# Patient Record
Sex: FEMALE | Race: WHITE | NOT HISPANIC OR LATINO | Employment: STUDENT | URBAN - METROPOLITAN AREA
[De-identification: names, ages, dates, MRNs, and addresses within clinical notes are randomized per-mention and may not be internally consistent; named-entity substitution may affect disease eponyms.]

---

## 2017-01-19 ENCOUNTER — OFFICE VISIT (OUTPATIENT)
Dept: PSYCHIATRY | Facility: CLINIC | Age: 37
End: 2017-01-19
Attending: PSYCHIATRY & NEUROLOGY
Payer: COMMERCIAL

## 2017-01-19 VITALS
WEIGHT: 152.8 LBS | DIASTOLIC BLOOD PRESSURE: 81 MMHG | BODY MASS INDEX: 25.03 KG/M2 | HEART RATE: 64 BPM | SYSTOLIC BLOOD PRESSURE: 114 MMHG

## 2017-01-19 DIAGNOSIS — F31.89 OTHER BIPOLAR DISORDER (H): ICD-10-CM

## 2017-01-19 DIAGNOSIS — F43.10 PTSD (POST-TRAUMATIC STRESS DISORDER): Primary | ICD-10-CM

## 2017-01-19 PROCEDURE — 99212 OFFICE O/P EST SF 10 MIN: CPT | Mod: ZF

## 2017-01-19 RX ORDER — GABAPENTIN 300 MG/1
300 CAPSULE ORAL 4 TIMES DAILY PRN
Qty: 125 CAPSULE | Refills: 3 | Status: SHIPPED | OUTPATIENT
Start: 2017-01-19 | End: 2017-04-27

## 2017-01-19 NOTE — MR AVS SNAPSHOT
After Visit Summary   1/19/2017    Yara Edouard    MRN: [id number]           Patient Information     Date Of Birth          1980        Visit Information        Provider Department      1/19/2017 1:45 PM Satya Mendes MD [facility]        Today's Diagnoses     PTSD (post-traumatic stress disorder)    -  1    Other bipolar disorder (H)          Care Instructions    Try the gabapentin for a week and if anxiety is stabilized and you are still often nauseous, call about cutting back on Lamictal        Follow-ups after your visit        Follow-up notes from your care team     Return in about 2 months (around 3/19/2017).      Your next 10 appointments already scheduled     Mar 23, 2017  2:15 PM CDT   Schizophrenia Follow Up with Satya Mendes MD   Psychiatry Clinic ([facility])    [place] [id number] [facility] [address] [phone]              Who to contact     Please call your clinic at [phone] to:    Ask questions about your health    Make or cancel appointments    Discuss your medicines    Learn about your test results    Speak to your doctor   If you have compliments or concerns about an experience at your clinic, or if you wish to file a complaint, please contact [facility] Patient Relations at [phone] or email us at [email or web address]         Additional Information About Your Visit        MyChart Information     Your[facility] gives you secure access to your electronic health record. If you see a primary care provider, you can also send messages to your care team and make appointments. If you have questions, please call your primary care clinic.  If you do not have a primary care provider, please call [phone] and they will assist you.      [facility] is an electronic gateway that provides easy, online access to your medical records. With [facility], you can request a clinic  appointment, read your test results, renew a prescription or communicate with your care team.     To access your existing account, please contact your [facility] or call [phone] for assistance.        Care EveryWhere ID     This is your Care EveryWhere ID. This could be used by other organizations to access your [facility] medical records  [id number]        Your Vitals Were     Pulse BMI (Body Mass Index)                64 25.04 kg/m2           Blood Pressure from Last 3 Encounters:   01/19/17 114/81   12/12/16 115/79   12/08/16 123/82    Weight from Last 3 Encounters:   01/19/17 69.3 kg (152 lb 12.8 oz)   12/12/16 73.9 kg (163 lb)   12/08/16 73.1 kg (161 lb 3.2 oz)              Today, you had the following     No orders found for display         Today's Medication Changes          These changes are accurate as of: 1/19/17 11:59 PM.  If you have any questions, ask your nurse or doctor.               These medicines have changed or have updated prescriptions.        Dose/Directions    gabapentin 300 MG capsule   Commonly known as:  NEURONTIN   This may have changed:  when to take this   Used for:  PTSD (post-traumatic stress disorder)   Changed by:  Satya Mendes MD        Dose:  300 mg   Take 1 capsule (300 mg) by mouth 4 times daily as needed   Quantity:  125 capsule   Refills:  3            Where to get your medicines      These medications were sent to [facility] [place], [place] - [address]  [address], [facility] [address]     Phone:  [phone]     gabapentin 300 MG capsule                Primary Care Provider Office Phone # Fax #    Neyda Ori Meza [phone]       [facility] [address]  [facility] [address]        Thank you!     Thank you for choosing PSYCHIATRY CLINIC  for your care. Our goal is always to provide you with excellent care. Hearing back from our patients is one way we  can continue to improve our services. Please take a few minutes to complete the written survey that you may receive in the mail after your visit with us. Thank you!             Your Updated Medication List - Protect others around you: Learn how to safely use, store and throw away your medicines at www.disposemymeds.org.          This list is accurate as of: 1/19/17 11:59 PM.  Always use your most recent med list.                   Brand Name Dispense Instructions for use    FLUoxetine 10 MG capsule    PROzac    90 capsule    Take 3 capsules (30 mg) by mouth daily Take 20 mg every other day alternating with 40 mg       gabapentin 300 MG capsule    NEURONTIN    125 capsule    Take 1 capsule (300 mg) by mouth 4 times daily as needed       hydrOXYzine 50 MG capsule    VISTARIL    120 capsule    Take 1 capsule (50 mg) by mouth 2 times daily as needed       ibuprofen 200 MG tablet    ADVIL/MOTRIN     Take 800 mg by mouth every 6 hours as needed       * lamoTRIgine 100 MG tablet    LAMICTAL    30 tablet    Take 0.5 tablets (50 mg) by mouth daily along with 150 mg tab twice daily (350 mg.day). After a week, increase to 1 tab daily (total 400 mg)       * lamoTRIgine 200 MG tablet    LAMICTAL    60 tablet    Take 1 tablet (200 mg) by mouth 2 times daily       ondansetron 4 MG ODT tab    ZOFRAN-ODT    30 tablet    Take 1 tablet (4 mg) by mouth every 4 hours as needed for nausea       phenazopyridine 200 MG tablet    PYRIDIUM    6 tablet    Take 1 tablet (200 mg) by mouth 3 times daily as needed for pain       prazosin 1 MG capsule    MINIPRESS    125 capsule    Take 4 capsules (4 mg) by mouth At Bedtime       propranolol 40 MG tablet    INDERAL    90 tablet    Take 1 tablet (40 mg) by mouth 4 times daily as needed for tremor       QUEtiapine 100 MG tablet    SEROquel    30 tablet    Take 0.5-1 tablets ( mg) by mouth nightly as needed       topiramate 100 MG tablet    TOPAMAX    60 tablet    Take 2 tablets (200 mg) by  mouth daily       * Notice:  This list has 2 medication(s) that are the same as other medications prescribed for you. Read the directions carefully, and ask your doctor or other care provider to review them with you.
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
## 2017-01-19 NOTE — NURSING NOTE
Chief Complaint   Patient presents with     Recheck Medication     Chemical dependency     Reviewed allergies, smoking status, and pharmacy preference  Administered abuse screening questions   Obtained weight, blood pressure and heart rate

## 2017-02-10 ENCOUNTER — TELEPHONE (OUTPATIENT)
Dept: PSYCHIATRY | Facility: CLINIC | Age: 37
End: 2017-02-10

## 2017-02-10 ENCOUNTER — TELEPHONE (OUTPATIENT)
Dept: PHARMACY | Facility: CLINIC | Age: 37
End: 2017-02-10

## 2017-02-10 NOTE — TELEPHONE ENCOUNTER
Prior Authorization Retail Medication Request  Medication/Dose: Prazosin 1mg   Diagnosis and ICD code: F43.10  New/Renewal/Insurance Change PA: Insurance Change   Previously Tried and Failed Therapies: Zoloft (sertraline), Paxil (paroxetine), Celexa (citalopram), Prozac (fluoxetine) -currently taking), Cymbalta (duloxetine), Effexor (venlafaxine), Pamelor (nortriptyline), Elavil (amitriptyline), Remeron (trazodone), Abilify (aripiprazole), Depakote, Lithium, Lamictal (lamotrigine) - currently taking, Neurontin (gabapentin) -currently taking, Seroquel (quetiapine) - currently taking, Topamax (topiramate) - currently taking, hydroxyzine - currently taking, Inderal (propranolol) - currently taking, Ativan (lorazepam), Klonopin (clonazepam), Valium (diazepam), ECT treatments.      Any additional info from fax request:  Pt has tried numerous medications which failed either due to intolerable adverse effects or they were ineffective in treating symptoms.  Patient has been on this medication since November 2014.  Medication was started for the treatment of sleep disturbance (nightmares) related to PTSD.  If pt is not able to obtain adequate restful sleep this can have a negative effect on his mood including a worsening of anxiety, depressive symptoms and suicidal ideation.   Pt is currently stable on his medication regimen and a change would risk decompensation and possible hospitalization.     Insurance ID (if provided): [id number]  Insurance Phone (if provided): [phone]    Thanks!    Gissell Castro [facility]  Pharmacy Technician II  [facility]  [phone]

## 2017-02-10 NOTE — TELEPHONE ENCOUNTER
Satya Mendes MD     Sent: Fri February 10, 2017  4:01 PM       To: Beverly Smith RN; Loretta Rosales [facility]              Message        ----- Message from Satya Mendes MD sent at 2/10/2017  4:01 PM CST -----       Yes, OK to fill gabapentin early

## 2017-02-10 NOTE — TELEPHONE ENCOUNTER
Pt called to request refills and reported that she is leaving to go out of town on Tuesday, 2/14/17.  Per  policy of filling certain controlled substances and gabapentin, authorization from RN or MD is required if filling more than 2 days before patient should be out of medication.    2/14/17 will be day 26 out of a 30 day fill- is this ok to fill on 2/14?  Routed to HUMBERTO Bryant.    Thank you,  Loretta Rosales, PharmD  [facility]  Phone: [phone]

## 2017-02-14 NOTE — TELEPHONE ENCOUNTER
[facility] Prior Authorization Team   Phone: [phone]  Fax: [phone]    PA Initiation    Medication: Prazosin 1mg   Insurance Company: [employer] - Phone [phone] Fax [phone]  Pharmacy Filling the Rx: [place], [place] - [address]  Filling Pharmacy Phone: [phone]  Filling Pharmacy Fax:    Start Date: 2/14/2017

## 2017-02-20 NOTE — TELEPHONE ENCOUNTER
Prior Authorization Approval    Authorization Effective Date: 2/20/2017  Authorization Expiration Date: 2/20/2018  Medication: Prazosin 1mg - APPROVED  Approved Dose/Quantity:   Reference #: [id number]   Insurance Company: [employer] - Phone [phone] Fax [phone]  Expected CoPay: $0.00     CoPay Card Available:      Foundation Assistance Needed:    Which Pharmacy is filling the prescription (Not needed for infusion/clinic administered): [facility] [place], [place] - [address]  Pharmacy Notified: Yes  Patient Notified: Yes

## 2017-02-21 ENCOUNTER — TELEPHONE (OUTPATIENT)
Dept: PSYCHIATRY | Facility: CLINIC | Age: 37
End: 2017-02-21

## 2017-02-21 NOTE — TELEPHONE ENCOUNTER
Yara was on Tramadol last September for a knee problem and that corresponded with a worsening of depression and concern that she might get addicted. When she tapered off it, her depression improved. Please call her and express my concern that she is back on Tramadol, remind her of her past experience and tell her I advise getting off it as soon as possible. The interaction with fluoxetine could cause a problem either my inhibiting the metabolism of Tramadol thus elevating the dose and increasing the risk of addiction, or lowering the Tramadol effect necessitating higher doses to achieve any benefit.

## 2017-02-21 NOTE — TELEPHONE ENCOUNTER
-Rec'd fax from [facility] regarding potential interaction b/w Prozac and Tramadol  -Per [place]  pt rec'd refill of Tramadol 50 mg tab #40 from Ra Parks MD on 2/14/17  -Will alert Dr. Mendes  -Form placed in provider folder for review.

## 2017-02-28 NOTE — TELEPHONE ENCOUNTER
-Tried reaching pt again  -No answer at home number and vm left requesting c/b to review potential med interaction.  -Clinic number provided for c/b    -Provider Response Form faxed to [email or web address] at [phone]  -Original placed in scanning.

## 2017-03-01 NOTE — TELEPHONE ENCOUNTER
Message  Received: Today       Lizbeth More Katherine J, RN       Phone Number: [phone]                     Yara, returning your call.     Thanks!

## 2017-03-30 ENCOUNTER — OFFICE VISIT (OUTPATIENT)
Dept: PSYCHIATRY | Facility: CLINIC | Age: 37
End: 2017-03-30
Attending: PSYCHIATRY & NEUROLOGY
Payer: COMMERCIAL

## 2017-03-30 VITALS
DIASTOLIC BLOOD PRESSURE: 80 MMHG | HEART RATE: 79 BPM | BODY MASS INDEX: 26.09 KG/M2 | WEIGHT: 159.2 LBS | SYSTOLIC BLOOD PRESSURE: 117 MMHG

## 2017-03-30 DIAGNOSIS — F43.10 PTSD (POST-TRAUMATIC STRESS DISORDER): ICD-10-CM

## 2017-03-30 DIAGNOSIS — F31.89 OTHER BIPOLAR DISORDER (H): Primary | ICD-10-CM

## 2017-03-30 DIAGNOSIS — F60.3 BORDERLINE PERSONALITY DISORDER (H): ICD-10-CM

## 2017-03-30 DIAGNOSIS — F31.32 BIPOLAR AFFECTIVE DISORDER, CURRENTLY DEPRESSED, MODERATE (H): ICD-10-CM

## 2017-03-30 PROCEDURE — 99212 OFFICE O/P EST SF 10 MIN: CPT | Mod: ZF

## 2017-03-30 RX ORDER — SUCRALFATE 1 G/1
1 TABLET ORAL 2 TIMES DAILY
COMMUNITY
End: 2019-06-12

## 2017-03-30 RX ORDER — LAMOTRIGINE 200 MG/1
200 TABLET ORAL AT BEDTIME
Qty: 30 TABLET | Refills: 1 | Status: SHIPPED | OUTPATIENT
Start: 2017-03-30 | End: 2017-04-27

## 2017-03-30 RX ORDER — MIRTAZAPINE 15 MG/1
15 TABLET, FILM COATED ORAL AT BEDTIME
Qty: 30 TABLET | Refills: 1 | Status: SHIPPED | OUTPATIENT
Start: 2017-03-30 | End: 2017-04-27

## 2017-03-30 RX ORDER — LAMOTRIGINE 150 MG/1
150 TABLET ORAL DAILY
Qty: 30 TABLET | Refills: 1 | Status: SHIPPED | OUTPATIENT
Start: 2017-03-30 | End: 2017-04-27

## 2017-03-30 RX ORDER — TOPIRAMATE 100 MG/1
150 TABLET, FILM COATED ORAL AT BEDTIME
Qty: 45 TABLET | Refills: 3 | Status: SHIPPED | OUTPATIENT
Start: 2017-03-30 | End: 2017-04-27

## 2017-03-30 RX ORDER — PRAZOSIN HYDROCHLORIDE 1 MG/1
3 CAPSULE ORAL AT BEDTIME
Qty: 125 CAPSULE | Refills: 3 | Status: SHIPPED | OUTPATIENT
Start: 2017-03-30 | End: 2017-04-27
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
## 2017-03-30 NOTE — PATIENT INSTRUCTIONS
Follow dosage instructions (Hydroxyzine up to 2 x 50 mg)  Go back to attending regular meetings  Start Remeron at bedtime  Call the clinic in a week to report on effects of cutting back on Topamax (now at 150 mg at bedtime)

## 2017-03-30 NOTE — MR AVS SNAPSHOT
After Visit Summary   3/30/2017    Yara Edouard    MRN: [id number]           Patient Information     Date Of Birth          1980        Visit Information        Provider Department      3/30/2017 2:15 PM Satya Mendes MD [facility]        Today's Diagnoses     Other bipolar disorder (H)    -  1    PTSD (post-traumatic stress disorder)        Borderline personality disorder        Bipolar affective disorder, currently depressed, moderate (H)          Care Instructions    Follow dosage instructions (Hydroxyzine up to 2 x 50 mg)  Go back to attending regular meetings  Start Remeron at bedtime  Call the clinic in a week to report on effects of cutting back on Topamax (now at 150 mg at bedtime)        Follow-ups after your visit        Follow-up notes from your care team     Return in about 4 weeks (around 4/27/2017).      Your next 10 appointments already scheduled     Apr 27, 2017  3:15 PM CDT   Schizophrenia Follow Up with Satya Mendes MD   Psychiatry Clinic ([facility])    [address] [facility] [address] [phone]              Who to contact     Please call your clinic at [phone] to:    Ask questions about your health    Make or cancel appointments    Discuss your medicines    Learn about your test results    Speak to your doctor   If you have compliments or concerns about an experience at your clinic, or if you wish to file a complaint, please contact [facility] Patient Relations at [phone] or email us at [email or web address]         Additional Information About Your Visit        MyChart Information     [email or web address] gives you secure access to your electronic health record. If you see a primary care provider, you can also send messages to your care team and make appointments. If you have questions, please call your primary care clinic.  If you do not have a primary care  provider, please call [phone] and they will assist you.      [employer] is an electronic gateway that provides easy, online access to your medical records. With [employer], you can request a clinic appointment, read your test results, renew a prescription or communicate with your care team.     To access your existing account, please contact your [facility] or call [phone] for assistance.        Care EveryWhere ID     This is your Care EveryWhere ID. This could be used by other organizations to access your [facility] medical records  [id number]        Your Vitals Were     Pulse BMI (Body Mass Index)                79 26.09 kg/m2           Blood Pressure from Last 3 Encounters:   03/30/17 117/80   01/19/17 114/81   12/12/16 115/79    Weight from Last 3 Encounters:   03/30/17 72.2 kg (159 lb 3.2 oz)   01/19/17 69.3 kg (152 lb 12.8 oz)   12/12/16 73.9 kg (163 lb)              Today, you had the following     No orders found for display         Today's Medication Changes          These changes are accurate as of: 3/30/17  3:05 PM.  If you have any questions, ask your nurse or doctor.               Start taking these medicines.        Dose/Directions    mirtazapine 15 MG tablet   Commonly known as:  REMERON   Used for:  Bipolar affective disorder, currently depressed, moderate (H)   Started by:  Satya Mendes MD        Dose:  15 mg   Take 1 tablet (15 mg) by mouth At Bedtime   Quantity:  30 tablet   Refills:  1         These medicines have changed or have updated prescriptions.        Dose/Directions    * lamoTRIgine 150 MG tablet   Commonly known as:  LAMICTAL   This may have changed:    - medication strength  - how much to take  - additional instructions   Used for:  Other bipolar disorder (H)   Changed by:  Satya Mendes MD        Dose:  150 mg   Take 1 tablet (150 mg) by mouth daily along with 200 mg tab at night   Quantity:  30 tablet   Refills:  1       * lamoTRIgine 200  MG tablet   Commonly known as:  LaMICtal   This may have changed:    - when to take this  - additional instructions   Used for:  PTSD (post-traumatic stress disorder)   Changed by:  Satya Mendes MD        Dose:  200 mg   Take 1 tablet (200 mg) by mouth At Bedtime with 150 mg every morning   Quantity:  30 tablet   Refills:  1       prazosin 1 MG capsule   Commonly known as:  MINIPRESS   This may have changed:  how much to take   Used for:  PTSD (post-traumatic stress disorder)   Changed by:  Satya Mendes MD        Dose:  3 mg   Take 3 capsules (3 mg) by mouth At Bedtime   Quantity:  125 capsule   Refills:  3       topiramate 100 MG tablet   Commonly known as:  TOPAMAX   This may have changed:    - how much to take  - when to take this   Used for:  PTSD (post-traumatic stress disorder), Borderline personality disorder   Changed by:  Satya Mendes MD        Dose:  150 mg   Take 1.5 tablets (150 mg) by mouth At Bedtime   Quantity:  45 tablet   Refills:  3       * Notice:  This list has 2 medication(s) that are the same as other medications prescribed for you. Read the directions carefully, and ask your doctor or other care provider to review them with you.      Stop taking these medicines if you haven't already. Please contact your care team if you have questions.     QUEtiapine 100 MG tablet   Commonly known as:  SEROquel   Stopped by:  Satya Mendes MD                Where to get your medicines      These medications were sent to [facility] [place], [place] - [address]  [address], [facility] [address]     Phone:  [phone]     lamoTRIgine 150 MG tablet    lamoTRIgine 200 MG tablet    mirtazapine 15 MG tablet    prazosin 1 MG capsule    topiramate 100 MG tablet                Primary Care Provider Office Phone # Fax #    Neyda Ori Meza [phone]       [place] [address]  [facility] [address]        Thank you!      Thank you for choosing PSYCHIATRY CLINIC  for your care. Our goal is always to provide you with excellent care. Hearing back from our patients is one way we can continue to improve our services. Please take a few minutes to complete the written survey that you may receive in the mail after your visit with us. Thank you!             Your Updated Medication List - Protect others around you: Learn how to safely use, store and throw away your medicines at www.disposemymeds.org.          This list is accurate as of: 3/30/17  3:05 PM.  Always use your most recent med list.                   Brand Name Dispense Instructions for use    FLUoxetine 10 MG capsule    PROzac    90 capsule    Take 3 capsules (30 mg) by mouth daily Take 20 mg every other day alternating with 40 mg       gabapentin 300 MG capsule    NEURONTIN    125 capsule    Take 1 capsule (300 mg) by mouth 4 times daily as needed       hydrOXYzine 50 MG capsule    VISTARIL    120 capsule    Take 1 capsule (50 mg) by mouth 2 times daily as needed       ibuprofen 200 MG tablet    ADVIL/MOTRIN     Take 800 mg by mouth every 6 hours as needed       * lamoTRIgine 150 MG tablet    LAMICTAL    30 tablet    Take 1 tablet (150 mg) by mouth daily along with 200 mg tab at night       * lamoTRIgine 200 MG tablet    LaMICtal    30 tablet    Take 1 tablet (200 mg) by mouth At Bedtime with 150 mg every morning       mirtazapine 15 MG tablet    REMERON    30 tablet    Take 1 tablet (15 mg) by mouth At Bedtime       ondansetron 4 MG ODT tab    ZOFRAN-ODT    30 tablet    Take 1 tablet (4 mg) by mouth every 4 hours as needed for nausea       phenazopyridine 200 MG tablet    PYRIDIUM    6 tablet    Take 1 tablet (200 mg) by mouth 3 times daily as needed for pain       prazosin 1 MG capsule    MINIPRESS    125 capsule    Take 3 capsules (3 mg) by mouth At Bedtime       propranolol 40 MG tablet    INDERAL    90 tablet    Take 1 tablet (40 mg) by mouth 4 times daily as needed for  tremor       sucralfate 1 GM tablet    CARAFATE     Take 1 g by mouth 2 times daily       topiramate 100 MG tablet    TOPAMAX    45 tablet    Take 1.5 tablets (150 mg) by mouth At Bedtime       * Notice:  This list has 2 medication(s) that are the same as other medications prescribed for you. Read the directions carefully, and ask your doctor or other care provider to review them with you.

## 2017-04-10 ENCOUNTER — CARE COORDINATION (OUTPATIENT)
Dept: PSYCHIATRY | Facility: CLINIC | Age: 37
End: 2017-04-10

## 2017-04-10 NOTE — PROGRESS NOTES
Message  Received: Today       Nely Lambert Katherine J, RN       Phone Number: [phone]                     Vaughn/Annette     Patient is caller. She says that she was told to call and give an update. She says that she is ok to go down to 100mg on the Topamax.

## 2017-04-10 NOTE — PROGRESS NOTES
"Appt history:  Last seen:  3/30/17  RTC:  8 weeks  Cancel:  none  No-show:  none  Next appt:  4/27/17    At last appt:  1.  Cut back on topiramate to 150 mg daily, possibly more if tolerates this without dissociative phenomena  2.  Start mirtazapine 30 mg QHS  3.  Continue current medication regimen, don't overuse the hydroxyzine    Returned call to pt:  -spoke briefly as she was getting ready to leave for class  -decreased Topamax from 200 to 150 mg on 3/31/17  -cognition improved with this decrease  -was able to more easily complete a paper for school  -has also noticed that she is more \"obsessive\"  -has been picking at her face which is something she used to do when younger  -occurs once a day but can last up to 2 hours  -does have sores on face from picking  -also reports she is not sleeping well  -can be awake until 2256-3801 with an \"obsessive feeling\"  -unable to describe this feeling further  -denies racing thoughts or having too much energy at night  -no longer taking Seroquel  -has not been using hydroxyzine  -pt does not want to increase Topamax back to previous dose  -pt will continue at current dose for now  -will forward to Dr. Mendes for further recommendations.  -pt confirms detailed msg can be left at below number.    "

## 2017-04-21 ENCOUNTER — TELEPHONE (OUTPATIENT)
Dept: PSYCHIATRY | Facility: CLINIC | Age: 37
End: 2017-04-21

## 2017-04-21 DIAGNOSIS — F43.10 PTSD (POST-TRAUMATIC STRESS DISORDER): ICD-10-CM

## 2017-04-21 RX ORDER — FLUOXETINE 10 MG/1
30 CAPSULE ORAL DAILY
Qty: 90 CAPSULE | Refills: 0 | Status: SHIPPED | OUTPATIENT
Start: 2017-04-21 | End: 2017-04-27

## 2017-04-21 NOTE — TELEPHONE ENCOUNTER
Last seen: 3/30/17  RTC: 8 weeks  Cancel: none  No-show: none  Next appt: 4/27/17    Medication requested: Prozac 10 mg capsules  Directions: Take 3 capsules daily Take 20 mg QOD alternating with 40 mg  Qty: 90    Will clarify directions w/ Dr. Mendes prior to refilling.

## 2017-04-21 NOTE — TELEPHONE ENCOUNTER
Drug:Fluoxetine 10mg   Last Fill Date: 03/09/2017  Last Fill Quantity: 90  Last Office Visit: 03/30/2017    Thanks!  Gissell Castro [facility]  Pharmacy Technician II  [facility]  [phone]

## 2017-04-21 NOTE — TELEPHONE ENCOUNTER
Per Dr. Mendes  the dose is 30 mg/day. I think the order alternating 20 and 40 mg was a holdover from when she only had 20 mg tabs and had a lot to use up. Either way is OK with me. It may be cheaper to take fewer pills and the long half-life makes the QOD variation insignificant.    Refilled to pharmacy for 30 mg daily

## 2017-04-27 ENCOUNTER — OFFICE VISIT (OUTPATIENT)
Dept: PSYCHIATRY | Facility: CLINIC | Age: 37
End: 2017-04-27
Attending: PSYCHIATRY & NEUROLOGY
Payer: COMMERCIAL

## 2017-04-27 VITALS
BODY MASS INDEX: 26.22 KG/M2 | DIASTOLIC BLOOD PRESSURE: 83 MMHG | WEIGHT: 160 LBS | HEART RATE: 61 BPM | SYSTOLIC BLOOD PRESSURE: 131 MMHG

## 2017-04-27 DIAGNOSIS — F60.3 BORDERLINE PERSONALITY DISORDER (H): ICD-10-CM

## 2017-04-27 DIAGNOSIS — F31.89 OTHER BIPOLAR DISORDER (H): ICD-10-CM

## 2017-04-27 DIAGNOSIS — F43.10 PTSD (POST-TRAUMATIC STRESS DISORDER): ICD-10-CM

## 2017-04-27 DIAGNOSIS — F31.32 BIPOLAR AFFECTIVE DISORDER, CURRENTLY DEPRESSED, MODERATE (H): ICD-10-CM

## 2017-04-27 PROCEDURE — 99212 OFFICE O/P EST SF 10 MIN: CPT | Mod: ZF

## 2017-04-27 RX ORDER — LAMOTRIGINE 200 MG/1
200 TABLET ORAL AT BEDTIME
Qty: 30 TABLET | Refills: 1 | Status: SHIPPED | OUTPATIENT
Start: 2017-04-27 | End: 2017-09-28

## 2017-04-27 RX ORDER — PRAZOSIN HYDROCHLORIDE 1 MG/1
3 CAPSULE ORAL AT BEDTIME
Qty: 125 CAPSULE | Refills: 3 | Status: SHIPPED | OUTPATIENT
Start: 2017-04-27 | End: 2017-09-28

## 2017-04-27 RX ORDER — MIRTAZAPINE 15 MG/1
15 TABLET, FILM COATED ORAL AT BEDTIME
Qty: 30 TABLET | Refills: 1 | Status: SHIPPED | OUTPATIENT
Start: 2017-04-27 | End: 2017-09-28

## 2017-04-27 RX ORDER — LAMOTRIGINE 150 MG/1
150 TABLET ORAL DAILY
Qty: 30 TABLET | Refills: 1 | Status: SHIPPED | OUTPATIENT
Start: 2017-04-27 | End: 2017-07-26

## 2017-04-27 RX ORDER — GABAPENTIN 300 MG/1
300 CAPSULE ORAL 4 TIMES DAILY PRN
Qty: 125 CAPSULE | Refills: 3 | Status: SHIPPED | OUTPATIENT
Start: 2017-04-27 | End: 2017-09-28

## 2017-04-27 RX ORDER — TOPIRAMATE 100 MG/1
150 TABLET, FILM COATED ORAL AT BEDTIME
Qty: 45 TABLET | Refills: 3 | Status: SHIPPED | OUTPATIENT
Start: 2017-04-27 | End: 2017-09-28

## 2017-04-27 RX ORDER — PROPRANOLOL HYDROCHLORIDE 40 MG/1
40 TABLET ORAL 4 TIMES DAILY PRN
Qty: 90 TABLET | Refills: 3 | Status: SHIPPED | OUTPATIENT
Start: 2017-04-27 | End: 2017-09-28

## 2017-04-27 RX ORDER — HYDROXYZINE PAMOATE 50 MG/1
50 CAPSULE ORAL 2 TIMES DAILY PRN
Qty: 120 CAPSULE | Refills: 2 | Status: SHIPPED | OUTPATIENT
Start: 2017-04-27 | End: 2017-09-28

## 2017-04-27 NOTE — NURSING NOTE
Chief Complaint   Patient presents with     Recheck Medication     bipolar disorder     Reviewed allergies, smoking status, and pharmacy preference  Administered abuse screening questions   Obtained weight, blood pressure and heart rate

## 2017-04-27 NOTE — PATIENT INSTRUCTIONS
increase Prozac to 40 mg x 1 week then 60 mhg  continue all other meds  talk to instructors about some of your diffiiculty with the material which triggers your own emotional response
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
## 2017-04-27 NOTE — MR AVS SNAPSHOT
After Visit Summary   4/27/2017    Yara Edouard    MRN: [id number]           Patient Information     Date Of Birth          1980        Visit Information        Provider Department      4/27/2017 3:15 PM Satya Mendes MD Psychiatry Clinic        Today's Diagnoses     PTSD (post-traumatic stress disorder)        Borderline personality disorder        Bipolar affective disorder, currently depressed, moderate (H)        Other bipolar disorder (H)          Care Instructions    increase Prozac to 40 mg x 1 week then 60 mhg  continue all other meds  talk to instructors about some of your diffiiculty with the material which triggers your own emotional response        Follow-ups after your visit        Follow-up notes from your care team     Return in about 6 weeks (around 6/8/2017).      Your next 10 appointments already scheduled     Jun 08, 2017  2:45 PM CDT   Schizophrenia Follow Up with Satya Mendes MD   Psychiatry Clinic ([facility])    [address]  [address] [facility] [address] [phone]              Who to contact     Please call your clinic at [phone] to:    Ask questions about your health    Make or cancel appointments    Discuss your medicines    Learn about your test results    Speak to your doctor   If you have compliments or concerns about an experience at your clinic, or if you wish to file a complaint, please contact [facility] Patient Relations at [phone] or email us at [email or web address]         Additional Information About Your Visit        MyChart Information     [employer] gives you secure access to your electronic health record. If you see a primary care provider, you can also send messages to your care team and make appointments. If you have questions, please call your primary care clinic.  If you do not have a primary care provider, please call [phone] and they will  assist you.      [employer] is an electronic gateway that provides easy, online access to your medical records. With [employer], you can request a clinic appointment, read your test results, renew a prescription or communicate with your care team.     To access your existing account, please contact your [facility] or call [phone] for assistance.        Care EveryWhere ID     This is your Care EveryWhere ID. This could be used by other organizations to access your [facility] medical records  [id number]        Your Vitals Were     Pulse BMI (Body Mass Index)                61 26.22 kg/m2           Blood Pressure from Last 3 Encounters:   04/27/17 131/83   03/30/17 117/80   01/19/17 114/81    Weight from Last 3 Encounters:   04/27/17 72.6 kg (160 lb)   03/30/17 72.2 kg (159 lb 3.2 oz)   01/19/17 69.3 kg (152 lb 12.8 oz)              Today, you had the following     No orders found for display         Today's Medication Changes          These changes are accurate as of: 4/27/17  3:56 PM.  If you have any questions, ask your nurse or doctor.               These medicines have changed or have updated prescriptions.        Dose/Directions    FLUoxetine 20 MG capsule   Commonly known as:  PROzac   This may have changed:    - medication strength  - how much to take   Used for:  PTSD (post-traumatic stress disorder)   Changed by:  Satya Mendes MD        Dose:  60 mg   Take 3 capsules (60 mg) by mouth daily   Quantity:  90 capsule   Refills:  3            Where to get your medicines      These medications were sent to [facility] [address]  [address] [address]     Phone:  [phone]     FLUoxetine 20 MG capsule    gabapentin 300 MG capsule    hydrOXYzine 50 MG capsule    lamoTRIgine 150 MG tablet    lamoTRIgine 200 MG tablet    mirtazapine 15 MG tablet    prazosin 1 MG capsule    propranolol 40 MG tablet    topiramate 100 MG  tablet                Primary Care Provider Office Phone # Fax #    Neyda Ori Meza [phone]       [address]  [facility] [address]        Thank you!     Thank you for choosing PSYCHIATRY CLINIC  for your care. Our goal is always to provide you with excellent care. Hearing back from our patients is one way we can continue to improve our services. Please take a few minutes to complete the written survey that you may receive in the mail after your visit with us. Thank you!             Your Updated Medication List - Protect others around you: Learn how to safely use, store and throw away your medicines at www.disposemymeds.org.          This list is accurate as of: 4/27/17  3:56 PM.  Always use your most recent med list.                   Brand Name Dispense Instructions for use    FLUoxetine 20 MG capsule    PROzac    90 capsule    Take 3 capsules (60 mg) by mouth daily       gabapentin 300 MG capsule    NEURONTIN    125 capsule    Take 1 capsule (300 mg) by mouth 4 times daily as needed       hydrOXYzine 50 MG capsule    VISTARIL    120 capsule    Take 1 capsule (50 mg) by mouth 2 times daily as needed       ibuprofen 200 MG tablet    ADVIL/MOTRIN     Take 800 mg by mouth every 6 hours as needed       * lamoTRIgine 150 MG tablet    LaMICtal    30 tablet    Take 1 tablet (150 mg) by mouth daily along with 200 mg tab at night       * lamoTRIgine 200 MG tablet    LaMICtal    30 tablet    Take 1 tablet (200 mg) by mouth At Bedtime with 150 mg every morning       mirtazapine 15 MG tablet    REMERON    30 tablet    Take 1 tablet (15 mg) by mouth At Bedtime       ondansetron 4 MG ODT tab    ZOFRAN-ODT    30 tablet    Take 1 tablet (4 mg) by mouth every 4 hours as needed for nausea       phenazopyridine 200 MG tablet    PYRIDIUM    6 tablet    Take 1 tablet (200 mg) by mouth 3 times daily as needed for pain       prazosin 1 MG capsule    MINIPRESS    125 capsule     Take 3 capsules (3 mg) by mouth At Bedtime       propranolol 40 MG tablet    INDERAL    90 tablet    Take 1 tablet (40 mg) by mouth 4 times daily as needed for tremor       sucralfate 1 GM tablet    CARAFATE     Take 1 g by mouth 2 times daily       topiramate 100 MG tablet    TOPAMAX    45 tablet    Take 1.5 tablets (150 mg) by mouth At Bedtime       * Notice:  This list has 2 medication(s) that are the same as other medications prescribed for you. Read the directions carefully, and ask your doctor or other care provider to review them with you.

## 2017-07-24 ENCOUNTER — TELEPHONE (OUTPATIENT)
Dept: PSYCHIATRY | Facility: CLINIC | Age: 37
End: 2017-07-24

## 2017-07-24 DIAGNOSIS — F31.89 OTHER BIPOLAR DISORDER (H): ICD-10-CM

## 2017-07-24 NOTE — TELEPHONE ENCOUNTER
Last seen: 4/27/17  RTC: 6 weeks  Cancel: 6/8/17 (provider out)  No-show: none  Next appt: not scheduled    Per refill protocol msg sent to clinic staff to contact pt to schedule next available appt with Dr. Mednes  Once outcome if this call is known will address RF.

## 2017-07-24 NOTE — TELEPHONE ENCOUNTER
Drug:Lamotrigine 150mg  Last Fill Date: 06/26/2017  Last Fill Quantity: 30  Last Office Visit: 04/27/2017    Patient wishes to  medications tomorrow afternoon.     Thanks!  Gissell Castro [facility]  Pharmacy Technician II  [facility]  [phone]

## 2017-07-26 RX ORDER — LAMOTRIGINE 150 MG/1
150 TABLET ORAL DAILY
Qty: 30 TABLET | Refills: 0 | Status: SHIPPED | OUTPATIENT
Start: 2017-07-26 | End: 2017-09-28

## 2017-07-26 NOTE — TELEPHONE ENCOUNTER
RE: needs f/u appt  Received: Today       Sheri Morris, Beverly STRAUSS RN                   Tried calling pt, voicemail was full. I sent her a [employer] message requesting that she call to schedule a follow up.

## 2017-07-26 NOTE — TELEPHONE ENCOUNTER
Per refill protocol, a refill is approved to pharmacy with msg that pt needs to be seen.  Will notify Dr. Mendes.

## 2017-07-29 ENCOUNTER — HEALTH MAINTENANCE LETTER (OUTPATIENT)
Age: 37
End: 2017-07-29

## 2017-09-14 ENCOUNTER — TELEPHONE (OUTPATIENT)
Dept: PSYCHIATRY | Facility: CLINIC | Age: 37
End: 2017-09-14

## 2017-09-14 NOTE — TELEPHONE ENCOUNTER
----- Message from Nely Lambert sent at 9/14/2017 12:52 PM CDT -----  Vaughn/Annette Sethi is caller. He says that pt is discharging from [facility] in [place] tomorrow. He says that she is off the gabapentin and the Topamax. He says that her depression getting better with trintilex 20mg a day. He says that there will be a discharge summary sent to us.    [phone]

## 2017-09-28 ENCOUNTER — OFFICE VISIT (OUTPATIENT)
Dept: PSYCHIATRY | Facility: CLINIC | Age: 37
End: 2017-09-28
Attending: PSYCHIATRY & NEUROLOGY
Payer: COMMERCIAL

## 2017-09-28 VITALS
SYSTOLIC BLOOD PRESSURE: 137 MMHG | WEIGHT: 171.4 LBS | BODY MASS INDEX: 28.09 KG/M2 | DIASTOLIC BLOOD PRESSURE: 83 MMHG | HEART RATE: 80 BPM

## 2017-09-28 DIAGNOSIS — F31.32 BIPOLAR AFFECTIVE DISORDER, CURRENTLY DEPRESSED, MODERATE (H): ICD-10-CM

## 2017-09-28 DIAGNOSIS — F43.10 PTSD (POST-TRAUMATIC STRESS DISORDER): ICD-10-CM

## 2017-09-28 DIAGNOSIS — F31.89 OTHER BIPOLAR DISORDER (H): ICD-10-CM

## 2017-09-28 PROCEDURE — 99212 OFFICE O/P EST SF 10 MIN: CPT | Mod: ZF

## 2017-09-28 RX ORDER — PROPRANOLOL HYDROCHLORIDE 40 MG/1
40 TABLET ORAL 4 TIMES DAILY PRN
Qty: 100 TABLET | Refills: 3 | Status: SHIPPED | OUTPATIENT
Start: 2017-09-28 | End: 2018-01-18

## 2017-09-28 RX ORDER — CLONIDINE HYDROCHLORIDE 0.1 MG/1
0.1 TABLET ORAL AT BEDTIME
Qty: 50 TABLET | Refills: 1 | Status: SHIPPED | OUTPATIENT
Start: 2017-09-28 | End: 2017-10-06

## 2017-09-28 RX ORDER — QUETIAPINE FUMARATE 100 MG/1
100-200 TABLET, FILM COATED ORAL
Qty: 60 TABLET | COMMUNITY
Start: 2017-09-28 | End: 2017-11-28

## 2017-09-28 RX ORDER — LAMOTRIGINE 200 MG/1
200 TABLET ORAL AT BEDTIME
Qty: 30 TABLET | Refills: 1 | Status: SHIPPED | OUTPATIENT
Start: 2017-09-28 | End: 2018-01-18

## 2017-09-28 RX ORDER — MIRTAZAPINE 15 MG/1
15 TABLET, FILM COATED ORAL AT BEDTIME
Qty: 30 TABLET | Refills: 1 | Status: SHIPPED | OUTPATIENT
Start: 2017-09-28 | End: 2017-12-28 | Stop reason: SINTOL

## 2017-09-28 RX ORDER — LAMOTRIGINE 150 MG/1
150 TABLET ORAL DAILY
Qty: 30 TABLET | Refills: 3 | Status: SHIPPED | OUTPATIENT
Start: 2017-09-28 | End: 2018-01-18

## 2017-09-28 NOTE — NURSING NOTE
Chief Complaint   Patient presents with     Recheck Medication     PTSD    Reviewed allergies, smoking status, and pharmacy preference  Administered abuse screening questions   Obtained weight, blood pressure and heart rate

## 2017-09-28 NOTE — PATIENT INSTRUCTIONS
For anxiety, try 1/2- 1 clonidine tab or another propranolol during the day  Minimize caffeine  Continue with meetings and therapy, exercise

## 2017-09-28 NOTE — MR AVS SNAPSHOT
After Visit Summary   9/28/2017    Yara Edouard    MRN: [id number]           Patient Information     Date Of Birth          1980        Visit Information        Provider Department      9/28/2017 3:15 PM Satya Mendes MD Psychiatry Clinic        Today's Diagnoses     PTSD (post-traumatic stress disorder)        Other bipolar disorder (H)        Bipolar affective disorder, currently depressed, moderate (H)          Care Instructions    For anxiety, try 1/2- 1 clonidine tab or another propranolol during the day  Minimize caffeine  Continue with meetings and therapy, exercise          Follow-ups after your visit        Follow-up notes from your care team     Return in about 6 years (around 9/28/2023).      Your next 10 appointments already scheduled     Nov 09, 2017  2:15 PM CST   Schizophrenia Follow Up with Satya Mendes MD   Psychiatry Clinic ([facility])    [place] [id number]  [address] [facility] [address] [phone]              Who to contact     Please call your clinic at [phone] to:    Ask questions about your health    Make or cancel appointments    Discuss your medicines    Learn about your test results    Speak to your doctor   If you have compliments or concerns about an experience at your clinic, or if you wish to file a complaint, please contact [facility] Patient Relations at [phone] or email us at [email or web address]         Additional Information About Your Visit        MyChart Information     [employer] gives you secure access to your electronic health record. If you see a primary care provider, you can also send messages to your care team and make appointments. If you have questions, please call your primary care clinic.  If you do not have a primary care provider, please call [phone] and they will assist you.      [employer] is an electronic gateway that provides easy, online  access to your medical records. With [employer], you can request a clinic appointment, read your test results, renew a prescription or communicate with your care team.     To access your existing account, please contact your [facility] or call [phone] for assistance.        Care EveryWhere ID     This is your Care EveryWhere ID. This could be used by other organizations to access your [facility] medical records  [id number]        Your Vitals Were     Pulse BMI (Body Mass Index)                80 28.09 kg/m2           Blood Pressure from Last 3 Encounters:   09/28/17 137/83   04/27/17 131/83   03/30/17 117/80    Weight from Last 3 Encounters:   09/28/17 77.7 kg (171 lb 6.4 oz)   04/27/17 72.6 kg (160 lb)   03/30/17 72.2 kg (159 lb 3.2 oz)              Today, you had the following     No orders found for display         Today's Medication Changes          These changes are accurate as of: 9/28/17  4:07 PM.  If you have any questions, ask your nurse or doctor.               Start taking these medicines.        Dose/Directions    cloNIDine 0.1 MG tablet   Commonly known as:  CATAPRES   Used for:  PTSD (post-traumatic stress disorder)   Started by:  Satya Mendes MD        Dose:  0.1 mg   Take 1 tablet (0.1 mg) by mouth At Bedtime , may repeat once if needed   Quantity:  50 tablet   Refills:  1       vortioxetine 20 MG tablet   Commonly known as:  TRINTELLIX   Used for:  PTSD (post-traumatic stress disorder)   Started by:  Satya Mendes MD        Dose:  20 mg   Take 1 tablet (20 mg) by mouth daily   Quantity:  30 tablet   Refills:  3         Stop taking these medicines if you haven't already. Please contact your care team if you have questions.     gabapentin 300 MG capsule   Commonly known as:  NEURONTIN   Stopped by:  Satya Mendes MD           hydrOXYzine 50 MG capsule   Commonly known as:  VISTARIL   Stopped by:  Satya Mendes MD           topiramate 100 MG tablet    Commonly known as:  TOPAMAX   Stopped by:  Satya Mendes MD                Where to get your medicines      These medications were sent to [facility] [place], [place] - [address]  [address], [facility] [address]     Phone:  [phone]     cloNIDine 0.1 MG tablet    lamoTRIgine 150 MG tablet    lamoTRIgine 200 MG tablet    mirtazapine 15 MG tablet    propranolol 40 MG tablet    vortioxetine 20 MG tablet                Primary Care Provider Office Phone # Fax #    Neyda Ori Meza [phone]       [facility] [address]  [facility] [address]        Equal Access to Services     MARK GRULLON : Hadii bibi ku hadasho Soomaali, waaxda luqadaha, qaybta kaalmada adeegyada, waxay idiin haypatrian africa hurd . So [facility] [phone].    ATENCIÓN: Si habla español, tiene a galeana disposición servicios gratuitos de asistencia lingüística. LlCinc[facility] [phone].    We comply with applicable federal civil rights laws and [place] laws. We do not discriminate on the basis of race, color, national origin, age, disability sex, sexual orientation or gender identity.            Thank you!     Thank you for choosing PSYCHIATRY CLINIC  for your care. Our goal is always to provide you with excellent care. Hearing back from our patients is one way we can continue to improve our services. Please take a few minutes to complete the written survey that you may receive in the mail after your visit with us. Thank you!             Your Updated Medication List - Protect others around you: Learn how to safely use, store and throw away your medicines at www.disposemymeds.org.          This list is accurate as of: 9/28/17  4:07 PM.  Always use your most recent med list.                   Brand Name Dispense Instructions for use Diagnosis    cloNIDine 0.1 MG tablet    CATAPRES    50 tablet    Take 1 tablet (0.1 mg) by mouth At Bedtime , may repeat once if  needed    PTSD (post-traumatic stress disorder)       ibuprofen 200 MG tablet    ADVIL/MOTRIN     Take 800 mg by mouth every 6 hours as needed        * lamoTRIgine 150 MG tablet    LaMICtal    30 tablet    Take 1 tablet (150 mg) by mouth daily along with 200 mg tab at night    Other bipolar disorder (H)       * lamoTRIgine 200 MG tablet    LaMICtal    30 tablet    Take 1 tablet (200 mg) by mouth At Bedtime with 150 mg every morning    PTSD (post-traumatic stress disorder)       mirtazapine 15 MG tablet    REMERON    30 tablet    Take 1 tablet (15 mg) by mouth At Bedtime    Bipolar affective disorder, currently depressed, moderate (H)       ondansetron 4 MG ODT tab    ZOFRAN-ODT    30 tablet    Take 1 tablet (4 mg) by mouth every 4 hours as needed for nausea    Nausea       phenazopyridine 200 MG tablet    PYRIDIUM    6 tablet    Take 1 tablet (200 mg) by mouth 3 times daily as needed for pain        propranolol 40 MG tablet    INDERAL    100 tablet    Take 1 tablet (40 mg) by mouth 4 times daily as needed for tremor    PTSD (post-traumatic stress disorder)       QUEtiapine 100 MG tablet    SEROquel    60 tablet    Take 1-2 tablets (100-200 mg) by mouth nightly as needed    PTSD (post-traumatic stress disorder)       sucralfate 1 GM tablet    CARAFATE     Take 1 g by mouth 2 times daily        vortioxetine 20 MG tablet    TRINTELLIX    30 tablet    Take 1 tablet (20 mg) by mouth daily    PTSD (post-traumatic stress disorder)       * Notice:  This list has 2 medication(s) that are the same as other medications prescribed for you. Read the directions carefully, and ask your doctor or other care provider to review them with you.
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
## 2017-10-06 ENCOUNTER — CARE COORDINATION (OUTPATIENT)
Dept: PSYCHIATRY | Facility: CLINIC | Age: 37
End: 2017-10-06

## 2017-10-06 DIAGNOSIS — F43.10 PTSD (POST-TRAUMATIC STRESS DISORDER): ICD-10-CM

## 2017-10-06 RX ORDER — CLONIDINE HYDROCHLORIDE 0.1 MG/1
0.1 TABLET ORAL 3 TIMES DAILY PRN
Qty: 90 TABLET | Refills: 1 | Status: SHIPPED | OUTPATIENT
Start: 2017-10-06 | End: 2017-11-21

## 2017-10-06 NOTE — PROGRESS NOTES
Meds  Received: Yesterday       Jhoana Mccauley Katherine J RN       Phone Number: [phone]                     Pt was told to start taking clonidine during the day as well but was not given an extra prescription to make up for upping her script. She will be going out of town this weekend and will run out and needs more. Said she called earlier today but has not heard anything back.     Ok to leave detailed VM: Yes

## 2017-10-06 NOTE — PROGRESS NOTES
"Appt history:  Last seen:  9/28/17  Cancel:  none  No-show:  none  Next appt:  11/9/17    At last appt (per AVS):  For anxiety, try 1/2- 1 clonidine tab or another propranolol during the day    Returned call to pt:  Did  prescription Dr. Mendes had sent in at last appt for Clonidine 0.1 mg tabs #50.  Pt shares that she is taking Clonidine 0.1 mg BID-TID + 0.1 mg QHS.  Has found that this higher dose \"definately\" helps with her anxiety.  Usually takes propranolol 40 mg qam and has tried increasing dose but \"doesn't touch\" her anxiety.  Discuss need to get approval from Dr. Mendes to refill at higher dose as recommendations were to take 1/2 to 1 tab during the day.  Pt is leaving for a two week trip to [place] tomorrow and will run out of medication w/o new rx.  [facility]erred pharmacy [facility].  Forwarded to Dr. Mendes.        "

## 2017-11-21 DIAGNOSIS — F43.10 PTSD (POST-TRAUMATIC STRESS DISORDER): ICD-10-CM

## 2017-11-21 NOTE — TELEPHONE ENCOUNTER
Medication requested: Clonidine 0.1 mg tabs  Last refilled: 10-29-17  Qty: 90      Last seen: 9-28-17  RTC: not indicated  Cancel: 1  No-show: 0  Next appt: 12-14-17    Refill decision: Refill pended and routed to the provider for review/determination due to cancellation x1 and no clinic note to review.      Kathleen M Doege RN

## 2017-11-22 ENCOUNTER — CARE COORDINATION (OUTPATIENT)
Dept: PSYCHIATRY | Facility: CLINIC | Age: 37
End: 2017-11-22

## 2017-11-22 DIAGNOSIS — F43.10 PTSD (POST-TRAUMATIC STRESS DISORDER): Primary | ICD-10-CM

## 2017-11-22 RX ORDER — CLONIDINE HYDROCHLORIDE 0.1 MG/1
0.1 TABLET ORAL 3 TIMES DAILY PRN
Qty: 90 TABLET | Refills: 1 | Status: ON HOLD | OUTPATIENT
Start: 2017-11-22 | End: 2018-01-08

## 2017-11-22 NOTE — PROGRESS NOTES
Med side effects and questions   Received: Today       Jhoana Mccauley Katherine J RN       Phone Number: [phone]                     Pt is on trtillex and pt is starting to have extreme nausea and vomiting she has started taking it in half and that has helped she wanted to know if she could switch to 10mg instead of the 20 she has been on.     Ok to leave VM: Yes

## 2017-11-22 NOTE — PROGRESS NOTES
"Returned call to pt.  She began taking Trintellix 10 mg daily in Sept.  Around mid to end of September increased dose to 20 mg daily.  Shortly after increase began experiencing \"debilitating\" nausea and vomiting which was occurring 24/7.  Reduced Trintellix dose back to 10 mg daily 1.5 weeks ago and within a day had a significant reduction in nausea and has not vomited since.  Does share that she had bronchitis for 5 weeks beginning 10/7/17.  Was on abx until 10/27/17.  Denies any other medication changes since starting Trintellix.  Denies any tremor, confusion, fever or agitation.  Feels like medication was helping mood.  Anxiety is high stating that clonidine helps but is not \"super effective\".  Next scheduled appt with Dr. Mendes is 12/14/17.  Pt offers that she would be willing to switch to Viibryd if provider recommends.  States that this medication was given as an option when she was at a treatment facility in [place].  Will forward to provider for recommendations for continuing Trintellix 10 mg daily vs switching to alternative (pt mentions Viibryd).  If recommendation is to continue Trintellix will send in new rx for 10 mg tabs to [facility] as pt has difficulty splitting the 20 mg tabs.  "

## 2017-11-28 ENCOUNTER — TELEPHONE (OUTPATIENT)
Dept: PSYCHIATRY | Facility: CLINIC | Age: 37
End: 2017-11-28

## 2017-11-28 DIAGNOSIS — F43.10 PTSD (POST-TRAUMATIC STRESS DISORDER): ICD-10-CM

## 2017-11-28 RX ORDER — QUETIAPINE FUMARATE 100 MG/1
100-200 TABLET, FILM COATED ORAL
Qty: 30 TABLET | Refills: 0 | Status: SHIPPED | OUTPATIENT
Start: 2017-11-28 | End: 2017-12-28

## 2017-11-28 NOTE — TELEPHONE ENCOUNTER
Received RF request from [facility] for:    QUEtiapine (SEROQUEL) 100 MG tablet 60 tablet  9/28/2017  --   Sig: Take 1-2 tablets (100-200 mg) by mouth nightly as needed       Last RF:  9/28/17    Due for RF:  yes    Appointment History:  Last appt: 9/28/17  RTC: 6 yweeks  Cancel: one - 11/09/17  No-show: none  Next appt: 12/14/17      Will route to Dr. Mendes for authorization to refill since last visit note is open/unsigned.

## 2017-11-28 NOTE — TELEPHONE ENCOUNTER
Per iram Alvarez to refill until next appointment.    Sent refill for 30 tablets (15 d/s) to the pharmacy.

## 2017-11-29 DIAGNOSIS — F43.10 PTSD (POST-TRAUMATIC STRESS DISORDER): ICD-10-CM

## 2017-11-29 NOTE — PROGRESS NOTES
Satya Mendes MD Blake, Katherine J, RN    2 days ago                   OK to continue at lower dose tht doesn't cause nausea. I wouldn't change her AD until her visit--not sure why her CD program recommended Viibryd or why she didn't bring that up at her visit when we picked vortioxetine, but we can talk about it when she comes in.

## 2017-11-29 NOTE — PROGRESS NOTES
LVM for pt summarizing Dr. Mendes's message below.  Informed her 10 mg tab will be sent to pharmacy.

## 2017-11-29 NOTE — PROGRESS NOTES
New Rx is sent but it locally printed.  Opened new RF encounter 11/29/17 and it successfully e-prescribed.

## 2017-12-28 ENCOUNTER — OFFICE VISIT (OUTPATIENT)
Dept: PSYCHIATRY | Facility: CLINIC | Age: 37
End: 2017-12-28
Attending: PSYCHIATRY & NEUROLOGY
Payer: COMMERCIAL

## 2017-12-28 VITALS
WEIGHT: 174.8 LBS | HEART RATE: 69 BPM | SYSTOLIC BLOOD PRESSURE: 150 MMHG | BODY MASS INDEX: 28.65 KG/M2 | DIASTOLIC BLOOD PRESSURE: 90 MMHG

## 2017-12-28 DIAGNOSIS — F11.10: ICD-10-CM

## 2017-12-28 DIAGNOSIS — F31.32 BIPOLAR AFFECTIVE DISORDER, CURRENTLY DEPRESSED, MODERATE (H): ICD-10-CM

## 2017-12-28 DIAGNOSIS — F31.89 OTHER BIPOLAR DISORDER (H): Primary | ICD-10-CM

## 2017-12-28 DIAGNOSIS — F19.20 CHEMICAL DEPENDENCY (H): ICD-10-CM

## 2017-12-28 DIAGNOSIS — F43.10 PTSD (POST-TRAUMATIC STRESS DISORDER): ICD-10-CM

## 2017-12-28 PROCEDURE — 99212 OFFICE O/P EST SF 10 MIN: CPT | Mod: ZF

## 2017-12-28 RX ORDER — QUETIAPINE FUMARATE 100 MG/1
100-200 TABLET, FILM COATED ORAL
Qty: 60 TABLET | Refills: 1 | Status: SHIPPED | OUTPATIENT
Start: 2017-12-28 | End: 2018-01-18

## 2017-12-28 RX ORDER — NALTREXONE HYDROCHLORIDE 50 MG/1
TABLET, FILM COATED ORAL
Qty: 30 TABLET | Refills: 1 | Status: ON HOLD | OUTPATIENT
Start: 2017-12-28 | End: 2018-01-08

## 2017-12-28 RX ORDER — MIRTAZAPINE 15 MG/1
15 TABLET, FILM COATED ORAL AT BEDTIME
Qty: 30 TABLET | Refills: 1 | Status: SHIPPED | OUTPATIENT
Start: 2017-12-28 | End: 2018-01-18

## 2017-12-28 NOTE — MR AVS SNAPSHOT
After Visit Summary   12/28/2017    Yara Edouard    MRN: [id number]           Patient Information     Date Of Birth          1980        Visit Information        Provider Department      12/28/2017 3:45 PM Satya Mendes MD [facility]        Today's Diagnoses     Other bipolar disorder (H)    -  1    PTSD (post-traumatic stress disorder)        Use of nonprescription opiate drugs        Bipolar affective disorder, currently depressed, moderate (H)          Care Instructions    Immediately abstain from kratom and all other opiates  After 5 days (starting Jan. 1), start naltrexone 12.5 mg and watch for W/D symptoms. If you are OK, increase to 25 mg for 2 days then 50 mg. Do not start using kratom while on naltrexone.  Continue other meds  Call if you have any problems. You understand my recommendation is to detox as an inpatient under medical supervision, so if you choose that route, come here to the ED or go to [facility] in [place].    Work closely with your sponsor and support system, go to lots of meetings.    Thank you for coming to the PSYCHIATRY CLINIC.    Lab Testing:  If you had lab testing today and your results are reassuring or normal they will be mailed to you or sent through [employer] within 7 days.   If the lab tests need quick action we will call you with the results.  The phone number we will call with results is # [phone] (home) . If this is not the best number please call our clinic and change the number.    Medication Refills:  If you need any refills please call your pharmacy and they will contact us. Our fax number for refills is [phone]. Please allow three business for refill processing.   If you need to  your refill at a new pharmacy, please contact the new pharmacy directly. The new pharmacy will help you get your medications transferred.     Scheduling:  If you have any concerns about today's visit or wish to schedule another appointment  please call our office during normal business hours [phone] (8-5:00 M-F)    Contact Us:  Please call [phone] during business hours (8-5:00 M-F).  If after clinic hours, or on the weekend, please call  [phone].    Financial Assistance [phone]  [facility] Billing [phone]  [facility] Billing [phone]  Medical Records [phone]      MENTAL HEALTH CRISIS NUMBERS:  [facility]:   [facility] - [phone]   Crisis Residence [facility] - [phone]   Walk-In Counseling Center [facility] - [phone]   COPE 24/7 [facility] Mobile Team for Adults - [[phone]]; Child - [[phone]]     Crisis Connection - [phone]     [facility] - [phone]   Walk-in counseling [facility] - [phone]   Walk-in counseling [facility] - [phone]   Crisis Residence [facility] - [phone]   Urgent Care Adult Mental Health:   --Drop-in, 24/7 crisis line, and [facility] Mobile Team [[phone]]    CRISIS TEXT LINE: Text [phone] from anywhere, anytime, any crisis 24/7;    OR SEE www.crisistextline.org     Poison Control Center - [phone]    CHILD: [facility] needs assessment team - [phone]     [facility] - [phone]; or [place] Lifeline - [phone]    If you have a medical emergency please call 911or go to the nearest ER.                    _____________________________________________    Again thank you for choosing PSYCH[facility] CLINIC and please let us know how we can best partner with you to improve you and your family's health.  You may be receiving a survey in the mail regarding this appointment. We would love to have your feedback, both positive and negative, so please fill out the survey and return it using the provided envolpe. The survey is done by an external company, so your answers are anonymous.             Follow-ups after your  visit        Follow-up notes from your care team     Return in about 2 weeks (around 1/11/2018), or at 1:15 on 1/11 or 1/18.      Your next 10 appointments already scheduled     Jan 18, 2018  1:15 PM CST   Schizophrenia Follow Up with Satya Mendes MD   Psychiatry Clinic ([facility])    [address]  [address] [facility] [address] [phone]              Who to contact     Please call your clinic at [phone] to:    Ask questions about your health    Make or cancel appointments    Discuss your medicines    Learn about your test results    Speak to your doctor   If you have compliments or concerns about an experience at your clinic, or if you wish to file a complaint, please contact [facility] Patient Relations at [phone] or email us at [email or web address]         Additional Information About Your Visit        [employer] Information     [employer] gives you secure access to your electronic health record. If you see a primary care provider, you can also send messages to your care team and make appointments. If you have questions, please call your primary care clinic.  If you do not have a primary care provider, please call [phone] and they will assist you.      [employer] is an electronic gateway that provides easy, online access to your medical records. With [employer], you can request a clinic appointment, read your test results, renew a prescription or communicate with your care team.     To access your existing account, please contact your [facility] Clinic or call [phone] for assistance.        Care EveryWhere ID     This is your Care EveryWhere ID. This could be used by other organizations to access your [place] medical records  [id number]        Your Vitals Were     Pulse BMI (Body Mass Index)                69 28.65 kg/m2           Blood Pressure from Last 3 Encounters:   12/28/17  150/90   09/28/17 137/83   04/27/17 131/83    Weight from Last 3 Encounters:   12/28/17 79.3 kg (174 lb 12.8 oz)   09/28/17 77.7 kg (171 lb 6.4 oz)   04/27/17 72.6 kg (160 lb)              Today, you had the following     No orders found for display         Today's Medication Changes          These changes are accurate as of: 12/28/17  5:04 PM.  If you have any questions, ask your nurse or doctor.               Start taking these medicines.        Dose/Directions    naltrexone 50 MG tablet   Commonly known as:  DEPADE;REVIA   Used for:  Use of nonprescription opiate drugs   Started by:  Satya Mendes MD        Take 1/4 tab daily after abstaining from all opiates for 5 days, then increase to 1/2 tab daily x 2 days, then 1 tab daily.   Quantity:  30 tablet   Refills:  1         Stop taking these medicines if you haven't already. Please contact your care team if you have questions.     vortioxetine 10 MG tablet   Commonly known as:  TRINTELLIX/BRINTELLIX   Stopped by:  Satya Mendes MD                Where to get your medicines      These medications were sent to [facility] [address] [place] [address] [address] AT [facility] OF  [address]  [address] [facility] [address]     Phone:  [phone]     mirtazapine 15 MG tablet    naltrexone 50 MG tablet    QUEtiapine 100 MG tablet                Primary Care Provider Office Phone # Fax #    [facility] Ori Meza [phone]       [facility] [address]  [facility] [address]        Equal Access to Services     [facility] MITCHEL AH: Hadii bibi Barnes, waaxda luqadaha, qaybta kaalmada adeayeshayada, desiree acuna. So [facility] [phone].    ATENCIÓN: Si habla español, tiene a galeana disposición servicios gratuitos de asistencia lingüística. Llame al [phone].    We comply with applicable federal civil rights laws and [place] laws. We do not discriminate on the basis  of race, color, national origin, age, disability, sex, sexual orientation, or gender identity.            Thank you!     Thank you for choosing PS[facility]ATRY CLINIC  for your care. Our goal is always to provide you with excellent care. Hearing back from our patients is one way we can continue to improve our services. Please take a few minutes to complete the written survey that you may receive in the mail after your visit with us. Thank you!             Your Updated Medication List - Protect others around you: Learn how to safely use, store and throw away your medicines at www.disposemymeds.org.          This list is accurate as of: 12/28/17  5:04 PM.  Always use your most recent med list.                   Brand Name Dispense Instructions for use Diagnosis    cloNIDine 0.1 MG tablet    CATAPRES    90 tablet    Take 1 tablet (0.1 mg) by mouth 3 times daily as needed Anxiety or insomnia    PTSD (post-traumatic stress disorder)       ibuprofen 200 MG tablet    ADVIL/MOTRIN     Take 800 mg by mouth every 6 hours as needed        * lamoTRIgine 150 MG tablet    LaMICtal    30 tablet    Take 1 tablet (150 mg) by mouth daily along with 200 mg tab at night    Other bipolar disorder (H)       * lamoTRIgine 200 MG tablet    LaMICtal    30 tablet    Take 1 tablet (200 mg) by mouth At Bedtime with 150 mg every morning    PTSD (post-traumatic stress disorder)       mirtazapine 15 MG tablet    REMERON    30 tablet    Take 1 tablet (15 mg) by mouth At Bedtime    Bipolar affective disorder, currently depressed, moderate (H)       naltrexone 50 MG tablet    DEPADE;REVIA    30 tablet    Take 1/4 tab daily after abstaining from all opiates for 5 days, then increase to 1/2 tab daily x 2 days, then 1 tab daily.    Use of nonprescription opiate drugs       ondansetron 4 MG ODT tab    ZOFRAN-ODT    30 tablet    Take 1 tablet (4 mg) by mouth every 4 hours as needed for nausea    Nausea       phenazopyridine 200 MG tablet    PYRIDIUM    6  tablet    Take 1 tablet (200 mg) by mouth 3 times daily as needed for pain        propranolol 40 MG tablet    INDERAL    100 tablet    Take 1 tablet (40 mg) by mouth 4 times daily as needed for tremor    PTSD (post-traumatic stress disorder)       QUEtiapine 100 MG tablet    SEROquel    60 tablet    Take 1-2 tablets (100-200 mg) by mouth nightly as needed    PTSD (post-traumatic stress disorder)       sucralfate 1 GM tablet    CARAFATE     Take 1 g by mouth 2 times daily        * Notice:  This list has 2 medication(s) that are the same as other medications prescribed for you. Read the directions carefully, and ask your doctor or other care provider to review them with you.

## 2017-12-28 NOTE — PATIENT INSTRUCTIONS
Immediately abstain from kratom and all other opiates  After 5 days (starting Jan. 1), start naltrexone 12.5 mg and watch for W/D symptoms. If you are OK, increase to 25 mg for 2 days then 50 mg. Do not start using kratom while on naltrexone.  Continue other meds  Call if you have any problems. You understand my recommendation is to detox as an inpatient under medical supervision, so if you choose that route, come here to the ED or go to [facility] in [place].    Work closely with your sponsor and support system, go to lots of meetings.    Thank you for coming to the PSYCHIATRY CLINIC.    Lab Testing:  If you had lab testing today and your results are reassuring or normal they will be mailed to you or sent through [employer] within 7 days.   If the lab tests need quick action we will call you with the results.  The phone number we will call with results is # [phone] (home) . If this is not the best number please call our clinic and change the number.    Medication Refills:  If you need any refills please call your pharmacy and they will contact us. Our fax number for refills is [phone]. Please allow three business for refill processing.   If you need to  your refill at a new pharmacy, please contact the new pharmacy directly. The new pharmacy will help you get your medications transferred.     Scheduling:  If you have any concerns about today's visit or wish to schedule another appointment please call our office during normal business hours [phone] (8-5:00 M-F)    Contact Us:  Please call [phone] during business hours (8-5:00 M-F).  If after clinic hours, or on the weekend, please call  [phone].    Financial Assistance [phone]  [facility] Billing [phone]  [employer] Billing [phone]  Medical Records [phone]      MENTAL HEALTH CRISIS NUMBERS:  [facility]:   [facility] - [phone]   Crisis Residence [facility] [place] [facility] - [phone]    Walk-In Counseling Center [facility] - [phone]   COPE 24/7 [facility] Mobile Team for Adults - [[phone]]; Child - [[phone]]     Crisis Connection - [phone]     [facility]:   [facility] - [phone]   Walk-in counseling [facility] - [phone]   Walk-in counseling [facility] - [phone]   Crisis Residence [facility] - [phone]   Urgent Care Adult Mental Health:   --Drop-in, 24/7 crisis line, and [place] Mobile Team [[phone]]    CRISIS TEXT LINE: Text [phone] from anywhere, anytime, any crisis 24/7;    OR SEE www.crisistextline.org     Poison Control Center - [phone]    CHILD: [facility] needs assessment team - [phone]     [facility] Lifeline - [phone]; or [facility] ED Lifeline - [phone]    If you have a medical emergency please call 911or go to the nearest ER.                    _____________________________________________    Again thank you for choosing PSYCHIATRY CLINIC and please let us know how we can best partner with you to improve you and your family's health.  You may be receiving a survey in the mail regarding this appointment. We would love to have your feedback, both positive and negative, so please fill out the survey and return it using the provided envolpe. The survey is done by an external company, so your answers are anonymous.
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
## 2018-01-04 ENCOUNTER — HOSPITAL ENCOUNTER (INPATIENT)
Facility: CLINIC | Age: 38
LOS: 5 days | Discharge: HOME | DRG: 897 | End: 2018-01-09
Attending: PSYCHIATRY & NEUROLOGY | Admitting: PSYCHIATRY & NEUROLOGY
Payer: COMMERCIAL

## 2018-01-04 DIAGNOSIS — F19.20 CHEMICAL DEPENDENCY (H): ICD-10-CM

## 2018-01-04 LAB
AMPHETAMINES UR QL SCN: NEGATIVE
BARBITURATES UR QL: NEGATIVE
BENZODIAZ UR QL: NEGATIVE
CANNABINOIDS UR QL SCN: NEGATIVE
COCAINE UR QL: NEGATIVE
ETHANOL UR QL SCN: NEGATIVE
HCG UR QL: NEGATIVE
OPIATES UR QL SCN: NEGATIVE

## 2018-01-04 PROCEDURE — 25000132 ZZH RX MED GY IP 250 OP 250 PS 637: Performed by: PSYCHIATRY & NEUROLOGY

## 2018-01-04 PROCEDURE — HZ2ZZZZ DETOXIFICATION SERVICES FOR SUBSTANCE ABUSE TREATMENT: ICD-10-PCS | Performed by: PSYCHIATRY & NEUROLOGY

## 2018-01-04 PROCEDURE — 12800008 ZZH R&B CD ADULT

## 2018-01-04 PROCEDURE — 25000125 ZZHC RX 250: Performed by: PSYCHIATRY & NEUROLOGY

## 2018-01-04 PROCEDURE — 80320 DRUG SCREEN QUANTALCOHOLS: CPT | Performed by: FAMILY MEDICINE

## 2018-01-04 PROCEDURE — 99284 EMERGENCY DEPT VISIT MOD MDM: CPT | Mod: Z6 | Performed by: PSYCHIATRY & NEUROLOGY

## 2018-01-04 PROCEDURE — 99285 EMERGENCY DEPT VISIT HI MDM: CPT | Mod: 25 | Performed by: PSYCHIATRY & NEUROLOGY

## 2018-01-04 PROCEDURE — 81025 URINE PREGNANCY TEST: CPT | Performed by: FAMILY MEDICINE

## 2018-01-04 PROCEDURE — 80307 DRUG TEST PRSMV CHEM ANLYZR: CPT | Performed by: FAMILY MEDICINE

## 2018-01-04 RX ORDER — CHLORAL HYDRATE 500 MG
1000 CAPSULE ORAL 2 TIMES DAILY
Status: DISCONTINUED | OUTPATIENT
Start: 2018-01-05 | End: 2018-01-09 | Stop reason: HOSPADM

## 2018-01-04 RX ORDER — MULTIVITAMIN,THERAPEUTIC
1 TABLET ORAL 2 TIMES DAILY
COMMUNITY
End: 2018-04-19

## 2018-01-04 RX ORDER — PROPRANOLOL HYDROCHLORIDE 20 MG/1
40 TABLET ORAL 4 TIMES DAILY PRN
Status: DISCONTINUED | OUTPATIENT
Start: 2018-01-04 | End: 2018-01-09 | Stop reason: HOSPADM

## 2018-01-04 RX ORDER — BISACODYL 5 MG/1
10 TABLET, DELAYED RELEASE ORAL AT BEDTIME
COMMUNITY

## 2018-01-04 RX ORDER — THEANINE 100 MG
1 CAPSULE ORAL 2 TIMES DAILY PRN
Status: DISCONTINUED | OUTPATIENT
Start: 2018-01-04 | End: 2018-01-04 | Stop reason: RX

## 2018-01-04 RX ORDER — BISACODYL 10 MG
10 SUPPOSITORY, RECTAL RECTAL DAILY PRN
Status: DISCONTINUED | OUTPATIENT
Start: 2018-01-04 | End: 2018-01-09 | Stop reason: HOSPADM

## 2018-01-04 RX ORDER — SUCRALFATE 1 G/1
1 TABLET ORAL 2 TIMES DAILY
Status: DISCONTINUED | OUTPATIENT
Start: 2018-01-04 | End: 2018-01-04

## 2018-01-04 RX ORDER — LAMOTRIGINE 100 MG/1
200 TABLET ORAL AT BEDTIME
Status: DISCONTINUED | OUTPATIENT
Start: 2018-01-04 | End: 2018-01-09 | Stop reason: HOSPADM

## 2018-01-04 RX ORDER — CHLORAL HYDRATE 500 MG
1000 CAPSULE ORAL 4 TIMES DAILY
COMMUNITY

## 2018-01-04 RX ORDER — THEANINE 100 MG
1 CAPSULE ORAL 2 TIMES DAILY PRN
COMMUNITY

## 2018-01-04 RX ORDER — MULTIVIT WITH MINERALS/LUTEIN
1000 TABLET ORAL DAILY
COMMUNITY

## 2018-01-04 RX ORDER — IBUPROFEN 600 MG/1
600 TABLET, FILM COATED ORAL EVERY 6 HOURS PRN
Status: DISCONTINUED | OUTPATIENT
Start: 2018-01-04 | End: 2018-01-09 | Stop reason: HOSPADM

## 2018-01-04 RX ORDER — ALUMINA, MAGNESIA, AND SIMETHICONE 2400; 2400; 240 MG/30ML; MG/30ML; MG/30ML
30 SUSPENSION ORAL EVERY 4 HOURS PRN
Status: DISCONTINUED | OUTPATIENT
Start: 2018-01-04 | End: 2018-01-09 | Stop reason: HOSPADM

## 2018-01-04 RX ORDER — ONDANSETRON 4 MG/1
4 TABLET, ORALLY DISINTEGRATING ORAL EVERY 4 HOURS PRN
Status: DISCONTINUED | OUTPATIENT
Start: 2018-01-04 | End: 2018-01-05

## 2018-01-04 RX ORDER — ASCORBIC ACID 500 MG
1000 TABLET ORAL DAILY
Status: DISCONTINUED | OUTPATIENT
Start: 2018-01-05 | End: 2018-01-09 | Stop reason: HOSPADM

## 2018-01-04 RX ORDER — GABAPENTIN 100 MG/1
100 CAPSULE ORAL 3 TIMES DAILY
Refills: 3 | Status: ON HOLD | COMMUNITY
Start: 2017-11-19 | End: 2018-01-09

## 2018-01-04 RX ORDER — ONDANSETRON 4 MG/1
8 TABLET, ORALLY DISINTEGRATING ORAL ONCE
Status: COMPLETED | OUTPATIENT
Start: 2018-01-04 | End: 2018-01-04

## 2018-01-04 RX ORDER — ALBUTEROL SULFATE 90 UG/1
2 AEROSOL, METERED RESPIRATORY (INHALATION) EVERY 6 HOURS
COMMUNITY
End: 2019-07-11

## 2018-01-04 RX ORDER — CLONIDINE HYDROCHLORIDE 0.1 MG/1
0.2 TABLET ORAL ONCE
Status: COMPLETED | OUTPATIENT
Start: 2018-01-04 | End: 2018-01-04

## 2018-01-04 RX ORDER — SUCRALFATE 1 G/1
1 TABLET ORAL 2 TIMES DAILY PRN
Status: DISCONTINUED | OUTPATIENT
Start: 2018-01-04 | End: 2018-01-09 | Stop reason: HOSPADM

## 2018-01-04 RX ORDER — MULTIVITAMIN,THERAPEUTIC
1 TABLET ORAL DAILY
Status: DISCONTINUED | OUTPATIENT
Start: 2018-01-05 | End: 2018-01-09 | Stop reason: HOSPADM

## 2018-01-04 RX ORDER — ACETAMINOPHEN 325 MG/1
650 TABLET ORAL EVERY 4 HOURS PRN
Status: DISCONTINUED | OUTPATIENT
Start: 2018-01-04 | End: 2018-01-09 | Stop reason: HOSPADM

## 2018-01-04 RX ORDER — NAPROXEN 500 MG/1
1000 TABLET ORAL DAILY
COMMUNITY
End: 2018-04-19

## 2018-01-04 RX ORDER — ALBUTEROL SULFATE 90 UG/1
2 AEROSOL, METERED RESPIRATORY (INHALATION) EVERY 6 HOURS PRN
Status: DISCONTINUED | OUTPATIENT
Start: 2018-01-04 | End: 2018-01-09 | Stop reason: HOSPADM

## 2018-01-04 RX ORDER — QUETIAPINE FUMARATE 100 MG/1
100 TABLET, FILM COATED ORAL
Status: DISCONTINUED | OUTPATIENT
Start: 2018-01-04 | End: 2018-01-08

## 2018-01-04 RX ORDER — LAMOTRIGINE 150 MG/1
150 TABLET ORAL DAILY
Status: DISCONTINUED | OUTPATIENT
Start: 2018-01-05 | End: 2018-01-09 | Stop reason: HOSPADM

## 2018-01-04 RX ORDER — BISACODYL 5 MG
10 TABLET, DELAYED RELEASE (ENTERIC COATED) ORAL DAILY PRN
Status: DISCONTINUED | OUTPATIENT
Start: 2018-01-04 | End: 2018-01-09 | Stop reason: HOSPADM

## 2018-01-04 RX ORDER — HYDROXYZINE HYDROCHLORIDE 50 MG/1
50-100 TABLET, FILM COATED ORAL EVERY 4 HOURS PRN
Status: DISCONTINUED | OUTPATIENT
Start: 2018-01-04 | End: 2018-01-09 | Stop reason: HOSPADM

## 2018-01-04 RX ORDER — PHENOL 1.4 %
10 AEROSOL, SPRAY (ML) MUCOUS MEMBRANE AT BEDTIME
COMMUNITY

## 2018-01-04 RX ORDER — CLONIDINE HYDROCHLORIDE 0.1 MG/1
0.1 TABLET ORAL 3 TIMES DAILY PRN
Status: DISCONTINUED | OUTPATIENT
Start: 2018-01-04 | End: 2018-01-08

## 2018-01-04 RX ORDER — MIRTAZAPINE 15 MG/1
15 TABLET, FILM COATED ORAL AT BEDTIME
Status: DISCONTINUED | OUTPATIENT
Start: 2018-01-04 | End: 2018-01-09 | Stop reason: HOSPADM

## 2018-01-04 RX ORDER — VORTIOXETINE 10 MG/1
10 TABLET, FILM COATED ORAL AT BEDTIME
Status: ON HOLD | COMMUNITY
Start: 2017-11-29 | End: 2018-01-08

## 2018-01-04 RX ADMIN — MIRTAZAPINE 15 MG: 15 TABLET, FILM COATED ORAL at 21:05

## 2018-01-04 RX ADMIN — MELATONIN 5 MG TABLET 10 MG: at 21:06

## 2018-01-04 RX ADMIN — QUETIAPINE FUMARATE 100 MG: 100 TABLET, FILM COATED ORAL at 21:05

## 2018-01-04 RX ADMIN — NICOTINE POLACRILEX 8 MG: 4 GUM, CHEWING ORAL at 21:23

## 2018-01-04 RX ADMIN — HYDROXYZINE HYDROCHLORIDE 100 MG: 50 TABLET, FILM COATED ORAL at 21:06

## 2018-01-04 RX ADMIN — CLONIDINE HYDROCHLORIDE 0.2 MG: 0.1 TABLET ORAL at 18:13

## 2018-01-04 RX ADMIN — LAMOTRIGINE 200 MG: 100 TABLET ORAL at 21:05

## 2018-01-04 RX ADMIN — ONDANSETRON 4 MG: 4 TABLET, ORALLY DISINTEGRATING ORAL at 21:22

## 2018-01-04 RX ADMIN — ONDANSETRON 8 MG: 4 TABLET, ORALLY DISINTEGRATING ORAL at 16:42

## 2018-01-04 ASSESSMENT — ENCOUNTER SYMPTOMS
HALLUCINATIONS: 0
NERVOUS/ANXIOUS: 0
DYSPHORIC MOOD: 0
FEVER: 0
DIZZINESS: 0
NAUSEA: 1
ABDOMINAL PAIN: 0
TREMORS: 1
SHORTNESS OF BREATH: 0
CHEST TIGHTNESS: 0
BACK PAIN: 0

## 2018-01-04 ASSESSMENT — ACTIVITIES OF DAILY LIVING (ADL)
DRESS: INDEPENDENT
GROOMING: HANDWASHING;INDEPENDENT
ORAL_HYGIENE: INDEPENDENT
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
## 2018-01-04 NOTE — IP AVS SNAPSHOT
[facility]    [address] [place] [id number]    Phone:  [phone]                                       After Visit Summary   1/4/2018    Yara Edouard    MRN: [id number]           After Visit Summary Signature Page     I have received my discharge instructions, and my questions have been answered. I have discussed any challenges I see with this plan with the nurse or doctor.    ..........................................................................................................................................  Patient/Patient Representative Signature      ..........................................................................................................................................  Patient Representative Print Name and Relationship to Patient    ..................................................               ................................................  Date                                            Time    ..........................................................................................................................................  Reviewed by Signature/Title    ...................................................              ..............................................  Date                                                            Time

## 2018-01-04 NOTE — IP AVS SNAPSHOT
MRN:[id number]                      After Visit Summary   1/4/2018    Yara Edouard    MRN: [id number]           Thank you!     Thank you for choosing [facility] for your care. Our goal is always to provide you with excellent care.        Patient Information     Date Of Birth          1980        Designated Caregiver       Most Recent Value    Caregiver    Will someone help with your care after discharge? no      About your hospital stay     You were admitted on:  January 4, 2018 You last received care in the:  [facility]    You were discharged on:  January 9, 2018       Who to Call     For medical emergencies, please call 911.  For non-urgent questions about your medical care, please call your primary care provider or clinic, [phone]          Attending Provider     Provider Specialty    Ricci Dempsey MD Emergency Medicine    [facility]       Primary Care Provider Office Phone # Fax #    Neyda Ori Meza [phone]      Your next 10 appointments already scheduled     Jan 09, 2018  1:15 PM CST   Evaluation with [place] [facility] ([facility])    [address] [address]   [phone]            Jan 16, 2018 11:20 AM CST   New Visit with Maine Fox MD   [facility] ([facility])    [address] [place] [address]  [address]  [facility] [address]   [phone]            Jan 18, 2018  1:15 PM CST   Schizophrenia Follow Up with Satya Mendes MD   Psychiatry Clinic ([facility])    [address]  [address] [facility] [address]   [phone]              Further instructions from your care team       Behavioral Discharge Planning and Instructions  THANK YOU FOR CHOOSING THE [facility],  [address]  [phone]    Summary: You were admitted to [facility] on 9/17/2017 for detoxification from Kratom and Neurontin abuse.  A medical exam was performed that included lab work. You have met with a  and initially planned to return home and resume outpatient treatment services at [facility], individual psychotherapy and  Meetings/sponsor. However, during your detox stay, you requested placement in [facility] - a chemical use evaluation is scheduled for your day of discharge - see details below.  Please take care and make your recovery a daily priority, Yara!     Recommendation:  As noted above    Main Diagnoses:  Per Dr. Liang;  Severe opioid use disorder, specifically kratom   h/o Borderline personality disorder  PTSD   h/o Bipolar disorder type II     Major Treatments, Procedures and Findings:  You were treated for Kratom detoxification using subutex and for neurontin detoxification using a neurontin taper. As an outpatient you will be prescribed suboxone, please take this medication as prescribed until it is gone. You had a chemical dependency assessment. You had labs drawn and those results were reviewed with you. Please take a copy of your lab work with you to your next primary care physician appointment.    Symptoms to Report:  If you experience more anxiety, confusion, sleeplessness, deep sadness or thoughts of suicide, notify your treatment team or notify your primary care physician. IF ANY OF THE SYMPTOMS YOU ARE EXPERIENCING ARE A MEDICAL EMERGENCY CALL 911 IMMEDIATELY.     Lifestyle Adjustment: Adjust your lifestyle to get enough sleep, relaxation, exercise and good nutrition. Continue to develop healthy coping skills to decrease stress and promote a sober living environment. Do not use mood altering substances including alcohol, illegal drugs or addictive medications other than what is currently prescribed.     Follow up Appointments:  Jan 16, 2018 11:20 AM CST   Return  Visit with Maine Fox MD   [facility] ([facility])    [address]  [address]  [facility] [id number] [phone]        Jan 18, 2018  1:15 PM CST   Schizophrenia Follow Up with Satya Mendes MD   Psychiatry Clinic ([facility])    [address]  [address] [facility] [id number] [phone]        Chemical Use Evaluation Appointment - You are requesting placement in [facility]:  Tuesday, January 9, 2018 at 1:15 PM  [facility]  [address]  [place] [address]  Ph:  [phone]    Internal Medicine recommends that you have a repeat fasting lipid profile in 4-6 weeks.    Resources:   AA/NA and Sponsors are excellent resources for support and you can find one at any support group meeting.   SMART Recovery - self management for addiction recovery:  www.smartrecovery.org  http://www.aastpaul.org/?topic=8  http://a[place].org/meetings/  http://www.nam[place].org/index.php/meeting-list-pdf  [facility] ~ A Health Crisis Resource & Support Center:  207.555.5402.  http://www.harmreduction.org  [facility] 420-681-5354  Support Group:  AA/NA and Sponsor/support  Crisis Intervention: 469.931.8303 or 108-928-2352 (TTY: 549.844.4747).  Call anytime for help.  National [place] on Mental Illness (www.[place].gian.org): 500.435.1166 or 689-246-0719.  Alcoholics Anonymous (www.alcoholics-anonymous.org): Check your phone book for your local chapter.  Suicide Awareness Voices of Education (SAVE) (www.save.org): 112-407-IZNP (5678)  National Suicide Prevention Line (www.mentalhealthmn.org): 481-133-UWLK (6390)  Mental Health Consumer/Survivor Network of [place] (www.mhcsn.net): 396.974.7170 or 598-888-1256  Mental Health Association of [place] (www.mentalhealth.org): 579-416-4904 or 744-317-5332   Substance Abuse and Mental Health Services  "(www.[facility] Centera.gov)    [facility] ([facility])  [facility] connects people seeking recovery to resources that help foster and sustain long-term recovery.  Whether you are seeking resources for treatment, transportation, housing, job training, education, health care or other pathways to recovery, [facility] is a great place to start.  [phone].  www.[facility].org    General Medication Instructions:   See your medication sheet(s) for instructions.   Take all medicines as directed.  Make no changes unless your doctor suggests them.   Go to all your doctor visits.  Be sure to have all your required lab tests. This way, your medicines can be refilled on time.  Do not use any forms of alcohol.    Please Note:  If you have any questions at anytime after you are discharged please call the [facility], [place] detox unit 3AW unit at [phone].  [facility], Behavioral Intake [phone]  Please take this discharge folder with you to all your follow up appointments, it contains your lab results, diagnosis, medication list and discharge recommendations.      THANK YOU FOR CHOOSING THE [facility]                        Pending Results     No orders found from 1/2/2018 to 1/5/2018.            Statement of Approval     Ordered          01/09/18 0844  I have reviewed and agree with all the recommendations and orders detailed in this document.  EFFECTIVE NOW     Approved and electronically signed by:  Keiko Liang MD             Admission Information     Date & Time Provider Department Dept. Phone    1/4/2018 Keiko Liang MD [facility] [phone]      Your Vitals Were     Blood Pressure Pulse Temperature Respirations Height Weight    113/71 75 98.3  F (36.8  C) (Oral) 16 1.676 m (5' 6\") 78 kg (172 lb)    Pulse Oximetry BMI (Body Mass Index)                100% 27.76 kg/m2          MyChart Information     MyChart " gives you secure access to your electronic health record. If you see a primary care provider, you can also send messages to your care team and make appointments. If you have questions, please call your primary care clinic.  If you do not have a primary care provider, please call [phone] and they will assist you.        Care EveryWhere ID     This is your Care EveryWhere ID. This could be used by other organizations to access your [facility] medical records  [id number]        Equal Access to Services     TRISTAN GRULLON : Hadstephenie chawlao Sofrandy, waaxda luqadaha, qaybta kaalmada adeayeshayada, desiree hurd . So [facility] [phone].    ATENCIÓN: Si habla español, tiene a galeana disposición servicios gratuitos de asistencia lingüística. Ll[facility]ency [facility] [phone].    We comply with applicable federal civil rights laws and [place] laws. We do not discriminate on the basis of race, color, national origin, age, disability, sex, sexual orientation, or gender identity.               Review of your medicines      START taking        Dose / Directions    buprenorphine-naloxone 8-2 MG Subl sublingual tablet   Commonly known as:  SUBOXONE   Used for:  Chemical dependency (H)        Dose:  1 tablet   Place 1 tablet under the tongue daily   Quantity:  7 each   Refills:  0       DEPLIN 7.5 MG Tabs        Dose:  7.5 mg   Start taking on:  1/10/2018   Take 7.5 mg by mouth daily   Quantity:  30 tablet   Refills:  0       gabapentin 600 MG tablet   Commonly known as:  NEURONTIN   Used for:  Chemical dependency (H)   Replaces:  gabapentin 100 MG capsule        Dose:  600 mg   Take 1 tablet (600 mg) by mouth 3 times daily   Quantity:  90 tablet   Refills:  0       nicotine polacrilex 4 MG gum   Commonly known as:  NICORETTE   Used for:  Chemical dependency (H)        Dose:  4-8 mg   Place 1-2 each (4-8 mg) inside cheek every hour as needed for smoking cessation or other (nicotine withdrawal symptoms)   Quantity:   270 tablet   Refills:  0       omeprazole 20 MG CR capsule   Commonly known as:  priLOSEC   Used for:  Chemical dependency (H)        Dose:  20 mg   Start taking on:  1/10/2018   Take 1 capsule (20 mg) by mouth every morning   Quantity:  30 capsule   Refills:  0         CONTINUE these medicines which may have CHANGED, or have new prescriptions. If we are uncertain of the size of tablets/capsules you have at home, strength may be listed as something that might have changed.        Dose / Directions    CATAPRES 0.1 MG tablet   This may have changed:    - reasons to take this  - additional instructions   Used for:  Chemical dependency (H)   Generic drug:  cloNIDine        Dose:  0.1 mg   Take 1 tablet (0.1 mg) by mouth 3 times daily as needed (anxiety)   Quantity:  60 tablet   Refills:  0         CONTINUE these medicines which have NOT CHANGED        Dose / Directions    7-KETO LEAN PO        Dose:  1 tablet   Take 1 tablet by mouth daily   Refills:  0       albuterol 108 (90 BASE) MCG/ACT Inhaler   Commonly known as:  PROAIR HFA/PROVENTIL HFA/VENTOLIN HFA        Dose:  2 puff   Inhale 2 puffs into the lungs every 6 hours   Refills:  0       ascorbic acid 1000 MG Tabs   Commonly known as:  vitamin C        Dose:  1000 mg   Take 1,000 mg by mouth daily   Refills:  0       Calcium-Magnesium-Zinc 167-83-8 MG Tabs        Dose:  1 tablet   Take 1 tablet by mouth every morning   Refills:  0       cholecalciferol 26903 UNITS capsule   Commonly known as:  vitamin D3        Dose:  93400 Units   Take 10,000 Units by mouth daily   Refills:  0       chromium 200 MCG Caps capsule        Dose:  200 mcg   Take 200 mcg by mouth daily   Refills:  0       DULCOLAX 5 MG EC tablet   Generic drug:  bisacodyl        Dose:  10 mg   Take 10 mg by mouth At Bedtime   Refills:  0       fish oil-omega-3 fatty acids 1000 MG capsule        Dose:  1000 mg   Take 1,000 mg by mouth 4 times daily   Refills:  0       ibuprofen 200 MG tablet   Commonly  known as:  ADVIL/MOTRIN        Dose:  800 mg   Take 800 mg by mouth every 6 hours as needed   Refills:  0       L-Theanine 100 MG Caps        Dose:  1 capsule   Take 1 capsule by mouth 2 times daily as needed   Refills:  0       * lamoTRIgine 150 MG tablet   Commonly known as:  LaMICtal   Used for:  Other bipolar disorder (H)        Dose:  150 mg   Take 1 tablet (150 mg) by mouth daily along with 200 mg tab at night   Quantity:  30 tablet   Refills:  3       * lamoTRIgine 200 MG tablet   Commonly known as:  LaMICtal   Used for:  PTSD (post-traumatic stress disorder)        Dose:  200 mg   Take 1 tablet (200 mg) by mouth At Bedtime with 150 mg every morning   Quantity:  30 tablet   Refills:  1       Melatonin 10 MG Tabs tablet        Dose:  10 mg   Take 10 mg by mouth At Bedtime   Refills:  0       mirtazapine 15 MG tablet   Commonly known as:  REMERON   Used for:  Bipolar affective disorder, currently depressed, moderate (H)        Dose:  15 mg   Take 1 tablet (15 mg) by mouth At Bedtime   Quantity:  30 tablet   Refills:  1       multivitamin, therapeutic Tabs tablet        Dose:  1 tablet   Take 1 tablet by mouth 2 times daily   Refills:  0       naproxen 500 MG tablet   Commonly known as:  NAPROSYN        Dose:  1000 mg   Take 1,000 mg by mouth daily   Refills:  0       phenazopyridine 200 MG tablet   Commonly known as:  PYRIDIUM        Dose:  200 mg   Take 1 tablet (200 mg) by mouth 3 times daily as needed for pain   Quantity:  6 tablet   Refills:  0       propranolol 40 MG tablet   Commonly known as:  INDERAL   Used for:  PTSD (post-traumatic stress disorder)        Dose:  40 mg   Take 1 tablet (40 mg) by mouth 4 times daily as needed for tremor   Quantity:  100 tablet   Refills:  3       QUEtiapine 100 MG tablet   Commonly known as:  SEROquel   Used for:  PTSD (post-traumatic stress disorder)        Dose:  100-200 mg   Take 1-2 tablets (100-200 mg) by mouth nightly as needed   Quantity:  60 tablet   Refills:  1        sucralfate 1 GM tablet   Commonly known as:  CARAFATE        Dose:  1 g   Take 1 g by mouth 2 times daily   Refills:  0       * Notice:  This list has 2 medication(s) that are the same as other medications prescribed for you. Read the directions carefully, and ask your doctor or other care provider to review them with you.      STOP taking     gabapentin 100 MG capsule   Commonly known as:  NEURONTIN   Replaced by:  gabapentin 600 MG tablet           naltrexone 50 MG tablet   Commonly known as:  DEPADE;REVIA           ondansetron 4 MG ODT tab   Commonly known as:  ZOFRAN-ODT           TRINTELLIX 10 MG tablet   Generic drug:  vortioxetine                Where to get your medicines      These medications were sent to [facility] [place], [place] - [address]  [address] [address]     Phone:  [phone]     nicotine polacrilex 4 MG gum    omeprazole 20 MG CR capsule         Some of these will need a paper prescription and others can be bought over the counter. Ask your nurse if you have questions.     Bring a paper prescription for each of these medications     buprenorphine-naloxone 8-2 MG Subl sublingual tablet                Protect others around you: Learn how to safely use, store and throw away your medicines at www.disposemymeds.org.             Medication List: This is a list of all your medications and when to take them. Check marks below indicate your daily home schedule. Keep this list as a reference.      Medications           Morning Afternoon Evening Bedtime As Needed    7-KETO LEAN PO   Take 1 tablet by mouth daily                                albuterol 108 (90 BASE) MCG/ACT Inhaler   Commonly known as:  PROAIR HFA/PROVENTIL HFA/VENTOLIN HFA   Inhale 2 puffs into the lungs every 6 hours                                ascorbic acid 1000 MG Tabs   Commonly known as:  vitamin C   Take 1,000 mg by mouth daily   Last time this was given:  1,000 mg on  1/9/2018  8:42 AM                                buprenorphine-naloxone 8-2 MG Subl sublingual tablet   Commonly known as:  SUBOXONE   Place 1 tablet under the tongue daily                                Calcium-Magnesium-Zinc 167-83-8 MG Tabs   Take 1 tablet by mouth every morning                                CATAPRES 0.1 MG tablet   Take 1 tablet (0.1 mg) by mouth 3 times daily as needed (anxiety)   Last time this was given:  0.1 mg on 1/9/2018  8:40 AM   Generic drug:  cloNIDine                                cholecalciferol 15944 UNITS capsule   Commonly known as:  vitamin D3   Take 10,000 Units by mouth daily   Last time this was given:  5,000 Units on 1/9/2018  8:40 AM                                chromium 200 MCG Caps capsule   Take 200 mcg by mouth daily                                DEPLIN 7.5 MG Tabs   Take 7.5 mg by mouth daily   Start taking on:  1/10/2018   Last time this was given:  7.5 mg on 1/9/2018  8:40 AM                                DULCOLAX 5 MG EC tablet   Take 10 mg by mouth At Bedtime   Generic drug:  bisacodyl                                fish oil-omega-3 fatty acids 1000 MG capsule   Take 1,000 mg by mouth 4 times daily   Last time this was given:  1,000 mg on 1/9/2018  8:40 AM                                gabapentin 600 MG tablet   Commonly known as:  NEURONTIN   Take 1 tablet (600 mg) by mouth 3 times daily   Last time this was given:  600 mg on 1/9/2018  8:40 AM                                ibuprofen 200 MG tablet   Commonly known as:  ADVIL/MOTRIN   Take 800 mg by mouth every 6 hours as needed   Last time this was given:  600 mg on 1/8/2018 11:39 AM                                L-Theanine 100 MG Caps   Take 1 capsule by mouth 2 times daily as needed                                * lamoTRIgine 150 MG tablet   Commonly known as:  LaMICtal   Take 1 tablet (150 mg) by mouth daily along with 200 mg tab at night   Last time this was given:  150 mg on 1/9/2018  8:40 AM                                 * lamoTRIgine 200 MG tablet   Commonly known as:  LaMICtal   Take 1 tablet (200 mg) by mouth At Bedtime with 150 mg every morning   Last time this was given:  150 mg on 1/9/2018  8:40 AM                                Melatonin 10 MG Tabs tablet   Take 10 mg by mouth At Bedtime   Last time this was given:  10 mg on 1/8/2018  9:16 PM                                mirtazapine 15 MG tablet   Commonly known as:  REMERON   Take 1 tablet (15 mg) by mouth At Bedtime   Last time this was given:  15 mg on 1/8/2018  9:16 PM                                multivitamin, therapeutic Tabs tablet   Take 1 tablet by mouth 2 times daily   Last time this was given:  1 tablet on 1/9/2018  8:40 AM                                naproxen 500 MG tablet   Commonly known as:  NAPROSYN   Take 1,000 mg by mouth daily                                nicotine polacrilex 4 MG gum   Commonly known as:  NICORETTE   Place 1-2 each (4-8 mg) inside cheek every hour as needed for smoking cessation or other (nicotine withdrawal symptoms)   Last time this was given:  8 mg on 1/9/2018 11:08 AM                                omeprazole 20 MG CR capsule   Commonly known as:  priLOSEC   Take 1 capsule (20 mg) by mouth every morning   Start taking on:  1/10/2018   Last time this was given:  20 mg on 1/9/2018  8:41 AM                                phenazopyridine 200 MG tablet   Commonly known as:  PYRIDIUM   Take 1 tablet (200 mg) by mouth 3 times daily as needed for pain                                propranolol 40 MG tablet   Commonly known as:  INDERAL   Take 1 tablet (40 mg) by mouth 4 times daily as needed for tremor   Last time this was given:  40 mg on 1/9/2018 11:08 AM                                QUEtiapine 100 MG tablet   Commonly known as:  SEROquel   Take 1-2 tablets (100-200 mg) by mouth nightly as needed   Last time this was given:  200 mg on 1/8/2018  9:16 PM                                sucralfate 1 GM tablet    Commonly known as:  CARAFATE   Take 1 g by mouth 2 times daily                                * Notice:  This list has 2 medication(s) that are the same as other medications prescribed for you. Read the directions carefully, and ask your doctor or other care provider to review them with you.

## 2018-01-04 NOTE — IP AVS SNAPSHOT
" [facility]: [phone]                                              INTERAGENCY TRANSFER FORM - LAB / IMAGING / EKG / EMG RESULTS   2018                    Hospital Admission Date: 2018  NACHO STOVER   : 1980  Sex: Female        Attending Provider: Keiko Liang MD     Allergies:  Ceclor [Cefaclor Monohydrate], Erythromycin, Wellbutrin [Bupropion Hydrobromide]    Infection:  None   Service:  CHEMICAL DEP    Ht:  1.676 m (5' 6\")   Wt:  78 kg (172 lb)   Admission Wt:  78 kg (172 lb)    BMI:  27.76 kg/m 2   BSA:  1.91 m 2            Patient PCP Information     Provider PCP Type    Neyda Meza MD General         Lab Results - 3 Days      HIV Antigen Antibody Combo [[id number]]  Resulted: 18 144, Result status: Final result    Ordering provider: Connor Cruz MD  18 0744 Resulting lab: [facility]    Specimen Information    Type Source Collected On     18 0744          Components       Value Reference Range Flag Lab   HIV Antigen Antibody Combo Nonreactive NR^Nonreactive      75   Comment:  HIV-1 p24 Ag & HIV-1/HIV-2 Ab Not Detected            Hepatitis C antibody [[id number]]  Resulted: 18 144, Result status: Final result    Ordering provider: Connor Cruz MD  18 0744 Resulting lab: [facility] B[facility]    Specimen Information    Type Source Collected On     18 0744          Components       Value Reference Range Flag Lab   Hepatitis C Antibody Nonreactive NR^Nonreactive  75   Comment:         Assay performance characteristics have not been established for newborns,   infants, and children              Testing Performed By     Lab - Abbreviation Name Director Address Valid Date Range    75 - Unknown [facility] Unknown [address] [facility] [address] 01/15/15 1019 - Present            Unresulted Labs     None      Encounter-Level " Documents:     There are no encounter-level documents.      Order-Level Documents:     There are no order-level documents.

## 2018-01-05 LAB
ALBUMIN SERPL-MCNC: 3.5 G/DL (ref 3.4–5)
ALP SERPL-CCNC: 77 U/L (ref 40–150)
ALT SERPL W P-5'-P-CCNC: 37 U/L (ref 0–50)
ANION GAP SERPL CALCULATED.3IONS-SCNC: 7 MMOL/L (ref 3–14)
AST SERPL W P-5'-P-CCNC: 38 U/L (ref 0–45)
BILIRUB SERPL-MCNC: 0.5 MG/DL (ref 0.2–1.3)
BUN SERPL-MCNC: 13 MG/DL (ref 7–30)
CALCIUM SERPL-MCNC: 8.5 MG/DL (ref 8.5–10.1)
CHLORIDE SERPL-SCNC: 105 MMOL/L (ref 94–109)
CHOLEST SERPL-MCNC: 212 MG/DL
CO2 SERPL-SCNC: 29 MMOL/L (ref 20–32)
CREAT SERPL-MCNC: 0.72 MG/DL (ref 0.52–1.04)
ERYTHROCYTE [DISTWIDTH] IN BLOOD BY AUTOMATED COUNT: 12.8 % (ref 10–15)
GFR SERPL CREATININE-BSD FRML MDRD: >90 ML/MIN/1.7M2
GLUCOSE SERPL-MCNC: 81 MG/DL (ref 70–99)
HCT VFR BLD AUTO: 40.4 % (ref 35–47)
HDLC SERPL-MCNC: 77 MG/DL
HGB BLD-MCNC: 13.5 G/DL (ref 11.7–15.7)
LDLC SERPL CALC-MCNC: 117 MG/DL
MCH RBC QN AUTO: 30.5 PG (ref 26.5–33)
MCHC RBC AUTO-ENTMCNC: 33.4 G/DL (ref 31.5–36.5)
MCV RBC AUTO: 91 FL (ref 78–100)
NONHDLC SERPL-MCNC: 135 MG/DL
PLATELET # BLD AUTO: 195 10E9/L (ref 150–450)
POTASSIUM SERPL-SCNC: 4.6 MMOL/L (ref 3.4–5.3)
PROT SERPL-MCNC: 6.8 G/DL (ref 6.8–8.8)
RBC # BLD AUTO: 4.42 10E12/L (ref 3.8–5.2)
SODIUM SERPL-SCNC: 141 MMOL/L (ref 133–144)
TRIGL SERPL-MCNC: 90 MG/DL
TSH SERPL DL<=0.005 MIU/L-ACNC: 1.46 MU/L (ref 0.4–4)
VIT B12 SERPL-MCNC: 1183 PG/ML (ref 193–986)
WBC # BLD AUTO: 3.5 10E9/L (ref 4–11)

## 2018-01-05 PROCEDURE — 99222 1ST HOSP IP/OBS MODERATE 55: CPT | Performed by: PHYSICIAN ASSISTANT

## 2018-01-05 PROCEDURE — 84443 ASSAY THYROID STIM HORMONE: CPT | Performed by: PSYCHIATRY & NEUROLOGY

## 2018-01-05 PROCEDURE — 99207 ZZC CONSULT E&M CHANGED TO INITIAL LEVEL: CPT | Performed by: PHYSICIAN ASSISTANT

## 2018-01-05 PROCEDURE — 25000125 ZZHC RX 250: Performed by: PSYCHIATRY & NEUROLOGY

## 2018-01-05 PROCEDURE — 80053 COMPREHEN METABOLIC PANEL: CPT | Performed by: PSYCHIATRY & NEUROLOGY

## 2018-01-05 PROCEDURE — 25000132 ZZH RX MED GY IP 250 OP 250 PS 637: Performed by: PSYCHIATRY & NEUROLOGY

## 2018-01-05 PROCEDURE — 85027 COMPLETE CBC AUTOMATED: CPT | Performed by: PSYCHIATRY & NEUROLOGY

## 2018-01-05 PROCEDURE — 25000132 ZZH RX MED GY IP 250 OP 250 PS 637: Performed by: PHYSICIAN ASSISTANT

## 2018-01-05 PROCEDURE — 80061 LIPID PANEL: CPT | Performed by: PSYCHIATRY & NEUROLOGY

## 2018-01-05 PROCEDURE — 87389 HIV-1 AG W/HIV-1&-2 AB AG IA: CPT | Performed by: PSYCHIATRY & NEUROLOGY

## 2018-01-05 PROCEDURE — 36415 COLL VENOUS BLD VENIPUNCTURE: CPT | Performed by: PSYCHIATRY & NEUROLOGY

## 2018-01-05 PROCEDURE — 86803 HEPATITIS C AB TEST: CPT | Performed by: PSYCHIATRY & NEUROLOGY

## 2018-01-05 PROCEDURE — 99221 1ST HOSP IP/OBS SF/LOW 40: CPT | Mod: AI | Performed by: PSYCHIATRY & NEUROLOGY

## 2018-01-05 PROCEDURE — 12800012 ZZH R&B CD MH INTERMEDIATE ADULT

## 2018-01-05 PROCEDURE — 99207 ZZC DOWN CODE DUE TO INITIAL EXAM: CPT | Performed by: PSYCHIATRY & NEUROLOGY

## 2018-01-05 PROCEDURE — 82607 VITAMIN B-12: CPT | Performed by: PSYCHIATRY & NEUROLOGY

## 2018-01-05 RX ORDER — ONDANSETRON 4 MG/1
4-8 TABLET, ORALLY DISINTEGRATING ORAL EVERY 4 HOURS PRN
Status: DISCONTINUED | OUTPATIENT
Start: 2018-01-05 | End: 2018-01-09 | Stop reason: HOSPADM

## 2018-01-05 RX ORDER — CHROMIUM PICOLINATE 200 MCG
200 TABLET ORAL DAILY
Status: DISCONTINUED | OUTPATIENT
Start: 2018-01-05 | End: 2018-01-06 | Stop reason: RX

## 2018-01-05 RX ORDER — GABAPENTIN 800 MG/1
800 TABLET ORAL 3 TIMES DAILY
Status: DISCONTINUED | OUTPATIENT
Start: 2018-01-05 | End: 2018-01-08

## 2018-01-05 RX ORDER — BUPRENORPHINE 2 MG/1
2 TABLET SUBLINGUAL 4 TIMES DAILY PRN
Status: DISCONTINUED | OUTPATIENT
Start: 2018-01-05 | End: 2018-01-05

## 2018-01-05 RX ORDER — NALOXONE HYDROCHLORIDE 0.4 MG/ML
.1-.4 INJECTION, SOLUTION INTRAMUSCULAR; INTRAVENOUS; SUBCUTANEOUS
Status: DISCONTINUED | OUTPATIENT
Start: 2018-01-05 | End: 2018-01-09 | Stop reason: HOSPADM

## 2018-01-05 RX ORDER — BUPRENORPHINE 2 MG/1
2 TABLET SUBLINGUAL 4 TIMES DAILY
Status: DISCONTINUED | OUTPATIENT
Start: 2018-01-05 | End: 2018-01-09

## 2018-01-05 RX ORDER — LOPERAMIDE HCL 2 MG
2 CAPSULE ORAL 4 TIMES DAILY PRN
Status: DISCONTINUED | OUTPATIENT
Start: 2018-01-05 | End: 2018-01-09 | Stop reason: HOSPADM

## 2018-01-05 RX ADMIN — NICOTINE POLACRILEX 8 MG: 4 GUM, CHEWING ORAL at 21:59

## 2018-01-05 RX ADMIN — OMEPRAZOLE 20 MG: 20 CAPSULE, DELAYED RELEASE ORAL at 12:40

## 2018-01-05 RX ADMIN — Medication 200 MCG: at 18:00

## 2018-01-05 RX ADMIN — HYDROXYZINE HYDROCHLORIDE 100 MG: 50 TABLET, FILM COATED ORAL at 22:00

## 2018-01-05 RX ADMIN — PROPRANOLOL HYDROCHLORIDE 40 MG: 20 TABLET ORAL at 12:40

## 2018-01-05 RX ADMIN — NICOTINE POLACRILEX 8 MG: 4 GUM, CHEWING ORAL at 18:08

## 2018-01-05 RX ADMIN — NICOTINE POLACRILEX 8 MG: 4 GUM, CHEWING ORAL at 16:27

## 2018-01-05 RX ADMIN — CLONIDINE HYDROCHLORIDE 0.1 MG: 0.1 TABLET ORAL at 08:18

## 2018-01-05 RX ADMIN — LAMOTRIGINE 150 MG: 150 TABLET ORAL at 08:17

## 2018-01-05 RX ADMIN — LAMOTRIGINE 200 MG: 100 TABLET ORAL at 22:00

## 2018-01-05 RX ADMIN — BUPRENORPHINE HCL 2 MG: 2 TABLET SUBLINGUAL at 22:00

## 2018-01-05 RX ADMIN — MELATONIN 5 MG TABLET 10 MG: at 21:59

## 2018-01-05 RX ADMIN — CLONIDINE HYDROCHLORIDE 0.1 MG: 0.1 TABLET ORAL at 16:27

## 2018-01-05 RX ADMIN — HYDROXYZINE HYDROCHLORIDE 100 MG: 50 TABLET, FILM COATED ORAL at 08:18

## 2018-01-05 RX ADMIN — BUPRENORPHINE HCL 2 MG: 2 TABLET SUBLINGUAL at 18:09

## 2018-01-05 RX ADMIN — BUPRENORPHINE HCL 2 MG: 2 TABLET SUBLINGUAL at 14:36

## 2018-01-05 RX ADMIN — ONDANSETRON 4 MG: 4 TABLET, ORALLY DISINTEGRATING ORAL at 08:20

## 2018-01-05 RX ADMIN — Medication 1000 MG: at 20:08

## 2018-01-05 RX ADMIN — MIRTAZAPINE 15 MG: 15 TABLET, FILM COATED ORAL at 22:00

## 2018-01-05 RX ADMIN — NICOTINE POLACRILEX 8 MG: 4 GUM, CHEWING ORAL at 08:18

## 2018-01-05 RX ADMIN — NICOTINE POLACRILEX 8 MG: 4 GUM, CHEWING ORAL at 14:35

## 2018-01-05 RX ADMIN — LOPERAMIDE HYDROCHLORIDE 2 MG: 2 CAPSULE ORAL at 18:08

## 2018-01-05 RX ADMIN — GABAPENTIN 800 MG: 800 TABLET, FILM COATED ORAL at 14:36

## 2018-01-05 RX ADMIN — GABAPENTIN 800 MG: 800 TABLET, FILM COATED ORAL at 20:08

## 2018-01-05 RX ADMIN — NICOTINE POLACRILEX 8 MG: 4 GUM, CHEWING ORAL at 20:08

## 2018-01-05 RX ADMIN — IBUPROFEN 600 MG: 600 TABLET ORAL at 18:08

## 2018-01-05 RX ADMIN — NICOTINE POLACRILEX 8 MG: 4 GUM, CHEWING ORAL at 12:42

## 2018-01-05 RX ADMIN — BUPRENORPHINE HCL 2 MG: 2 TABLET SUBLINGUAL at 10:51

## 2018-01-05 ASSESSMENT — ACTIVITIES OF DAILY LIVING (ADL)
GROOMING: INDEPENDENT
DRESS: INDEPENDENT
ORAL_HYGIENE: INDEPENDENT
GROOMING: INDEPENDENT

## 2018-01-05 NOTE — PROGRESS NOTES
CASE MANAGEMENT NOTE    UPDATE:  Patient reports individual weekly therapy with her established provider since 2011 which she finds very helpful. Last NA Meetings was 6 weeks ago, plans to return and reconnect with her sponsor, who was aware of her relapse. Handouts on [facility] ~ A Health Crisis Resource & Support Center, Health Realization & Mindfulness 12 step meeting lists were provided.     ORIGINAL:  Writer checked in with patient to initiate discharge planning. She plans to return home where she lives alone and return to outpatient treatment at [facility] in [place]. Patient declined further case management assistance.    Nataliya Guerra) NIKOLAI Lopez, [facility]  3A Psychotherapist  [phone]

## 2018-01-05 NOTE — PHARMACY-ADMISSION MEDICATION HISTORY
Admission Medication History status for the 1/4/2018 admission is complete.  See EPIC admission navigator for Prior to Admission medications.    Medication history sources:  Patient, [facility], Care Everywhere     Medication history source reliability: Good    Medication adherence:  Good    Changes made to PTA medication list (reason)  Added: Trintellix, gabapentin, naproxen, vitamin d3, fish oil, multivitamin, melatonin, 7 keto lean, chromium, bisacodyl, vitamin c, l-theanine, magnesium/calcium/zinc, albuterol (Pt reported taking all of these)  Deleted: None  Changed: none    Additional medication history information (including reliability of information, actions taken by pharmacist): -Pt reported that she is taking 4,000+ mg gabapentin daily  -Per Care Everywhere, pt was last prescribed gabapentin as entered in PTA med list 11/19/17    Time spent in this activity: 40min    Medication history completed by: Jean Stanford, Pharmacy Intern       Prior to Admission medications    Medication Sig Last Dose Taking? Auth Provider   TRINTELLIX 10 MG tablet Take 10 mg by mouth At Bedtime Past Week at Unknown time Yes Unknown, Entered By History   naproxen (NAPROSYN) 500 MG tablet Take 1,000 mg by mouth daily 1/3/2018 at Unknown time Yes Unknown, Entered By History   cholecalciferol (VITAMIN D3) 35954 UNITS capsule Take 10,000 Units by mouth daily 1/4/2018 at Unknown time Yes Unknown, Entered By History   gabapentin (NEURONTIN) 100 MG capsule Take 100 mg by mouth 3 times daily 1/4/2018 at 1200 Yes Unknown, Entered By History   fish oil-omega-3 fatty acids 1000 MG capsule Take 1,000 mg by mouth 4 times daily 1/4/2018 at Unknown time Yes Unknown, Entered By History   multivitamin, therapeutic (THERA-VIT) TABS tablet Take 1 tablet by mouth 2 times daily 1/4/2018 at Unknown time Yes Unknown, Entered By History   Melatonin 10 MG TABS tablet Take 10 mg by mouth At Bedtime 1/3/2018 at Unknown time Yes Unknown, Entered By  History   Misc Natural Products (7-KETO LEAN PO) Take 1 tablet by mouth daily 1/4/2018 at Unknown time Yes Unknown, Entered By History   chromium 200 MCG CAPS capsule Take 200 mcg by mouth daily 1/4/2018 at Unknown time Yes Unknown, Entered By History   bisacodyl (DULCOLAX) 5 MG EC tablet Take 10 mg by mouth At Bedtime Past Week at Unknown time Yes Unknown, Entered By History   ascorbic acid (VITAMIN C) 1000 MG TABS Take 1,000 mg by mouth daily 1/3/2018 at Unknown time Yes Unknown, Entered By History   L-Theanine 100 MG CAPS Take 1 capsule by mouth 2 times daily as needed 1/3/2018 at Unknown time Yes Unknown, Entered By History   Calcium-Magnesium-Zinc 167-83-8 MG TABS Take 1 tablet by mouth every morning 1/3/2018 at Unknown time Yes Unknown, Entered By History   albuterol (PROAIR HFA/PROVENTIL HFA/VENTOLIN HFA) 108 (90 BASE) MCG/ACT Inhaler Inhale 2 puffs into the lungs every 6 hours Past Week at Unknown time Yes Unknown, Entered By History   QUEtiapine (SEROQUEL) 100 MG tablet Take 1-2 tablets (100-200 mg) by mouth nightly as needed 1/3/2018 at Unknown time Yes Satya Mendes MD   mirtazapine (REMERON) 15 MG tablet Take 1 tablet (15 mg) by mouth At Bedtime Past Week at Unknown time Yes Satya Mendes MD   cloNIDine (CATAPRES) 0.1 MG tablet Take 1 tablet (0.1 mg) by mouth 3 times daily as needed Anxiety or insomnia 1/3/2018 at Unknown time Yes Satya Mendes MD   propranolol (INDERAL) 40 MG tablet Take 1 tablet (40 mg) by mouth 4 times daily as needed for tremor 1/4/2018 at 1200 Yes Satya Mendes MD   lamoTRIgine (LAMICTAL) 150 MG tablet Take 1 tablet (150 mg) by mouth daily along with 200 mg tab at night 1/4/2018 at Unknown time Yes Satya Mendes MD   sucralfate (CARAFATE) 1 GM tablet Take 1 g by mouth 2 times daily Past Month at Unknown time Yes Reported, Patient   phenazopyridine (PYRIDIUM) 200 MG tablet Take 1 tablet (200 mg) by mouth 3 times daily as needed for pain Past Month at Unknown time  Yes Rome Martinez MD   ondansetron (ZOFRAN-ODT) 4 MG disintegrating tablet Take 1 tablet (4 mg) by mouth every 4 hours as needed for nausea 1/4/2018 at 1200 Yes Mitchell Morales MD   ibuprofen (ADVIL,MOTRIN) 200 MG tablet Take 800 mg by mouth every 6 hours as needed  Past Week at Unknown time Yes Reported, Patient   naltrexone (DEPADE;REVIA) 50 MG tablet Take 1/4 tab daily after abstaining from all opiates for 5 days, then increase to 1/2 tab daily x 2 days, then 1 tab daily. Not started yet  Satya Mendes MD   lamoTRIgine (LAMICTAL) 200 MG tablet Take 1 tablet (200 mg) by mouth At Bedtime with 150 mg every morning 1/2/2018 at night  Satya Mendes MD

## 2018-01-05 NOTE — PLAN OF CARE
"Problem: Substance Withdrawal  Goal: Substance Withdrawal  Signs and symptoms of listed problems will be absent or manageable.   Outcome: Declining  Problem: Substance Withdrawal  Goal: Substance Withdrawal  Signs and symptoms of listed problems will be absent or manageable.   S : Yara is a single 38 yo female who comes to  seeking detox from Kratom and Neurontin.      B:  She has been using Kratom 10-15 pills a day, last use 1/4/18 at 0900. She also reports abusing Gabapentin 4000 mg daily for the past 3 weeks \"to try to get off the Kratom.\" She reports a lengthy poly sub history with numerous detox, CD TX and  admissions. She is followed by Dr. Abelino Mendes, see clinic notes for history. She currently endorses depression, but denies  SI/HI.  She reports significant ongoing gastritis, but does not regularly take her carafate.  She reports that she had a Rule 25 and was approved for [facility] and was in outpatient treatment [facility].  She reports that she can return to [facility] after detox.     A: Patient appears to be in moderate opiate withdrawal at this time, scoring an 8.    P: Will continue to monitor withdrawal, obtained orders and given home medications and available PRN comfort medications. Several of her supplements were not available in the pharmacy and/or pharmacist recommended a lower dose and these changes were made.       "
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
## 2018-01-05 NOTE — CONSULTS
HISTORY OF PRESENT ILLNESS:  Yara Edouard is a 37-year-old female with history of polysubstance abuse admitted to station 3A for Kratom and Neurontin detoxification.  Reportedly using up to 10-15 pills of Kratom daily.  Last used 9:00 a.m. on 01/04.  An Internal Medicine consultation was ordered by Dr. Liang to assess medical problems including frequent heartburn and nausea.  At this time, Yara feels much better since admission.  States her withdrawal symptoms have dramatically improved now on Suboxone.  Denies fever and chills.  Denies chest pain and shortness of breath.  Denies abdominal pain.  States she always has baseline nausea; however, it is currently at baseline.  Denies bowel and bladder concerns.      PAST MEDICAL HISTORY:   1.  Psychiatric history per Dr. Liang.   2.  Irritable bowel syndrome.   3.  History of interstitial cystitis, status post bladder hydrodistention therapies.   4.  GERD.   5.  History of gastritis.   6.  History of hepatitis A.   7.  History of MTHFR mutation.   8.  Status post cholecystectomy.   9.  Status post tonsillectomy.      ADMISSION MEDICATIONS:  Reviewed and listed in the medication reconciliation list.      ALLERGIES AND ADVERSE EFFECTS:  Ceclor, erythromycin, and Wellbutrin.      SOCIAL HISTORY:  .  No children.  Lives in [place].  Denies alcohol use.      FAMILY HISTORY:  Reviewed and noncontributory.      REVIEW OF SYSTEMS:  Ten-point review of systems negative except as stated above in history of present illness.      PHYSICAL EXAMINATION:   GENERAL:  Pleasant, nontoxic appearing woman in no acute distress.   VITAL SIGNS:  Normal.   HEENT:  Negative.   NECK:  Supple.  No cervical lymphadenopathy or thyromegaly.   LUNGS:  Clear.   CARDIOVASCULAR:  Regular rate and rhythm, no murmurs appreciated.   ABDOMEN:  Soft, nontender.   EXTREMITIES:  No edema.   SKIN:  No rash on exposed areas.   NEUROLOGIC:  Grossly nonfocal.      LABORATORY DATA:  Total  cholesterol 212, .  Otherwise CBC and comprehensive metabolic panel unremarkable.  TSH within normal limits.  Vitamin B12 level 1183.  Urine pregnancy test negative.  Urine drug screen negative.      ASSESSMENT:     1.  Kratom abuse in withdrawal per Dr. Liang.   2.  Gastroesophageal reflux disease with frequent nausea and history of gastritis, stable,  normally on daily proton pump inhibitor therapy.   3.  Mild dyslipidemia on admission lipid panel.   4.  History of interstitial cystitis, clinically quiescent at this time.      PLAN:  Will start prior to admission Prilosec 20 mg daily as well as as needed Zofran 4-8 mg q.6 hours p.r.n.  Also start p.r.n. Loperamide.  No further medical intervention indicated at this time.  The patient was instructed to follow up with her family physician in 4-6 weeks for repeat fasting lipid panel.  Currently, she is medically stable, and  I will be happy to follow up and see her again during her stay for any intercurrent medical issues.         SARANYA MUNOZ PA-C             D: 2018 13:53   T: 2018 14:28   MT: FRANDY      Name:     NACHO STOVER   MRN:      [id number]        Account:       [id number]   :      1980           Consult Date:  2018      Document: [id number]       cc: Keiko Liang MD

## 2018-01-05 NOTE — PROGRESS NOTES
01/04/18 1908   Patient Belongings   Did you bring any home meds/supplements to the hospital?  No   Patient Belongings other (see comments)   Disposition of Belongings Other (see comment)   Belongings Search Yes   Clothing Search Yes   Second Staff Rosario     STORAGE BIN:   coat, backpack, clothes with strings, cigarettes, lighter, restricted cosmetics, keys, tweezers, sunglasses, toiletries    LOCKED DRAWER 319-1:   cell phone    SECURITY:   $49, 2 necklaces, [place], [place] id, [employer] id, ins, meds  A             Admission:  I am responsible for any personal items that are not sent to the safe or pharmacy.  [facility] is not responsible for loss, theft or damage of any property in my possession.    Signature:  _________________________________ Date: _______  Time: _____                                              Staff Signature:  ____________________________ Date: ________  Time: _____      2nd Staff person, if patient is unable/unwilling to sign:    Signature: ________________________________ Date: ________  Time: _____   Discharge:  [facility] has returned all of my personal belongings:    Signature: _________________________________ Date: ________  Time: _____                                          Staff Signature:  ____________________________ Date: ________  Time: _____

## 2018-01-06 PROCEDURE — 25000132 ZZH RX MED GY IP 250 OP 250 PS 637: Performed by: PSYCHIATRY & NEUROLOGY

## 2018-01-06 PROCEDURE — 25000132 ZZH RX MED GY IP 250 OP 250 PS 637: Performed by: PHYSICIAN ASSISTANT

## 2018-01-06 PROCEDURE — 12800012 ZZH R&B CD MH INTERMEDIATE ADULT

## 2018-01-06 PROCEDURE — 25000125 ZZHC RX 250: Performed by: PHYSICIAN ASSISTANT

## 2018-01-06 RX ORDER — CHROMIUM PICOLINATE 200 MCG
200 TABLET ORAL DAILY
Status: DISCONTINUED | OUTPATIENT
Start: 2018-01-06 | End: 2018-01-09 | Stop reason: HOSPADM

## 2018-01-06 RX ADMIN — NICOTINE POLACRILEX 8 MG: 4 GUM, CHEWING ORAL at 16:27

## 2018-01-06 RX ADMIN — BUPRENORPHINE HCL 2 MG: 2 TABLET SUBLINGUAL at 08:18

## 2018-01-06 RX ADMIN — OXYCODONE HYDROCHLORIDE AND ACETAMINOPHEN 1000 MG: 500 TABLET ORAL at 08:18

## 2018-01-06 RX ADMIN — GABAPENTIN 800 MG: 800 TABLET, FILM COATED ORAL at 14:05

## 2018-01-06 RX ADMIN — NICOTINE POLACRILEX 8 MG: 4 GUM, CHEWING ORAL at 18:19

## 2018-01-06 RX ADMIN — BUPRENORPHINE HCL 2 MG: 2 TABLET SUBLINGUAL at 20:08

## 2018-01-06 RX ADMIN — Medication 200 MCG: at 12:32

## 2018-01-06 RX ADMIN — MIRTAZAPINE 15 MG: 15 TABLET, FILM COATED ORAL at 20:56

## 2018-01-06 RX ADMIN — LAMOTRIGINE 150 MG: 150 TABLET ORAL at 08:18

## 2018-01-06 RX ADMIN — Medication 1000 MG: at 20:08

## 2018-01-06 RX ADMIN — PROPRANOLOL HYDROCHLORIDE 40 MG: 20 TABLET ORAL at 14:05

## 2018-01-06 RX ADMIN — Medication 1000 MG: at 08:18

## 2018-01-06 RX ADMIN — BUPRENORPHINE HCL 2 MG: 2 TABLET SUBLINGUAL at 12:32

## 2018-01-06 RX ADMIN — NICOTINE POLACRILEX 8 MG: 4 GUM, CHEWING ORAL at 08:17

## 2018-01-06 RX ADMIN — NICOTINE POLACRILEX 8 MG: 4 GUM, CHEWING ORAL at 12:32

## 2018-01-06 RX ADMIN — HYDROXYZINE HYDROCHLORIDE 100 MG: 50 TABLET, FILM COATED ORAL at 16:27

## 2018-01-06 RX ADMIN — THERA TABS 1 TABLET: TAB at 08:18

## 2018-01-06 RX ADMIN — CLONIDINE HYDROCHLORIDE 0.1 MG: 0.1 TABLET ORAL at 11:19

## 2018-01-06 RX ADMIN — QUETIAPINE FUMARATE 100 MG: 100 TABLET, FILM COATED ORAL at 21:00

## 2018-01-06 RX ADMIN — LAMOTRIGINE 200 MG: 100 TABLET ORAL at 20:58

## 2018-01-06 RX ADMIN — NICOTINE POLACRILEX 8 MG: 4 GUM, CHEWING ORAL at 20:08

## 2018-01-06 RX ADMIN — MELATONIN 5 MG TABLET 10 MG: at 20:56

## 2018-01-06 RX ADMIN — CLONIDINE HYDROCHLORIDE 0.1 MG: 0.1 TABLET ORAL at 18:19

## 2018-01-06 RX ADMIN — GABAPENTIN 800 MG: 800 TABLET, FILM COATED ORAL at 08:18

## 2018-01-06 RX ADMIN — HYDROXYZINE HYDROCHLORIDE 100 MG: 50 TABLET, FILM COATED ORAL at 12:32

## 2018-01-06 RX ADMIN — GABAPENTIN 800 MG: 800 TABLET, FILM COATED ORAL at 20:08

## 2018-01-06 RX ADMIN — OMEPRAZOLE 20 MG: 20 CAPSULE, DELAYED RELEASE ORAL at 08:18

## 2018-01-06 RX ADMIN — BUPRENORPHINE HCL 2 MG: 2 TABLET SUBLINGUAL at 16:26

## 2018-01-06 RX ADMIN — CHOLECALCIFEROL CAP 125 MCG (5000 UNIT) 5000 UNITS: 125 CAP at 08:18

## 2018-01-06 RX ADMIN — ONDANSETRON 8 MG: 4 TABLET, ORALLY DISINTEGRATING ORAL at 16:27

## 2018-01-06 RX ADMIN — HYDROXYZINE HYDROCHLORIDE 100 MG: 50 TABLET, FILM COATED ORAL at 08:17

## 2018-01-06 ASSESSMENT — ACTIVITIES OF DAILY LIVING (ADL): GROOMING: INDEPENDENT

## 2018-01-07 PROCEDURE — 25000132 ZZH RX MED GY IP 250 OP 250 PS 637: Performed by: PSYCHIATRY & NEUROLOGY

## 2018-01-07 PROCEDURE — 25000128 H RX IP 250 OP 636: Performed by: PSYCHIATRY & NEUROLOGY

## 2018-01-07 PROCEDURE — 90686 IIV4 VACC NO PRSV 0.5 ML IM: CPT | Performed by: PSYCHIATRY & NEUROLOGY

## 2018-01-07 PROCEDURE — 12800012 ZZH R&B CD MH INTERMEDIATE ADULT

## 2018-01-07 PROCEDURE — 25000132 ZZH RX MED GY IP 250 OP 250 PS 637: Performed by: PHYSICIAN ASSISTANT

## 2018-01-07 RX ADMIN — OXYCODONE HYDROCHLORIDE AND ACETAMINOPHEN 1000 MG: 500 TABLET ORAL at 08:28

## 2018-01-07 RX ADMIN — Medication 1000 MG: at 08:28

## 2018-01-07 RX ADMIN — NICOTINE POLACRILEX 8 MG: 4 GUM, CHEWING ORAL at 21:40

## 2018-01-07 RX ADMIN — Medication 1000 MG: at 20:11

## 2018-01-07 RX ADMIN — NICOTINE POLACRILEX 8 MG: 4 GUM, CHEWING ORAL at 14:59

## 2018-01-07 RX ADMIN — BUPRENORPHINE HCL 2 MG: 2 TABLET SUBLINGUAL at 20:12

## 2018-01-07 RX ADMIN — OMEPRAZOLE 20 MG: 20 CAPSULE, DELAYED RELEASE ORAL at 08:28

## 2018-01-07 RX ADMIN — INFLUENZA A VIRUS A/MICHIGAN/45/2015 X-275 (H1N1) ANTIGEN (FORMALDEHYDE INACTIVATED), INFLUENZA A VIRUS A/HONG KONG/4801/2014 X-263B (H3N2) ANTIGEN (FORMALDEHYDE INACTIVATED), INFLUENZA B VIRUS B/PHUKET/3073/2013 ANTIGEN (FORMALDEHYDE INACTIVATED), AND INFLUENZA B VIRUS B/BRISBANE/60/2008 ANTIGEN (FORMALDEHYDE INACTIVATED) 0.5 ML: 15; 15; 15; 15 INJECTION, SUSPENSION INTRAMUSCULAR at 11:32

## 2018-01-07 RX ADMIN — CLONIDINE HYDROCHLORIDE 0.1 MG: 0.1 TABLET ORAL at 21:41

## 2018-01-07 RX ADMIN — NICOTINE POLACRILEX 8 MG: 4 GUM, CHEWING ORAL at 18:40

## 2018-01-07 RX ADMIN — CLONIDINE HYDROCHLORIDE 0.1 MG: 0.1 TABLET ORAL at 18:40

## 2018-01-07 RX ADMIN — BUPRENORPHINE HCL 2 MG: 2 TABLET SUBLINGUAL at 12:10

## 2018-01-07 RX ADMIN — CHOLECALCIFEROL CAP 125 MCG (5000 UNIT) 5000 UNITS: 125 CAP at 08:28

## 2018-01-07 RX ADMIN — QUETIAPINE FUMARATE 100 MG: 100 TABLET, FILM COATED ORAL at 21:40

## 2018-01-07 RX ADMIN — BUPRENORPHINE HCL 2 MG: 2 TABLET SUBLINGUAL at 16:04

## 2018-01-07 RX ADMIN — LAMOTRIGINE 200 MG: 100 TABLET ORAL at 21:41

## 2018-01-07 RX ADMIN — MIRTAZAPINE 15 MG: 15 TABLET, FILM COATED ORAL at 21:41

## 2018-01-07 RX ADMIN — LAMOTRIGINE 150 MG: 150 TABLET ORAL at 11:30

## 2018-01-07 RX ADMIN — GABAPENTIN 800 MG: 800 TABLET, FILM COATED ORAL at 08:28

## 2018-01-07 RX ADMIN — GABAPENTIN 800 MG: 800 TABLET, FILM COATED ORAL at 20:11

## 2018-01-07 RX ADMIN — GABAPENTIN 800 MG: 800 TABLET, FILM COATED ORAL at 14:01

## 2018-01-07 RX ADMIN — NICOTINE POLACRILEX 8 MG: 4 GUM, CHEWING ORAL at 20:11

## 2018-01-07 RX ADMIN — HYDROXYZINE HYDROCHLORIDE 100 MG: 50 TABLET, FILM COATED ORAL at 08:28

## 2018-01-07 RX ADMIN — Medication 200 MCG: at 08:28

## 2018-01-07 RX ADMIN — BUPRENORPHINE HCL 2 MG: 2 TABLET SUBLINGUAL at 08:28

## 2018-01-07 RX ADMIN — NICOTINE POLACRILEX 8 MG: 4 GUM, CHEWING ORAL at 08:26

## 2018-01-07 RX ADMIN — IBUPROFEN 600 MG: 600 TABLET ORAL at 19:24

## 2018-01-07 RX ADMIN — NICOTINE POLACRILEX 8 MG: 4 GUM, CHEWING ORAL at 16:04

## 2018-01-07 RX ADMIN — THERA TABS 1 TABLET: TAB at 08:28

## 2018-01-07 RX ADMIN — HYDROXYZINE HYDROCHLORIDE 100 MG: 50 TABLET, FILM COATED ORAL at 14:59

## 2018-01-07 RX ADMIN — NICOTINE POLACRILEX 8 MG: 4 GUM, CHEWING ORAL at 12:10

## 2018-01-07 RX ADMIN — MELATONIN 5 MG TABLET 10 MG: at 21:41

## 2018-01-07 ASSESSMENT — ACTIVITIES OF DAILY LIVING (ADL)
GROOMING: INDEPENDENT
ORAL_HYGIENE: INDEPENDENT
GROOMING: INDEPENDENT
DRESS: INDEPENDENT

## 2018-01-07 NOTE — PROGRESS NOTES
Brief Medicine Note    Chart reviewed. Full medicine consult on 1/5/18 by Sean Nath PA-C. MrKristina Nath's consult note was reviewed.     Notified by nursing that patient requesting HCV and HIV testing d/t sexual exposure risk. She denies any genitourinary symptoms.      Plan:  - HCV ab  - HIV ab    Please page medicine with abnormal results.  Will sign off.    Darinel Pack PA-C  Internal Medicine [facility] Hospitalist  Pager [phone]

## 2018-01-08 LAB
HCV AB SERPL QL IA: NONREACTIVE
HIV 1+2 AB+HIV1 P24 AG SERPL QL IA: NONREACTIVE

## 2018-01-08 PROCEDURE — 12800012 ZZH R&B CD MH INTERMEDIATE ADULT

## 2018-01-08 PROCEDURE — 25000125 ZZHC RX 250: Performed by: PHYSICIAN ASSISTANT

## 2018-01-08 PROCEDURE — 25000132 ZZH RX MED GY IP 250 OP 250 PS 637: Performed by: PHYSICIAN ASSISTANT

## 2018-01-08 PROCEDURE — 25000132 ZZH RX MED GY IP 250 OP 250 PS 637: Performed by: PSYCHIATRY & NEUROLOGY

## 2018-01-08 PROCEDURE — 99233 SBSQ HOSP IP/OBS HIGH 50: CPT | Performed by: PSYCHIATRY & NEUROLOGY

## 2018-01-08 RX ORDER — GABAPENTIN 600 MG/1
600 TABLET ORAL 3 TIMES DAILY
Status: DISCONTINUED | OUTPATIENT
Start: 2018-01-08 | End: 2018-01-09 | Stop reason: HOSPADM

## 2018-01-08 RX ORDER — QUETIAPINE FUMARATE 100 MG/1
200 TABLET, FILM COATED ORAL
Status: DISCONTINUED | OUTPATIENT
Start: 2018-01-08 | End: 2018-01-09 | Stop reason: HOSPADM

## 2018-01-08 RX ORDER — BUPRENORPHINE HYDROCHLORIDE AND NALOXONE HYDROCHLORIDE DIHYDRATE 8; 2 MG/1; MG/1
1 TABLET SUBLINGUAL DAILY
Qty: 7 EACH | Refills: 0 | Status: SHIPPED | OUTPATIENT
Start: 2018-01-08 | End: 2018-01-16

## 2018-01-08 RX ORDER — LEVOMEFOLATE CALCIUM 7.5 MG TABLET
7.5 TABLET DAILY
Status: DISCONTINUED | OUTPATIENT
Start: 2018-01-08 | End: 2018-01-09 | Stop reason: HOSPADM

## 2018-01-08 RX ORDER — CLONIDINE HYDROCHLORIDE 0.1 MG/1
0.1 TABLET ORAL 3 TIMES DAILY PRN
Status: DISCONTINUED | OUTPATIENT
Start: 2018-01-08 | End: 2018-01-09 | Stop reason: HOSPADM

## 2018-01-08 RX ADMIN — ONDANSETRON 4 MG: 4 TABLET, ORALLY DISINTEGRATING ORAL at 14:17

## 2018-01-08 RX ADMIN — MIRTAZAPINE 15 MG: 15 TABLET, FILM COATED ORAL at 21:16

## 2018-01-08 RX ADMIN — CHOLECALCIFEROL CAP 125 MCG (5000 UNIT) 5000 UNITS: 125 CAP at 08:33

## 2018-01-08 RX ADMIN — CLONIDINE HYDROCHLORIDE 0.1 MG: 0.1 TABLET ORAL at 14:15

## 2018-01-08 RX ADMIN — Medication 1000 MG: at 20:13

## 2018-01-08 RX ADMIN — BUPRENORPHINE HCL 2 MG: 2 TABLET SUBLINGUAL at 11:39

## 2018-01-08 RX ADMIN — LAMOTRIGINE 150 MG: 150 TABLET ORAL at 08:34

## 2018-01-08 RX ADMIN — GABAPENTIN 600 MG: 600 TABLET, FILM COATED ORAL at 20:13

## 2018-01-08 RX ADMIN — BUPRENORPHINE HCL 2 MG: 2 TABLET SUBLINGUAL at 17:03

## 2018-01-08 RX ADMIN — GABAPENTIN 600 MG: 600 TABLET, FILM COATED ORAL at 14:15

## 2018-01-08 RX ADMIN — PROPRANOLOL HYDROCHLORIDE 40 MG: 20 TABLET ORAL at 11:39

## 2018-01-08 RX ADMIN — HYDROXYZINE HYDROCHLORIDE 100 MG: 50 TABLET, FILM COATED ORAL at 21:16

## 2018-01-08 RX ADMIN — Medication 200 MCG: at 08:34

## 2018-01-08 RX ADMIN — IBUPROFEN 600 MG: 600 TABLET ORAL at 11:39

## 2018-01-08 RX ADMIN — NICOTINE POLACRILEX 8 MG: 4 GUM, CHEWING ORAL at 14:15

## 2018-01-08 RX ADMIN — Medication 1000 MG: at 08:33

## 2018-01-08 RX ADMIN — THERA TABS 1 TABLET: TAB at 08:33

## 2018-01-08 RX ADMIN — CLONIDINE HYDROCHLORIDE 0.1 MG: 0.1 TABLET ORAL at 20:13

## 2018-01-08 RX ADMIN — GABAPENTIN 600 MG: 600 TABLET, FILM COATED ORAL at 08:34

## 2018-01-08 RX ADMIN — NICOTINE POLACRILEX 8 MG: 4 GUM, CHEWING ORAL at 08:33

## 2018-01-08 RX ADMIN — QUETIAPINE FUMARATE 200 MG: 100 TABLET ORAL at 21:16

## 2018-01-08 RX ADMIN — LEVOMEFOLATE CALCIUM 7.5 MG: 7.5 TABLET, COATED ORAL at 09:35

## 2018-01-08 RX ADMIN — BUPRENORPHINE HCL 2 MG: 2 TABLET SUBLINGUAL at 08:33

## 2018-01-08 RX ADMIN — OXYCODONE HYDROCHLORIDE AND ACETAMINOPHEN 1000 MG: 500 TABLET ORAL at 08:33

## 2018-01-08 RX ADMIN — NICOTINE POLACRILEX 8 MG: 4 GUM, CHEWING ORAL at 11:39

## 2018-01-08 RX ADMIN — BUPRENORPHINE HCL 2 MG: 2 TABLET SUBLINGUAL at 20:13

## 2018-01-08 RX ADMIN — HYDROXYZINE HYDROCHLORIDE 100 MG: 50 TABLET, FILM COATED ORAL at 08:33

## 2018-01-08 RX ADMIN — NICOTINE POLACRILEX 8 MG: 4 GUM, CHEWING ORAL at 16:24

## 2018-01-08 RX ADMIN — OMEPRAZOLE 20 MG: 20 CAPSULE, DELAYED RELEASE ORAL at 08:33

## 2018-01-08 RX ADMIN — LAMOTRIGINE 200 MG: 100 TABLET ORAL at 21:16

## 2018-01-08 RX ADMIN — MELATONIN 5 MG TABLET 10 MG: at 21:16

## 2018-01-08 RX ADMIN — NICOTINE POLACRILEX 8 MG: 4 GUM, CHEWING ORAL at 21:16

## 2018-01-08 RX ADMIN — HYDROXYZINE HYDROCHLORIDE 100 MG: 50 TABLET, FILM COATED ORAL at 16:24

## 2018-01-08 ASSESSMENT — ACTIVITIES OF DAILY LIVING (ADL)
GROOMING: INDEPENDENT
ORAL_HYGIENE: INDEPENDENT
LAUNDRY: WITH SUPERVISION
DRESS: SCRUBS (BEHAVIORAL HEALTH)

## 2018-01-08 NOTE — PROGRESS NOTES
[facility] [place], [place]   Psychiatric Progress Note    Interim history   This is a 37 year old female with opiate use dx severe, ptsd, .Pt seen in rounds. Patient's mood is ok , irritable on and off   Energy Level:HIGH  Sleep:poor   Appetite:normal motivation interest improving    denied any Suicidal/homicidal ideation/plan intent.  deneid any psychosis  +prior suicde attempts, overdoses  + access to gun    Pt does not have active suicidal ideation plan , she wants to live , has future plans   Pt is in detox  Pt mssa/cows score are monitered  Tolerating meds and has no side effects.              Medications:     Current Facility-Administered Medications   Medication     gabapentin (NEURONTIN) tablet 600 mg     Chromium tablet 200 mcg     naloxone (NARCAN) injection 0.1-0.4 mg     omeprazole (priLOSEC) CR capsule 20 mg     loperamide (IMODIUM) capsule 2 mg     ondansetron (ZOFRAN-ODT) ODT tab 4-8 mg     buprenorphine (SUBUTEX) sublingual tablet 2 mg     albuterol (PROAIR HFA/PROVENTIL HFA/VENTOLIN HFA) Inhaler 2 puff     ascorbic acid (VITAMIN C) tablet 1,000 mg     bisacodyl (DULCOLAX) EC tablet 10 mg     cholecalciferol (vitamin D3) capsule CAPS 5,000 Units     fish oil-omega-3 fatty acids capsule 1,000 mg     ibuprofen (ADVIL/MOTRIN) tablet 600 mg     lamoTRIgine (LaMICtal) tablet 150 mg     lamoTRIgine (LaMICtal) tablet 200 mg     Melatonin tablet 10 mg     mirtazapine (REMERON) tablet 15 mg     multivitamin, therapeutic (THERA-VIT) tablet 1 tablet     propranolol (INDERAL) tablet 40 mg     QUEtiapine (SEROquel) tablet 100 mg     hydrOXYzine (ATARAX) tablet  mg     acetaminophen (TYLENOL) tablet 650 mg     alum & mag hydroxide-simethicone (MYLANTA ES/MAALOX  ES) suspension 30 mL     magnesium hydroxide (MILK OF MAGNESIA) suspension 30 mL     bisacodyl (DULCOLAX) Suppository 10 mg     nicotine polacrilex (NICORETTE) gum 4-8 mg     sucralfate (CARAFATE) tablet 1 g              "Allergies:     Allergies   Allergen Reactions     Ceclor [Cefaclor Monohydrate]      Erythromycin      Wellbutrin [Bupropion Hydrobromide]             Psychiatric Examination:   Blood pressure 115/69, pulse 65, temperature 98  F (36.7  C), resp. rate 16, height 1.676 m (5' 6\"), weight 78 kg (172 lb), SpO2 100 %.  Weight is 172 lbs 0 oz  Body mass index is 27.76 kg/(m^2).    Appearance:  awake, alert and adequately groomed  Attitude:  cooperative  Eye Contact:  good  Mood:  anxious  Affect:  appropriate and in normal range and mood congruent  Speech:  clear, coherent rate /rhythm are good  Psychomotor Behavior:  no evidence of tardive dyskinesia, dystonia, or tics and intact station, gait and muscle tone  Throught Process:  logical  Associations:  no loose associations  Thought Content:  no evidence of suicidal ideation or homicidal ideation, no evidence of psychotic thought, no auditory hallucinations present and no visual hallucinations present  Insight:  good  Judgement:  intact  Oriented to:  time, person, and place  Attention Span and Concentration:  intact  Recent and Remote Memory:  intact  Language fund of knowledge are adequate         Labs:     No results found for: NTBNPI, NTBNP  Lab Results   Component Value Date    WBC 3.5 (L) 01/05/2018    HGB 13.5 01/05/2018    HCT 40.4 01/05/2018    MCV 91 01/05/2018     01/05/2018     Lab Results   Component Value Date    TSH 1.46 01/05/2018            Severe opioid use disorder, specifically kratom   h/o Borderline personality disorder  PTSD   h/o Bipolar disorder type II   Plan   Pt wants to continue to be on suboxone for maintenance  Will increase seroquel to 200 mmg po qhs for insomnia  Will start gabapentin taper to 600 mg tid     Laboratory/Imaging: reviewed with patient   Consults: internal medicine consult reviewed  Patient will be treated in therapeutic milieu with appropriate individual and group therapies as described.      Medical diagnoses to be " "addressed this admission:    Plan:  \"LABORATORY DATA:  Total cholesterol 212, .  Otherwise CBC and comprehensive metabolic panel unremarkable.  TSH within normal limits.  Vitamin B12 level 1183.  Urine pregnancy test negative.  Urine drug screen negative.       ASSESSMENT:     1.  Kratom abuse in withdrawal per Dr. Liang.   2.  Gastroesophageal reflux disease with frequent nausea and history of gastritis, stable,  normally on daily proton pump inhibitor therapy.   3.  Mild dyslipidemia on admission lipid panel.   4.  History of interstitial cystitis, clinically quiescent at this time.       PLAN:  Will start prior to admission Prilosec 20 mg daily as well as as needed Zofran 4-8 mg q.6 hours p.r.n.  Also start p.r.n. Loperamide.  No further medical intervention indicated at this time.  The patient was instructed to follow up with her family physician in 4-6 weeks for repeat fasting lipid panel.  Currently, she is medically stable, and  I will be happy to follow up and see her again during her stay for any intercurrent medical issues. \"  Relevant psychosocial stressors: relapse prone , lacks sober support    Legal Status: voluntary    Safety Assessment:   Checks:  15 min  Precautions: withdrawal precautions  Pt has not required locked seclusion or restraints in the past 24 hours to maintain safety, please refer to RN documentation for further details.  Discussed with patient many issues of addiction,triggers, relapse, and establishing a solid recovery program.  Able to give informed consent:  YES   Discussed Risks/Benefits/Side Effects/Alternatives: YES    After discussion of the indications, risks, benefits, alternatives and consequences of no treatment, the patient elects to complete detox and do trt.  "

## 2018-01-08 NOTE — PROGRESS NOTES
CASE MANAGEMENT NOTE    Patient requested a change of disposition from outpatient treatment at [facility] to [facility] however writer unable to assess patient today. She is scheduled for a CD evaluation tomorrow at 1:15 PM in [address].     Nataliya Guerra) NIKOLAI Lopez, [facility]  3A Psychotherapist  [phone]

## 2018-01-08 NOTE — DISCHARGE INSTRUCTIONS
Behavioral Discharge Planning and Instructions  THANK YOU FOR CHOOSING THE [address]  [phone]    Summary: You were admitted to Station 3A on 9/17/2017 for detoxification from Kratom and Neurontin abuse.  A medical exam was performed that included lab work. You have met with a  and initially planned to return home and resume outpatient treatment services at [facility], individual psychotherapy and  Meetings/sponsor. However, during your detox stay, you requested placement in [facility] - a chemical use evaluation is scheduled for your day of discharge - see details below.  Please take care and make your recovery a daily priority, Yara!     Recommendation:  As noted above    Main Diagnoses:  Per Dr. Liagn;  Severe opioid use disorder, specifically kratom   h/o Borderline personality disorder  PTSD   h/o Bipolar disorder type II     Major Treatments, Procedures and Findings:  You were treated for Kratom detoxification using subutex and for neurontin detoxification using a neurontin taper. As an outpatient you will be prescribed suboxone, please take this medication as prescribed until it is gone. You had a chemical dependency assessment. You had labs drawn and those results were reviewed with you. Please take a copy of your lab work with you to your next primary care physician appointment.    Symptoms to Report:  If you experience more anxiety, confusion, sleeplessness, deep sadness or thoughts of suicide, notify your treatment team or notify your primary care physician. IF ANY OF THE SYMPTOMS YOU ARE EXPERIENCING ARE A MEDICAL EMERGENCY CALL 911 IMMEDIATELY.     Lifestyle Adjustment: Adjust your lifestyle to get enough sleep, relaxation, exercise and good nutrition. Continue to develop healthy coping skills to decrease stress and promote a sober living environment. Do not use mood altering substances including alcohol, illegal drugs or addictive medications other  than what is currently prescribed.     Follow up Appointments:  Jan 16, 2018 11:20 AM CST   Return Visit with Maine Fox MD   [facility] ([facility])    [address]  [address]  [facility] [address]   [phone]        Jan 18, 2018  1:15 PM CST   Schizophrenia Follow Up with Satya Mendes MD   Psychiatry Clinic ([facility])    [address]  [address] [facility] [address] [phone]        Chemical Use Evaluation Appointment - You are requesting placement in [facility]:  Tuesday, January 9, 2018 at 1:15 PM  [facility]  [address]  [place] [address]  Ph:  [phone]    Internal Medicine recommends that you have a repeat fasting lipid profile in 4-6 weeks.    Resources:   AA/NA and Sponsors are excellent resources for support and you can find one at any support group meeting.   SMART Recovery - self management for addiction recovery:  www.smartrecovery.org  http://www.aastpaul.org/?topic=8  http://[place].org/meetings/  http://www.[place].org/index.php/meeting-list-pdf  Pathways ~ A Health Crisis Resource & Support Center:  327.271.1198.  http://www.harmreduction.org  [facility] 921-296-1262  Support Group:  AA/NA and Sponsor/support  Crisis Intervention: 674.742.3721 or 264-916-6565 (TTY: 454.447.4832).  Call anytime for help.  National [place] on Mental Illness (www.[place].gian.org): 927.375.4638 or 271-346-7994.  Alcoholics Anonymous (www.alcoholics-anonymous.org): Check your phone book for your local chapter.  Suicide Awareness Voices of Education (SAVE) (www.save.org): 542-546-CJLT (9948)  National Suicide Prevention Line (www.mentalhealthmn.org): 832-836-GEBB (9048)  Mental Health Consumer/Survivor Network of [place] (www.mhcsn.net): 503-137-9309 or 979-305-9461  Mental Health Association of [place] (www.mentalhealth.org):  767.200.6695 or 693-299-7955   Substance Abuse and Mental Health Services (www.sama.gov)    [place] [facility] ([facility])  [facility] connects people seeking recovery to resources that help foster and sustain long-term recovery.  Whether you are seeking resources for treatment, transportation, housing, job training, education, health care or other pathways to recovery, [facility] is a great place to start.  315.279.4077.  www.[facility].org    General Medication Instructions:   See your medication sheet(s) for instructions.   Take all medicines as directed.  Make no changes unless your doctor suggests them.   Go to all your doctor visits.  Be sure to have all your required lab tests. This way, your medicines can be refilled on time.  Do not use any forms of alcohol.    Please Note:  If you have any questions at anytime after you are discharged please call the [facility] [place] detox unit 3AW unit at [phone].  [facility], Behavioral Intake [phone]  Please take this discharge folder with you to all your follow up appointments, it contains your lab results, diagnosis, medication list and discharge recommendations.      THANK YOU FOR CHOOSING THE [facility]

## 2018-01-08 NOTE — PROVIDER NOTIFICATION
"Pt quite agitated upon 1600 withdrawal assessment. Pt states, \"nothing is going right today.\" Pt requested/received hydroxyzine 100mg x 1 (See MAR).     Dr. Liang paged and notified that patient prefers to take buprenorphine at 1600 and 2000, rather than the previous scheduled 1830 and 2230. Medications times changed per Dr. Liang.     Blood pressure 101/57, pulse 60, temperature 98.1  F (36.7  C), temperature source Tympanic, resp. rate 16, height 1.676 m (5' 6\"), weight 78 kg (172 lb), SpO2 100 %.    Pt denies SI/SIB/HI. Pt denies auditory/visual hallucinations.   "

## 2018-01-08 NOTE — PROGRESS NOTES
"Pt asked for clonidine for \"my anxiety\". Informed pt that this had been discontinued, she reports \"they added that for my anxiety\" because she has been on hydroxyzine and propanolol \"for years\" and they are not effectively controlling her anxiety. Spoke with Dr. Liang and clonidine will be re-ordered at same dose/frequency as it was originally ordered. Pt informed and relieved.  "

## 2018-01-08 NOTE — PLAN OF CARE
Behavioral Team Discussion: (1/8/2018)    Continued Stay Criteria/Rationale: Patient admitted for Chemical Use Issues.  Plan: The following services will be provided to the patient; psychiatric assessment, medication management, therapeutic milieu, individual and group support, and skills groups.   Participants: 3A Provider: Dr. Keiko Liang MD; 3A RN's: Derrick Patel, RN, Gus Redmond, RN, Davon Pulliam, RN and Sally Selisker, RN; 3A CM's: Juliana Lopez and Gracie Goldberg.  Summary/Recommendation: Providers will assess today for treatment recommendations, discharge planning, and aftercare plans.   Medical/Physical: Deferred (see medical notes).  Precautions:   Behavioral Orders   Procedures     Code 1 - Restrict to Unit     Routine Programming     As clinically indicated     Status 15     Every 15 minutes.     Withdrawal precautions     Rationale for change in precautions or plan: N/A  Progress: No Change.

## 2018-01-09 ENCOUNTER — HOSPITAL ENCOUNTER (OUTPATIENT)
Dept: BEHAVIORAL HEALTH | Facility: CLINIC | Age: 38
Discharge: HOME | DRG: 897 | End: 2018-01-09
Attending: SOCIAL WORKER | Admitting: SOCIAL WORKER
Payer: COMMERCIAL

## 2018-01-09 VITALS
SYSTOLIC BLOOD PRESSURE: 108 MMHG | DIASTOLIC BLOOD PRESSURE: 77 MMHG | HEIGHT: 66 IN | TEMPERATURE: 98.2 F | WEIGHT: 178 LBS | HEART RATE: 61 BPM | BODY MASS INDEX: 28.61 KG/M2

## 2018-01-09 VITALS
BODY MASS INDEX: 27.64 KG/M2 | OXYGEN SATURATION: 100 % | HEART RATE: 67 BPM | RESPIRATION RATE: 16 BRPM | SYSTOLIC BLOOD PRESSURE: 114 MMHG | WEIGHT: 172 LBS | HEIGHT: 66 IN | TEMPERATURE: 98.5 F | DIASTOLIC BLOOD PRESSURE: 66 MMHG

## 2018-01-09 PROCEDURE — 25000132 ZZH RX MED GY IP 250 OP 250 PS 637: Performed by: PSYCHIATRY & NEUROLOGY

## 2018-01-09 PROCEDURE — 99239 HOSP IP/OBS DSCHRG MGMT >30: CPT | Performed by: PSYCHIATRY & NEUROLOGY

## 2018-01-09 PROCEDURE — 25000132 ZZH RX MED GY IP 250 OP 250 PS 637: Performed by: PHYSICIAN ASSISTANT

## 2018-01-09 RX ORDER — GABAPENTIN 600 MG/1
600 TABLET ORAL 3 TIMES DAILY
Qty: 90 TABLET | Refills: 0 | COMMUNITY
Start: 2018-01-09 | End: 2018-01-18

## 2018-01-09 RX ORDER — BUPRENORPHINE AND NALOXONE 8; 2 MG/1; MG/1
1 FILM, SOLUBLE BUCCAL; SUBLINGUAL DAILY
Status: DISCONTINUED | OUTPATIENT
Start: 2018-01-09 | End: 2018-01-09 | Stop reason: HOSPADM

## 2018-01-09 RX ORDER — CLONIDINE HYDROCHLORIDE 0.1 MG/1
0.1 TABLET ORAL 3 TIMES DAILY PRN
Qty: 60 TABLET | COMMUNITY
Start: 2018-01-09 | End: 2018-01-12

## 2018-01-09 RX ADMIN — CHOLECALCIFEROL CAP 125 MCG (5000 UNIT) 5000 UNITS: 125 CAP at 08:40

## 2018-01-09 RX ADMIN — Medication 1000 MG: at 08:40

## 2018-01-09 RX ADMIN — GABAPENTIN 600 MG: 600 TABLET, FILM COATED ORAL at 08:40

## 2018-01-09 RX ADMIN — LEVOMEFOLATE CALCIUM 7.5 MG: 7.5 TABLET, COATED ORAL at 08:40

## 2018-01-09 RX ADMIN — CLONIDINE HYDROCHLORIDE 0.1 MG: 0.1 TABLET ORAL at 08:40

## 2018-01-09 RX ADMIN — NICOTINE POLACRILEX 8 MG: 4 GUM, CHEWING ORAL at 08:39

## 2018-01-09 RX ADMIN — OXYCODONE HYDROCHLORIDE AND ACETAMINOPHEN 1000 MG: 500 TABLET ORAL at 08:42

## 2018-01-09 RX ADMIN — BUPRENORPHINE HCL 2 MG: 2 TABLET SUBLINGUAL at 08:41

## 2018-01-09 RX ADMIN — OMEPRAZOLE 20 MG: 20 CAPSULE, DELAYED RELEASE ORAL at 08:41

## 2018-01-09 RX ADMIN — LAMOTRIGINE 150 MG: 150 TABLET ORAL at 08:40

## 2018-01-09 RX ADMIN — HYDROXYZINE HYDROCHLORIDE 100 MG: 50 TABLET, FILM COATED ORAL at 08:41

## 2018-01-09 RX ADMIN — NICOTINE POLACRILEX 8 MG: 4 GUM, CHEWING ORAL at 11:08

## 2018-01-09 RX ADMIN — PROPRANOLOL HYDROCHLORIDE 40 MG: 20 TABLET ORAL at 11:08

## 2018-01-09 RX ADMIN — BUPRENORPHINE HYDROCHLORIDE, NALOXONE HYDROCHLORIDE 1 FILM: 8; 2 FILM, SOLUBLE BUCCAL; SUBLINGUAL at 08:52

## 2018-01-09 RX ADMIN — THERA TABS 1 TABLET: TAB at 08:40

## 2018-01-09 ASSESSMENT — ANXIETY QUESTIONNAIRES
4. TROUBLE RELAXING: SEVERAL DAYS
1. FEELING NERVOUS, ANXIOUS, OR ON EDGE: NEARLY EVERY DAY
GAD7 TOTAL SCORE: 12
5. BEING SO RESTLESS THAT IT IS HARD TO SIT STILL: SEVERAL DAYS
6. BECOMING EASILY ANNOYED OR IRRITABLE: NEARLY EVERY DAY
2. NOT BEING ABLE TO STOP OR CONTROL WORRYING: MORE THAN HALF THE DAYS
7. FEELING AFRAID AS IF SOMETHING AWFUL MIGHT HAPPEN: NOT AT ALL
3. WORRYING TOO MUCH ABOUT DIFFERENT THINGS: MORE THAN HALF THE DAYS

## 2018-01-09 ASSESSMENT — PAIN SCALES - GENERAL: PAINLEVEL: NO PAIN (0)

## 2018-01-09 ASSESSMENT — PATIENT HEALTH QUESTIONNAIRE - PHQ9: SUM OF ALL RESPONSES TO PHQ QUESTIONS 1-9: 19

## 2018-01-09 NOTE — PROGRESS NOTES
Pt given copy of their discharge instructions and medication administration instructions. All discharge plans and labs were discussed with patient. Pt reports no questions at this time regarding discharge plans, labs or medications. Pt denies any suicidal ideation, plans or intent at this time. Patient reports she will discharge after lunch to get to her assessment appointment scheduled today.
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
## 2018-01-09 NOTE — DISCHARGE SUMMARY
DATE OF DISCHARGE:  01/09/2018      More than 35 minutes spent on this discharge summary, doing the discharge instructions, discharge medications, discharge mental status examination.       DIAGNOSES:   Axis I:     1.  Opiate use disorder, severe.   2.  Posttraumatic stress disorder.    3.  History of bipolar affective disorder type 2.      IDENTIFYING INFORMATION:  Yara Edouard is a 37-year-old  female admitted for detox.  Please review detailed admission note by Dr. Liang on 01/05/2018.      HOSPITAL COURSE:  During the hospitalization, the patient was put back on Suboxone.  She was also abusing gabapentin and she was tapered off of gabapentin using gabapentin itself.  During the hospitalization, the patient was seen by Internal Medicine consult.  Please review the detailed note by Sean Nath on 01/05/2018.  During the hospitalization, the patient's energy, motivation, sleep and interest improved.  She did not have any suicidal or homicidal ideation, plan or intent.  The patient had a total cholesterol of 212, LDL was 117, otherwise CBC, CMP unremarkable.  TSH within normal limits.  Urine pregnancy test was negative.  She was detoxing from opiates .  During the hospitalization, the patient's energy, motivation, sleep and interest improved.  She did not have any suicidal or homicidal ideation, plan or intent.  The patient had urine pregnancy test negative.  She has hepatitis C and HIV nonreactive.  Her energy, motivation, sleep and interest improved.  She did not have any suicidal or homicidal ideation, plan or intent.  She met with the  and the plan is to do treatment at [facility].      DISCHARGE DISPOSITION:  The patient going home.      DISCHARGE FOLLOWUP:  With Dr. Fox on 01/16/2018 for Suboxone maintenance; 01/18/2018 with Satya Mendes for Psychiatry.  She is going to see at 1:15 today [facility], but she also is an outpatient program at [facility] and she plans to go back  there.      DISCHARGE MENTAL STATUS EXAMINATION:  The patient is alert, oriented x3.  Recent and remote memory, language, fund of knowledge are all adequate.  No loose associations.  The patient does not have any suicidal or homicidal ideation, plan or intent.      DISCHARGE MEDICATIONS:   1.  Suboxone 8 mg.     2.  The patient will also be on gabapentin 600 mg 3 times a day for 3 days and then to go down to 2 times a day for 2 days and then once a day and the taper will be done.    3.  She is on Catapres 0.1 mg 3 times a day as needed.     4.   folate.    5.  Nicotine gum.     6.  She will be continued on omeprazole 20 mg.    7.  Naproxen 1000 mg.    8.  Cholecalciferol.    9.  Multivitamins.    10.  She will continue on her Remeron 15 mg.   11.  Seroquel 100-200 mg as needed.   12.  Albuterol inhalers.    13.  Propranolol 40 mg 4 times a day as needed.    14.  Lamotrigine 150 mg in the morning and 200 mg at bedtime.   15.  Carafate 1 mg twice a day.        The patient does not have any suicidal or homicidal ideation, plan or intent.               DISCHARGE MENTAL STATUS EXAMINATION:  The patient is alert, oriented x3.  Good fund of knowledge.  Good use of language.  Recent and remote memory, language, fund of knowledge are all adequate.  Euthymic mood congruent affect  Speech normal rate/rhythm linear tp no loose asso,The patient does not have any active suicidal or homicidal ideation.  Does not have any auditory or visual hallucination.  Fair insight/judgment At this time, the patient was stable to be discharged.         Educated about side effects/risk vs benefits /alternative including non treatment.Pt consented to be on medication.     .Total time spent on discharge summary more than 35 min  More than  20 min  planning, coordination of care, medication reconciliation and performance of physical exam on day of discharge.Care was coordinated with unit RN and unit therapist       Yara Edouard  Medication Instructions RAMONE:[id number]    Printed on:01/12/18 1816   Medication Information                      albuterol (PROAIR HFA/PROVENTIL HFA/VENTOLIN HFA) 108 (90 BASE) MCG/ACT Inhaler  Inhale 2 puffs into the lungs every 6 hours             ascorbic acid (VITAMIN C) 1000 MG TABS  Take 1,000 mg by mouth daily             bisacodyl (DULCOLAX) 5 MG EC tablet  Take 10 mg by mouth At Bedtime             buprenorphine-naloxone (SUBOXONE) 8-2 MG SUBL sublingual tablet  Place 1 tablet under the tongue daily             Calcium-Magnesium-Zinc 167-83-8 MG TABS  Take 1 tablet by mouth every morning             cholecalciferol (VITAMIN D3) 68382 UNITS capsule  Take 10,000 Units by mouth daily             chromium 200 MCG CAPS capsule  Take 200 mcg by mouth daily             fish oil-omega-3 fatty acids 1000 MG capsule  Take 1,000 mg by mouth 4 times daily             gabapentin (NEURONTIN) 600 MG tablet  Take 1 tablet (600 mg) by mouth 3 times daily             ibuprofen (ADVIL,MOTRIN) 200 MG tablet  Take 800 mg by mouth every 6 hours as needed              L-Theanine 100 MG CAPS  Take 1 capsule by mouth 2 times daily as needed             lamoTRIgine (LAMICTAL) 150 MG tablet  Take 1 tablet (150 mg) by mouth daily along with 200 mg tab at night             lamoTRIgine (LAMICTAL) 200 MG tablet  Take 1 tablet (200 mg) by mouth At Bedtime with 150 mg every morning             Melatonin 10 MG TABS tablet  Take 10 mg by mouth At Bedtime             methylfolate 7.5 MG TABS  Take 7.5 mg by mouth daily             mirtazapine (REMERON) 15 MG tablet  Take 1 tablet (15 mg) by mouth At Bedtime             Misc Natural Products (7-KETO LEAN PO)  Take 1 tablet by mouth daily             multivitamin, therapeutic (THERA-VIT) TABS tablet  Take 1 tablet by mouth 2 times daily             naproxen (NAPROSYN) 500 MG tablet  Take 1,000 mg by mouth daily             nicotine polacrilex (NICORETTE) 4 MG gum  Place 1-2 each (4-8 mg)  inside cheek every hour as needed for smoking cessation or other (nicotine withdrawal symptoms)             omeprazole (PRILOSEC) 20 MG CR capsule  Take 1 capsule (20 mg) by mouth every morning             phenazopyridine (PYRIDIUM) 200 MG tablet  Take 1 tablet (200 mg) by mouth 3 times daily as needed for pain             propranolol (INDERAL) 40 MG tablet  Take 1 tablet (40 mg) by mouth 4 times daily as needed for tremor             QUEtiapine (SEROQUEL) 100 MG tablet  Take 1-2 tablets (100-200 mg) by mouth nightly as needed             sucralfate (CARAFATE) 1 GM tablet  Take 1 g by mouth 2 times daily                dr pavon   dr bay BARRON MD             D: 2018 09:57   T: 2018 10:35   MT: BALBIR      Name:     NACHO STOVER   MRN:      [id number]        Account:        [id number]   :      1980           Admit Date:                                       Discharge Date:       Document: [id number]

## 2018-01-10 ENCOUNTER — CARE COORDINATION (OUTPATIENT)
Dept: PSYCHIATRY | Facility: CLINIC | Age: 38
End: 2018-01-10

## 2018-01-10 DIAGNOSIS — F32.A DEPRESSION: Primary | ICD-10-CM

## 2018-01-10 ASSESSMENT — ANXIETY QUESTIONNAIRES: GAD7 TOTAL SCORE: 12

## 2018-01-10 NOTE — PROGRESS NOTES
Per Dr. Mendes:  Find out if she stopped the kratom and is still on naltrexone because she should continue that, too. OK for fluoxetine 20 mg QD

## 2018-01-10 NOTE — PROGRESS NOTES
Rec'd clarification from our pharmacy staff that [facility] requires that pt's have a 30 d/s of medication or the ability to refill it (or have someone bring it).  [facility] fills and delivers medication to [facility] as long as there are refills on file or refills available at another pharmacy which can be transferred.

## 2018-01-10 NOTE — PROGRESS NOTES
update  Received: Today       Sheri Morris Katherine J RN       Phone Number: [phone]                     The pt is the caller. After I talked to you, I told her you would call her later today, and she said that she needed to tell you the following: she is going into [facility] tomorrow, and 2 weeks ago [facility] took her off her antidepressants. She said she needs to go back on Prozac, thinks [facility] would be okay with it, and she needs a new prescription for 20 or 40mg (preferably 40mg).

## 2018-01-10 NOTE — PROGRESS NOTES
"Rcvd Discharge Summary: Yes  Mood: Pt reports anxiety has been \"through the roof.\"  Depression is worsening which pt attributes to stopping Trintellix at last clinic appt.  Reports low mood, crying spells, anhedonia and thoughts about not caring if she alive or not.  Pt denies specific plan or intent to harm herself.  Is seeking approval to go back on Prozac which she has taken in the past.      Pt will be admitted to [facility] tomorrow.  Per pt she was told to bring a 30 d/s of all her medications.  Reviewed medications pt is seeking refills for:   1.  Gabapentin 600 mg TID  2.  Clonidine 0.1 mg TID PRN (pt states she is taking 0.1-0.2 mg up to TID)  3.  Lamictal 150 mg qam and 200 mg qhs  4.  Remeron 15 mg qhs  5.  Seroquel 100-200 mg qhs (pt taking 200 mg qhs)    Pharmacy is [facility].  Of note pt states she is also taking hydroxyzine 100 mg QID.  States she as given medication during in-pt stay although writer unable to find medication listed in discharge AVS.  Pt does not need refill as she has supply of medication remaining from past prescriptions.  Will update Dr. Mendes and seek approval to refill meds at listed above.  Will also inquire about starting Prozac for depressive symptoms.     SE: None reported    D/ch Plan:  Admit Date: 1/4/18  Discharge date: 1/9/18  Next appt: 1/18/18  Referrals (therapy, daytx, homecare, ect): [facility] -admission tomorrow  Crisis Plan: Per discharge summary  Contact Numbers Reviewed: Pt has clinic number    TCM:  Direct contact made with pt within 2 business days? Yes  Pt is scheduled in clinic within 14 calendar days of discharge? Yes  Pt qualifies for TCM visit? Yes    "

## 2018-01-10 NOTE — PROGRESS NOTES
Spoke with pt:   -relayed okay to start Prozac 20 mg daily and rx sent to [facility]  -not using Kratom  -not taking Naltrexone  -is on gabapentin taper  -was on gabapentin 300 mg TID but today has taken 300 mg BID  -gabapentin not refilled at discharge as pt had supply at home  -states that the gabapentin she has at home is not from rx but rather from what she purchased online  -will update Dr. Mendes to verify if he would approve of refill and if so obtain orders as hospital discharge note is unsigned

## 2018-01-10 NOTE — PROGRESS NOTES
Pt going to [facility]  Received: Today       David Childress, Beverly STRAUSS RN       Phone Number: [phone]                     Hello,     Patient of Dr. Mendes is going in to [facility] tomorrow and she needs a 30 d/s of all of her medication. She just had them filled two weeks ago so she needs to figure out how she can get them filled again so she has a 30 d/s.     OK to leave detailed voicemail message
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
## 2018-01-11 ENCOUNTER — CARE COORDINATION (OUTPATIENT)
Dept: PSYCHIATRY | Facility: CLINIC | Age: 38
End: 2018-01-11

## 2018-01-11 DIAGNOSIS — F19.20 CHEMICAL DEPENDENCY (H): ICD-10-CM

## 2018-01-11 NOTE — PROGRESS NOTES
[facility]   Received: Today       Jhoana Marx, Beverly STRAUSS RN       Phone Number: [phone]                     Pt is not going to [facility] but still needs Clonidine filled     [phone]

## 2018-01-11 NOTE — PROGRESS NOTES
Returned call to pt but goes directly to . [facility] with request for c/b from pt to get further details regarding [facility].  Also asked that pt call back to let writer know how much clonidine she has at home.  Per med tab in-pt provider did not refill as pt had a supply at home.  Clinic number provided for c/b.  Per chart review pt cancelled admission to [facility] and declined to reschedule stating she has too much to do.  Will update Dr. Mendes.

## 2018-01-11 NOTE — PROGRESS NOTES
Pt returns call.  Relays that she could not go to [facility] as she could not get anyone to watch her dogs for the entire time and can not afford to have them boarded.  Also has a trip planned which would have been during the time she was at [facility].  Instead is attending an outpatient CD group through [facility] which meets M, T, W from 10:00 am-1:00 pm.  Pt seeking refill of Clonidine as she will need this before appt with Dr. Mendes next week.  Has 12 tablets at home.  Pharmacy is [facility].  Will notify Dr. Mendes to seek approval and clarification of how to refill:     Per discharge AVS:   Clonidine 0.1 mg TID PRN    Per pt she is taking:   Clonidine 0.1-0.2 mg up to TID

## 2018-01-12 RX ORDER — CLONIDINE HYDROCHLORIDE 0.1 MG/1
.1-.2 TABLET ORAL 3 TIMES DAILY PRN
Qty: 60 TABLET | Refills: 0 | Status: SHIPPED | OUTPATIENT
Start: 2018-01-12 | End: 2018-01-18

## 2018-01-16 ENCOUNTER — OFFICE VISIT (OUTPATIENT)
Dept: ADDICTION MEDICINE | Facility: CLINIC | Age: 38
End: 2018-01-16
Payer: COMMERCIAL

## 2018-01-16 VITALS
OXYGEN SATURATION: 99 % | WEIGHT: 181 LBS | SYSTOLIC BLOOD PRESSURE: 112 MMHG | BODY MASS INDEX: 29.21 KG/M2 | HEART RATE: 65 BPM | DIASTOLIC BLOOD PRESSURE: 62 MMHG | RESPIRATION RATE: 18 BRPM

## 2018-01-16 DIAGNOSIS — F43.10 PTSD (POST-TRAUMATIC STRESS DISORDER): ICD-10-CM

## 2018-01-16 DIAGNOSIS — F31.89 OTHER BIPOLAR DISORDER (H): ICD-10-CM

## 2018-01-16 DIAGNOSIS — M79.10 MUSCLE PAIN: ICD-10-CM

## 2018-01-16 DIAGNOSIS — N30.10 INTERSTITIAL CYSTITIS: ICD-10-CM

## 2018-01-16 DIAGNOSIS — F11.20 OPIOID USE DISORDER, SEVERE, DEPENDENCE (H): Primary | ICD-10-CM

## 2018-01-16 DIAGNOSIS — F60.3 BORDERLINE PERSONALITY DISORDER (H): ICD-10-CM

## 2018-01-16 DIAGNOSIS — K29.70 GASTRITIS WITHOUT BLEEDING, UNSPECIFIED CHRONICITY, UNSPECIFIED GASTRITIS TYPE: ICD-10-CM

## 2018-01-16 LAB
AMPHETAMINES UR QL: NOT DETECTED NG/ML
BARBITURATES UR QL SCN: NOT DETECTED NG/ML
BENZODIAZ UR QL SCN: NOT DETECTED NG/ML
BUPRENORPHINE UR QL: ABNORMAL NG/ML
CANNABINOIDS UR QL: NOT DETECTED NG/ML
COCAINE UR QL SCN: NOT DETECTED NG/ML
D-METHAMPHET UR QL: NOT DETECTED NG/ML
METHADONE UR QL SCN: NOT DETECTED NG/ML
OPIATES UR QL SCN: NOT DETECTED NG/ML
OXYCODONE UR QL SCN: NOT DETECTED NG/ML
PCP UR QL SCN: NOT DETECTED NG/ML
PROPOXYPH UR QL: NOT DETECTED NG/ML
TRICYCLICS UR QL SCN: NOT DETECTED NG/ML

## 2018-01-16 PROCEDURE — 99205 OFFICE O/P NEW HI 60 MIN: CPT | Performed by: PEDIATRICS

## 2018-01-16 PROCEDURE — 80306 DRUG TEST PRSMV INSTRMNT: CPT | Performed by: PEDIATRICS

## 2018-01-16 RX ORDER — BUPRENORPHINE HYDROCHLORIDE AND NALOXONE HYDROCHLORIDE DIHYDRATE 8; 2 MG/1; MG/1
TABLET SUBLINGUAL
Qty: 36 EACH | Refills: 0 | Status: SHIPPED | OUTPATIENT
Start: 2018-01-16 | End: 2018-01-25

## 2018-01-16 RX ORDER — BUPRENORPHINE HYDROCHLORIDE AND NALOXONE HYDROCHLORIDE DIHYDRATE 8; 2 MG/1; MG/1
1 TABLET SUBLINGUAL DAILY
Qty: 7 EACH | Refills: 0 | Status: SHIPPED | OUTPATIENT
Start: 2018-01-16 | End: 2018-01-16
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
## 2018-01-16 NOTE — MR AVS SNAPSHOT
After Visit Summary   1/16/2018    Yara Edouard    MRN: [id number]           Patient Information     Date Of Birth          1980        Visit Information        Provider Department      1/16/2018 11:20 AM Maine Fox MD [facility]        Today's Diagnoses     Opioid use disorder, severe, dependence (H)    -  1    Other bipolar disorder (H)        Borderline personality disorder        PTSD (post-traumatic stress disorder)        Muscle pain        Gastritis without bleeding, unspecified chronicity, unspecified gastritis type        Interstitial cystitis           Follow-ups after your visit        Your next 10 appointments already scheduled     Jan 18, 2018  1:15 PM CST   Schizophrenia Follow Up with Satya Mendes MD   Psychiatry Clinic ([facility])    [address]  [address] [place] [id number] [phone]            Feb 05, 2018  1:00 PM CST   [facility] Fox [facility]    [address]  [address]  [facility] [place] [id number] [phone]              Future tests that were ordered for you today     Open Standing Orders        Priority Remaining Interval Expires Ordered    Urine Drugs of Abuse Screen Panel 13 Routine 100/100  1/16/2019 1/16/2018            Who to contact     If you have questions or need follow up information about today's clinic visit or your schedule please contact [facility] directly at [phone].  Normal or non-critical lab and imaging results will be communicated to you by MyChart, letter or phone within 4 business days after the clinic has received the results. If you do not hear from us within 7 days, please contact the clinic through MyChart or phone. If you have a critical or abnormal lab result, we will notify you by phone as soon as  possible.  Submit refill requests through [email or web address] or call your pharmacy and they will forward the refill request to us. Please allow 3 business days for your refill to be completed.          Additional Information About Your Visit        [employer] Information     [email or web address] gives you secure access to your electronic health record. If you see a primary care provider, you can also send messages to your care team and make appointments. If you have questions, please call your primary care clinic.  If you do not have a primary care provider, please call [phone] and they will assist you.        Care EveryWhere ID     This is your Care EveryWhere ID. This could be used by other organizations to access your [facility] medical records  [id number]        Your Vitals Were     Pulse Respirations Pulse Oximetry BMI (Body Mass Index)          65 18 99% 29.21 kg/m2         Blood Pressure from Last 3 Encounters:   01/16/18 112/62   01/09/18 108/77   01/09/18 114/66    Weight from Last 3 Encounters:   01/16/18 181 lb (82.1 kg)   01/09/18 178 lb (80.7 kg)   01/04/18 172 lb (78 kg)                 Today's Medication Changes          These changes are accurate as of: 1/16/18 12:44 PM.  If you have any questions, ask your nurse or doctor.               Start taking these medicines.        Dose/Directions    buprenorphine-naloxone 8-2 MG Subl sublingual tablet   Commonly known as:  SUBOXONE   Started by:  Maine Fox MD        One tablet q am and 1/2 tab q pm.   Quantity:  36 each   Refills:  0            Where to get your medicines      Some of these will need a paper prescription and others can be bought over the counter.  Ask your nurse if you have questions.     Bring a paper prescription for each of these medications     buprenorphine-naloxone 8-2 MG Subl sublingual tablet                Primary Care Provider Office Phone # Fax #    Neyda Ori Meza [phone]       [facility] [address]  [facility] [address]        Equal Access to Services     RADHAMARK MITCHEL : Hadii bibi ku hadmi Soalyxali, waianda luqadaha, qaybta kachristinada jenskrys, desiree pollard ianaubrey connellmitchelltessie hurd . So [facility] [phone].    ATENCIÓN: Si habla español, tiene a galeana disposición servicios gratuitos de asistencia lingüística. Llame al [phone].    We comply with applicable federal civil rights laws and [place] laws. We do not discriminate on the basis of race, color, national origin, age, disability, sex, sexual orientation, or gender identity.            Thank you!     Thank you for choosing [facility]  for your care. Our goal is always to provide you with excellent care. Hearing back from our patients is one way we can continue to improve our services. Please take a few minutes to complete the written survey that you may receive in the mail after your visit with us. Thank you!             Your Updated Medication List - Protect others around you: Learn how to safely use, store and throw away your medicines at www.disposemymeds.org.          This list is accurate as of: 1/16/18 12:44 PM.  Always use your most recent med list.                   Brand Name Dispense Instructions for use Diagnosis    7-KETO LEAN PO      Take 1 tablet by mouth daily        albuterol 108 (90 BASE) MCG/ACT Inhaler    PROAIR HFA/PROVENTIL HFA/VENTOLIN HFA     Inhale 2 puffs into the lungs every 6 hours        ascorbic acid 1000 MG Tabs    vitamin C     Take 1,000 mg by mouth daily        buprenorphine-naloxone 8-2 MG Subl sublingual tablet    SUBOXONE    36 each    One tablet q am and 1/2 tab q pm.        Calcium-Magnesium-Zinc 167-83-8 MG Tabs      Take 1 tablet by mouth every morning        cholecalciferol 30234 UNITS capsule    vitamin D3     Take 10,000 Units by mouth daily        chromium 200 MCG Caps capsule      Take 200 mcg by mouth daily        cloNIDine 0.1 MG tablet    CATAPRES    60 tablet     Take 1-2 tablets (0.1-0.2 mg) by mouth 3 times daily as needed (anxiety)    Chemical dependency (H)       DEPLIN 7.5 MG Tabs     30 tablet    Take 7.5 mg by mouth daily        DULCOLAX 5 MG EC tablet   Generic drug:  bisacodyl      Take 10 mg by mouth At Bedtime        fish oil-omega-3 fatty acids 1000 MG capsule      Take 1,000 mg by mouth 4 times daily        FLUoxetine 20 MG capsule    PROZAC    30 capsule    Take 1 capsule (20 mg) by mouth daily    Depression       gabapentin 600 MG tablet    NEURONTIN    90 tablet    Take 1 tablet (600 mg) by mouth 3 times daily    Chemical dependency (H)       ibuprofen 200 MG tablet    ADVIL/MOTRIN     Take 800 mg by mouth every 6 hours as needed        L-Theanine 100 MG Caps      Take 1 capsule by mouth 2 times daily as needed        * lamoTRIgine 150 MG tablet    LaMICtal    30 tablet    Take 1 tablet (150 mg) by mouth daily along with 200 mg tab at night    Other bipolar disorder (H)       * lamoTRIgine 200 MG tablet    LaMICtal    30 tablet    Take 1 tablet (200 mg) by mouth At Bedtime with 150 mg every morning    PTSD (post-traumatic stress disorder)       Melatonin 10 MG Tabs tablet      Take 10 mg by mouth At Bedtime        mirtazapine 15 MG tablet    REMERON    30 tablet    Take 1 tablet (15 mg) by mouth At Bedtime    Bipolar affective disorder, currently depressed, moderate (H)       multivitamin, therapeutic Tabs tablet      Take 1 tablet by mouth 2 times daily        naproxen 500 MG tablet    NAPROSYN     Take 1,000 mg by mouth daily        nicotine polacrilex 4 MG gum    NICORETTE    270 tablet    Place 1-2 each (4-8 mg) inside cheek every hour as needed for smoking cessation or other (nicotine withdrawal symptoms)    Chemical dependency (H)       omeprazole 20 MG CR capsule    priLOSEC    30 capsule    Take 1 capsule (20 mg) by mouth every morning    Chemical dependency (H)       phenazopyridine 200 MG tablet    PYRIDIUM    6 tablet    Take 1 tablet (200 mg)  by mouth 3 times daily as needed for pain        propranolol 40 MG tablet    INDERAL    100 tablet    Take 1 tablet (40 mg) by mouth 4 times daily as needed for tremor    PTSD (post-traumatic stress disorder)       QUEtiapine 100 MG tablet    SEROquel    60 tablet    Take 1-2 tablets (100-200 mg) by mouth nightly as needed    PTSD (post-traumatic stress disorder)       sucralfate 1 GM tablet    CARAFATE     Take 1 g by mouth 2 times daily        * Notice:  This list has 2 medication(s) that are the same as other medications prescribed for you. Read the directions carefully, and ask your doctor or other care provider to review them with you.

## 2018-01-18 ENCOUNTER — OFFICE VISIT (OUTPATIENT)
Dept: PSYCHIATRY | Facility: CLINIC | Age: 38
End: 2018-01-18
Attending: PSYCHIATRY & NEUROLOGY
Payer: COMMERCIAL

## 2018-01-18 VITALS
BODY MASS INDEX: 28.76 KG/M2 | SYSTOLIC BLOOD PRESSURE: 116 MMHG | DIASTOLIC BLOOD PRESSURE: 73 MMHG | WEIGHT: 178.2 LBS | HEART RATE: 71 BPM

## 2018-01-18 DIAGNOSIS — F33.41 RECURRENT MAJOR DEPRESSIVE DISORDER, IN PARTIAL REMISSION (H): ICD-10-CM

## 2018-01-18 DIAGNOSIS — F19.20 CHEMICAL DEPENDENCY (H): ICD-10-CM

## 2018-01-18 DIAGNOSIS — F31.89 OTHER BIPOLAR DISORDER (H): ICD-10-CM

## 2018-01-18 DIAGNOSIS — F43.10 PTSD (POST-TRAUMATIC STRESS DISORDER): ICD-10-CM

## 2018-01-18 PROCEDURE — G0463 HOSPITAL OUTPT CLINIC VISIT: HCPCS | Mod: ZF

## 2018-01-18 RX ORDER — LAMOTRIGINE 150 MG/1
150 TABLET ORAL DAILY
Qty: 30 TABLET | Refills: 3 | Status: SHIPPED | OUTPATIENT
Start: 2018-01-18 | End: 2018-03-01

## 2018-01-18 RX ORDER — LAMOTRIGINE 200 MG/1
200 TABLET ORAL AT BEDTIME
Qty: 30 TABLET | Refills: 1 | Status: SHIPPED | OUTPATIENT
Start: 2018-01-18 | End: 2018-03-01

## 2018-01-18 RX ORDER — QUETIAPINE FUMARATE 100 MG/1
100-200 TABLET, FILM COATED ORAL
Qty: 60 TABLET | Refills: 1 | Status: SHIPPED | OUTPATIENT
Start: 2018-01-18 | End: 2018-03-01

## 2018-01-18 RX ORDER — CLONIDINE HYDROCHLORIDE 0.1 MG/1
.1-.2 TABLET ORAL 3 TIMES DAILY PRN
Qty: 60 TABLET | Refills: 1 | Status: SHIPPED | OUTPATIENT
Start: 2018-01-18 | End: 2018-02-22

## 2018-01-18 RX ORDER — PROPRANOLOL HYDROCHLORIDE 40 MG/1
40 TABLET ORAL 4 TIMES DAILY PRN
Qty: 100 TABLET | Refills: 3 | Status: SHIPPED | OUTPATIENT
Start: 2018-01-18 | End: 2018-03-01

## 2018-01-18 ASSESSMENT — PAIN SCALES - GENERAL: PAINLEVEL: MODERATE PAIN (5)

## 2018-01-18 NOTE — MR AVS SNAPSHOT
After Visit Summary   1/18/2018    Yara Edouard    MRN: [id number]           Patient Information     Date Of Birth          1980        Visit Information        Provider Department      1/18/2018 1:15 PM Satya Mendes MD [facility]        Today's Diagnoses     Chemical dependency (H)        Recurrent major depressive disorder, in partial remission (H)        PTSD (post-traumatic stress disorder)        Other bipolar disorder (H)          Care Instructions    stop taking Remeron           Follow-ups after your visit        Follow-up notes from your care team     Return in about 5 weeks (around 2/22/2018).      Your next 10 appointments already scheduled     Feb 05, 2018  1:00 PM CST   New Visit with Maine Fox MD   [facility] ([facility])    [address]  [address]  [facility] [id number] [phone]            Mar 01, 2018  2:15 PM CST   Schizophrenia Follow Up with Satya Mendes MD   Psychiatry Clinic ([facility])    [address]  [address] [place] [id number] [phone]              Who to contact     Please call your clinic at [phone] to:    Ask questions about your health    Make or cancel appointments    Discuss your medicines    Learn about your test results    Speak to your doctor   If you have compliments or concerns about an experience at your clinic, or if you wish to file a complaint, please contact [facility] Patient Relations at [phone] or email us at [email or web address]         Additional Information About Your Visit        MyChart Information     QuantConnect[employer] gives you secure access to your electronic health record. If you see a primary care provider, you can also send messages to your care team and make appointments. If you have questions, please call your primary care clinic.  If you do not  have a primary care provider, please call [phone] and they will assist you.      iZoca is an electronic gateway that provides easy, online access to your medical records. With iZoca, you can request a clinic appointment, read your test results, renew a prescription or communicate with your care team.     To access your existing account, please contact your [facility] or call [phone] for assistance.        Care EveryWhere ID     This is your Care EveryWhere ID. This could be used by other organizations to access your [facility] medical records  [id number]        Your Vitals Were     Pulse BMI (Body Mass Index)                71 28.76 kg/m2           Blood Pressure from Last 3 Encounters:   01/18/18 116/73   01/16/18 112/62   01/09/18 108/77    Weight from Last 3 Encounters:   01/18/18 80.8 kg (178 lb 3.2 oz)   01/16/18 82.1 kg (181 lb)   01/09/18 80.7 kg (178 lb)              Today, you had the following     No orders found for display         Today's Medication Changes          These changes are accurate as of: 1/18/18 11:59 PM.  If you have any questions, ask your nurse or doctor.               Stop taking these medicines if you haven't already. Please contact your care team if you have questions.     gabapentin 600 MG tablet   Commonly known as:  NEURONTIN   Stopped by:  Satya Mendes MD           mirtazapine 15 MG tablet   Commonly known as:  REMERON   Stopped by:  Satya Mendes MD                Where to get your medicines      These medications were sent to [facility] [address] [place] [address] [address] AT [facility] OF  [address]  [address] [facility] [address]     Phone:  [phone]     cloNIDine 0.1 MG tablet    FLUoxetine 20 MG capsule    lamoTRIgine 150 MG tablet    lamoTRIgine 200 MG tablet    propranolol 40 MG tablet    QUEtiapine 100 MG tablet                Primary Care Provider Office Phone # Fax #    [facility]  Ori Meza [phone]       [facility] [address]  [facility] [address]        Equal Access to Services     TRISTAN GRULLON : Yu bibi lu parveena Barnes, waaxda luqadaha, qaybta kaalmada channing, desiree baca laLeiacynthia acuna. So [facility] [phone].    ATENCIÓN: Si habla español, tiene a galeana disposición servicios gratuitos de asistencia lingüística. Jese al [phone].    We comply with applicable federal civil rights laws and [place] laws. We do not discriminate on the basis of race, color, national origin, age, disability, sex, sexual orientation, or gender identity.            Thank you!     Thank you for choosing PSYCHIATRY CLINIC  for your care. Our goal is always to provide you with excellent care. Hearing back from our patients is one way we can continue to improve our services. Please take a few minutes to complete the written survey that you may receive in the mail after your visit with us. Thank you!             Your Updated Medication List - Protect others around you: Learn how to safely use, store and throw away your medicines at www.disposemymeds.org.          This list is accurate as of: 1/18/18 11:59 PM.  Always use your most recent med list.                   Brand Name Dispense Instructions for use Diagnosis    7-KETO LEAN PO      Take 1 tablet by mouth daily        albuterol 108 (90 BASE) MCG/ACT Inhaler    PROAIR HFA/PROVENTIL HFA/VENTOLIN HFA     Inhale 2 puffs into the lungs every 6 hours        ascorbic acid 1000 MG Tabs    vitamin C     Take 1,000 mg by mouth daily        buprenorphine-naloxone 8-2 MG Subl sublingual tablet    SUBOXONE    36 each    One tablet q am and 1/2 tab q pm.        Calcium-Magnesium-Zinc 167-83-8 MG Tabs      Take 1 tablet by mouth every morning        cholecalciferol 38427 UNITS capsule    vitamin D3     Take 10,000 Units by mouth daily        chromium 200 MCG Caps capsule      Take 200 mcg by mouth daily         cloNIDine 0.1 MG tablet    CATAPRES    60 tablet    Take 1-2 tablets (0.1-0.2 mg) by mouth 3 times daily as needed (anxiety)    Chemical dependency (H)       DEPLIN 7.5 MG Tabs     30 tablet    Take 7.5 mg by mouth daily        DULCOLAX 5 MG EC tablet   Generic drug:  bisacodyl      Take 10 mg by mouth At Bedtime        fish oil-omega-3 fatty acids 1000 MG capsule      Take 1,000 mg by mouth 4 times daily        FLUoxetine 20 MG capsule    PROZAC    30 capsule    Take 1 capsule (20 mg) by mouth daily    Recurrent major depressive disorder, in partial remission (H)       ibuprofen 200 MG tablet    ADVIL/MOTRIN     Take 800 mg by mouth every 6 hours as needed        L-Theanine 100 MG Caps      Take 1 capsule by mouth 2 times daily as needed        * lamoTRIgine 200 MG tablet    LaMICtal    30 tablet    Take 1 tablet (200 mg) by mouth At Bedtime with 150 mg every morning    PTSD (post-traumatic stress disorder)       * lamoTRIgine 150 MG tablet    LaMICtal    30 tablet    Take 1 tablet (150 mg) by mouth daily along with 200 mg tab at night    Other bipolar disorder (H)       Melatonin 10 MG Tabs tablet      Take 10 mg by mouth At Bedtime        multivitamin, therapeutic Tabs tablet      Take 1 tablet by mouth 2 times daily        naproxen 500 MG tablet    NAPROSYN     Take 1,000 mg by mouth daily        nicotine polacrilex 4 MG gum    NICORETTE    270 tablet    Place 1-2 each (4-8 mg) inside cheek every hour as needed for smoking cessation or other (nicotine withdrawal symptoms)    Chemical dependency (H)       omeprazole 20 MG CR capsule    priLOSEC    30 capsule    Take 1 capsule (20 mg) by mouth every morning    Chemical dependency (H)       phenazopyridine 200 MG tablet    PYRIDIUM    6 tablet    Take 1 tablet (200 mg) by mouth 3 times daily as needed for pain        propranolol 40 MG tablet    INDERAL    100 tablet    Take 1 tablet (40 mg) by mouth 4 times daily as needed for tremor    PTSD (post-traumatic  stress disorder)       QUEtiapine 100 MG tablet    SEROquel    60 tablet    Take 1-2 tablets (100-200 mg) by mouth nightly as needed    PTSD (post-traumatic stress disorder)       sucralfate 1 GM tablet    CARAFATE     Take 1 g by mouth 2 times daily        * Notice:  This list has 2 medication(s) that are the same as other medications prescribed for you. Read the directions carefully, and ask your doctor or other care provider to review them with you.

## 2018-01-18 NOTE — NURSING NOTE
Chief Complaint   Patient presents with     Recheck Medication     Opioid use disorder, severe, dependence     Reviewed Allergies, Smoking Status, and Pain Level  Obtained Weight, Blood Pressure, Heart Rate
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
## 2018-01-25 ENCOUNTER — OFFICE VISIT (OUTPATIENT)
Dept: ADDICTION MEDICINE | Facility: CLINIC | Age: 38
End: 2018-01-25
Payer: COMMERCIAL

## 2018-01-25 ENCOUNTER — TELEPHONE (OUTPATIENT)
Dept: ADDICTION MEDICINE | Facility: CLINIC | Age: 38
End: 2018-01-25

## 2018-01-25 ENCOUNTER — NURSE TRIAGE (OUTPATIENT)
Dept: NURSING | Facility: CLINIC | Age: 38
End: 2018-01-25

## 2018-01-25 VITALS
OXYGEN SATURATION: 94 % | WEIGHT: 176 LBS | SYSTOLIC BLOOD PRESSURE: 102 MMHG | DIASTOLIC BLOOD PRESSURE: 64 MMHG | RESPIRATION RATE: 18 BRPM | HEART RATE: 64 BPM | BODY MASS INDEX: 28.41 KG/M2 | TEMPERATURE: 98.1 F

## 2018-01-25 DIAGNOSIS — F11.20 OPIOID USE DISORDER, SEVERE, DEPENDENCE (H): Primary | ICD-10-CM

## 2018-01-25 PROCEDURE — 99215 OFFICE O/P EST HI 40 MIN: CPT | Performed by: PEDIATRICS

## 2018-01-25 RX ORDER — BUPRENORPHINE AND NALOXONE 12; 3 MG/1; MG/1
1 FILM, SOLUBLE BUCCAL; SUBLINGUAL DAILY
Qty: 30 FILM | Refills: 0 | Status: SHIPPED | OUTPATIENT
Start: 2018-01-25 | End: 2018-01-25

## 2018-01-25 RX ORDER — BUPRENORPHINE HYDROCHLORIDE AND NALOXONE HYDROCHLORIDE DIHYDRATE 8; 2 MG/1; MG/1
TABLET SUBLINGUAL
Qty: 36 EACH | Refills: 0 | Status: CANCELLED | OUTPATIENT
Start: 2018-01-25

## 2018-01-25 RX ORDER — BUPRENORPHINE HYDROCHLORIDE AND NALOXONE HYDROCHLORIDE DIHYDRATE 8; 2 MG/1; MG/1
TABLET SUBLINGUAL
Qty: 14 EACH | Refills: 0 | Status: SHIPPED | OUTPATIENT
Start: 2018-01-25 | End: 2018-01-31

## 2018-01-25 RX ORDER — CLONAZEPAM 1 MG/1
1 TABLET ORAL 3 TIMES DAILY PRN
Qty: 21 TABLET | Refills: 0 | Status: SHIPPED | OUTPATIENT
Start: 2018-01-25 | End: 2018-04-19

## 2018-01-25 NOTE — MR AVS SNAPSHOT
After Visit Summary   1/25/2018    Yara Edouard    MRN: [id number]           Patient Information     Date Of Birth          1980        Visit Information        Provider Department      1/25/2018 2:00 PM aMine Fox MD [facility]        Today's Diagnoses     Opioid use disorder, severe, dependence (H)    -  1       Follow-ups after your visit        Your next 10 appointments already scheduled     Feb 05, 2018  1:00 PM CST   New Visit with Maine Fox MD   [facility]    [address] [facility] [address]  [address]  [facility] [address] [phone]            Mar 01, 2018  2:15 PM CST   Schizophrenia Follow Up with Satya Mendes MD   Psychiatry Clinic ([facility])    [address]  [address] [facility] [id number] [phone]              Who to contact     If you have questions or need follow up information about today's clinic visit or your schedule please contact [facility] directly at [phone].  Normal or non-critical lab and imaging results will be communicated to you by MyChart, letter or phone within 4 business days after the clinic has received the results. If you do not hear from us within 7 days, please contact the clinic through [employer] or phone. If you have a critical or abnormal lab result, we will notify you by phone as soon as possible.  Submit refill requests through [employer] or call your pharmacy and they will forward the refill request to us. Please allow 3 business days for your refill to be completed.          Additional Information About Your Visit        MyChart Information     [employer] gives you secure access to your electronic health record. If you see a primary care provider, you can also send messages to your care team and make appointments. If you have questions, please call  your primary care clinic.  If you do not have a primary care provider, please call [phone] and they will assist you.        Care EveryWhere ID     This is your Care EveryWhere ID. This could be used by other organizations to access your [place] medical records  [id number]        Your Vitals Were     Pulse Temperature Respirations Pulse Oximetry BMI (Body Mass Index)       64 98.1  F (36.7  C) (Oral) 18 94% 28.41 kg/m2        Blood Pressure from Last 3 Encounters:   01/25/18 102/64   01/18/18 116/73   01/16/18 112/62    Weight from Last 3 Encounters:   01/25/18 176 lb (79.8 kg)   01/18/18 178 lb 3.2 oz (80.8 kg)   01/16/18 181 lb (82.1 kg)              Today, you had the following     No orders found for display         Today's Medication Changes          These changes are accurate as of 1/25/18 11:59 PM.  If you have any questions, ask your nurse or doctor.               Start taking these medicines.        Dose/Directions    clonazePAM 1 MG tablet   Commonly known as:  klonoPIN   Used for:  Opioid use disorder, severe, dependence (H)   Started by:  Maine Fox MD        Dose:  1 mg   Take 1 tablet (1 mg) by mouth 3 times daily as needed for anxiety   Quantity:  21 tablet   Refills:  0         These medicines have changed or have updated prescriptions.        Dose/Directions    buprenorphine-naloxone 8-2 MG Subl sublingual tablet   Commonly known as:  SUBOXONE   This may have changed:  additional instructions   Used for:  Opioid use disorder, severe, dependence (H)   Changed by:  Maine Fox MD        One tablet q am and 1 tab q pm.   Quantity:  14 each   Refills:  0            Where to get your medicines      Some of these will need a paper prescription and others can be bought over the counter.  Ask your nurse if you have questions.     Bring a paper prescription for each of these medications     buprenorphine-naloxone 8-2 MG Subl sublingual tablet    clonazePAM 1 MG tablet                 Primary Care Provider Office Phone # Fax #    Neyda Ori Meza [phone]       [facility] [address]  [facility] [address]        Equal Access to Services     TRISTAN GRULLON : Hadii bibi lu cammyo Soomaali, waaxda luqadaha, qaybta kaalmada adeayeshayada, desiree randin hayaaaubrey colindresayesha baca vickey acuna. So [facility] [phone].    ATENCIÓN: Si habla español, tiene a galeana disposición servicios gratuitos de asistencia lingüística. [facility] [phone].    We comply with applicable federal civil rights laws and [place] laws. We do not discriminate on the basis of race, color, national origin, age, disability, sex, sexual orientation, or gender identity.            Thank you!     Thank you for choosing [facility] PRIMARY CARE  for your care. Our goal is always to provide you with excellent care. Hearing back from our patients is one way we can continue to improve our services. Please take a few minutes to complete the written survey that you may receive in the mail after your visit with us. Thank you!             Your Updated Medication List - Protect others around you: Learn how to safely use, store and throw away your medicines at www.disposemymeds.org.          This list is accurate as of 1/25/18 11:59 PM.  Always use your most recent med list.                   Brand Name Dispense Instructions for use Diagnosis    7-KETO LEAN PO      Take 1 tablet by mouth daily        albuterol 108 (90 BASE) MCG/ACT Inhaler    PROAIR HFA/PROVENTIL HFA/VENTOLIN HFA     Inhale 2 puffs into the lungs every 6 hours        ascorbic acid 1000 MG Tabs    vitamin C     Take 1,000 mg by mouth daily        buprenorphine-naloxone 8-2 MG Subl sublingual tablet    SUBOXONE    14 each    One tablet q am and 1 tab q pm.    Opioid use disorder, severe, dependence (H)       Calcium-Magnesium-Zinc 167-83-8 MG Tabs      Take 1 tablet by mouth every morning        cholecalciferol 02948 UNITS  capsule    vitamin D3     Take 10,000 Units by mouth daily        chromium 200 MCG Caps capsule      Take 200 mcg by mouth daily        clonazePAM 1 MG tablet    klonoPIN    21 tablet    Take 1 tablet (1 mg) by mouth 3 times daily as needed for anxiety    Opioid use disorder, severe, dependence (H)       cloNIDine 0.1 MG tablet    CATAPRES    60 tablet    Take 1-2 tablets (0.1-0.2 mg) by mouth 3 times daily as needed (anxiety)    Chemical dependency (H)       DEPLIN 7.5 MG Tabs     30 tablet    Take 7.5 mg by mouth daily        DULCOLAX 5 MG EC tablet   Generic drug:  bisacodyl      Take 10 mg by mouth At Bedtime        fish oil-omega-3 fatty acids 1000 MG capsule      Take 1,000 mg by mouth 4 times daily        FLUoxetine 20 MG capsule    PROZAC    30 capsule    Take 1 capsule (20 mg) by mouth daily    Recurrent major depressive disorder, in partial remission (H)       ibuprofen 200 MG tablet    ADVIL/MOTRIN     Take 800 mg by mouth every 6 hours as needed        L-Theanine 100 MG Caps      Take 1 capsule by mouth 2 times daily as needed        * lamoTRIgine 200 MG tablet    LaMICtal    30 tablet    Take 1 tablet (200 mg) by mouth At Bedtime with 150 mg every morning    PTSD (post-traumatic stress disorder)       * lamoTRIgine 150 MG tablet    LaMICtal    30 tablet    Take 1 tablet (150 mg) by mouth daily along with 200 mg tab at night    Other bipolar disorder (H)       Melatonin 10 MG Tabs tablet      Take 10 mg by mouth At Bedtime        multivitamin, therapeutic Tabs tablet      Take 1 tablet by mouth 2 times daily        naproxen 500 MG tablet    NAPROSYN     Take 1,000 mg by mouth daily        nicotine polacrilex 4 MG gum    NICORETTE    270 tablet    Place 1-2 each (4-8 mg) inside cheek every hour as needed for smoking cessation or other (nicotine withdrawal symptoms)    Chemical dependency (H)       omeprazole 20 MG CR capsule    priLOSEC    30 capsule    Take 1 capsule (20 mg) by mouth every morning     Chemical dependency (H)       phenazopyridine 200 MG tablet    PYRIDIUM    6 tablet    Take 1 tablet (200 mg) by mouth 3 times daily as needed for pain        propranolol 40 MG tablet    INDERAL    100 tablet    Take 1 tablet (40 mg) by mouth 4 times daily as needed for tremor    PTSD (post-traumatic stress disorder)       QUEtiapine 100 MG tablet    SEROquel    60 tablet    Take 1-2 tablets (100-200 mg) by mouth nightly as needed    PTSD (post-traumatic stress disorder)       sucralfate 1 GM tablet    CARAFATE     Take 1 g by mouth 2 times daily        * Notice:  This list has 2 medication(s) that are the same as other medications prescribed for you. Read the directions carefully, and ask your doctor or other care provider to review them with you.

## 2018-01-25 NOTE — TELEPHONE ENCOUNTER
Incoming call from pt she's not able to  her Subx today, pt will be able to  on 1/31. Pt's requesting a call back from Dr. Fox today. Pt also stated that Dr. Fox knows exactly what's going on.   Pt contact info: [phone]      Dany Mckeon

## 2018-01-26 NOTE — TELEPHONE ENCOUNTER
Unable to leave a voice message for patient due to full mailbox.  Terrie Gallego RN  [facility]

## 2018-01-26 NOTE — TELEPHONE ENCOUNTER
----- Message from Lizzy Acosta sent at 1/25/2018  6:39 PM CST -----  Reason for call:  Medication   If this is a refill request, has the caller requested the refill from the pharmacy already? No  Will the patient be using a [facility]? No  Name of the pharmacy and phone number for the current request: [facility] [phone]    Name of the medication requested: Clonazepam    Other request: No.    Phone number to reach patient:  Home number on file [phone] (home)    Best Time:  Anytime    Can we leave a detailed message on this number?  YES

## 2018-02-05 ENCOUNTER — OFFICE VISIT (OUTPATIENT)
Dept: ADDICTION MEDICINE | Facility: CLINIC | Age: 38
End: 2018-02-05
Payer: COMMERCIAL

## 2018-02-05 ENCOUNTER — TELEPHONE (OUTPATIENT)
Dept: ADDICTION MEDICINE | Facility: CLINIC | Age: 38
End: 2018-02-05

## 2018-02-05 VITALS
SYSTOLIC BLOOD PRESSURE: 122 MMHG | WEIGHT: 177.5 LBS | RESPIRATION RATE: 18 BRPM | DIASTOLIC BLOOD PRESSURE: 64 MMHG | HEART RATE: 61 BPM | BODY MASS INDEX: 28.65 KG/M2 | OXYGEN SATURATION: 99 %

## 2018-02-05 DIAGNOSIS — M79.10 MUSCLE PAIN: ICD-10-CM

## 2018-02-05 DIAGNOSIS — N30.10 INTERSTITIAL CYSTITIS: ICD-10-CM

## 2018-02-05 DIAGNOSIS — F60.3 BORDERLINE PERSONALITY DISORDER (H): ICD-10-CM

## 2018-02-05 DIAGNOSIS — F31.89 OTHER BIPOLAR DISORDER (H): ICD-10-CM

## 2018-02-05 DIAGNOSIS — F43.10 PTSD (POST-TRAUMATIC STRESS DISORDER): ICD-10-CM

## 2018-02-05 DIAGNOSIS — F11.20 OPIOID USE DISORDER, SEVERE, DEPENDENCE (H): Primary | ICD-10-CM

## 2018-02-05 DIAGNOSIS — K29.70 GASTRITIS WITHOUT BLEEDING, UNSPECIFIED CHRONICITY, UNSPECIFIED GASTRITIS TYPE: ICD-10-CM

## 2018-02-05 PROCEDURE — 80306 DRUG TEST PRSMV INSTRMNT: CPT | Performed by: PEDIATRICS

## 2018-02-05 PROCEDURE — 99215 OFFICE O/P EST HI 40 MIN: CPT | Performed by: PEDIATRICS

## 2018-02-05 RX ORDER — BUPRENORPHINE HYDROCHLORIDE AND NALOXONE HYDROCHLORIDE DIHYDRATE 8; 2 MG/1; MG/1
TABLET SUBLINGUAL
Qty: 42 EACH | Refills: 0 | Status: SHIPPED | OUTPATIENT
Start: 2018-02-05 | End: 2018-02-12

## 2018-02-05 RX ORDER — GABAPENTIN 300 MG/1
300 CAPSULE ORAL 2 TIMES DAILY PRN
Qty: 20 CAPSULE | Refills: 0 | Status: SHIPPED | OUTPATIENT
Start: 2018-02-05 | End: 2018-03-01

## 2018-02-05 NOTE — MR AVS SNAPSHOT
After Visit Summary   2/5/2018    Yara Edouard    MRN: [id number]           Patient Information     Date Of Birth          1980        Visit Information        Provider Department      2/5/2018 1:00 PM Maine Fox MD [facility]        Today's Diagnoses     Opioid use disorder, severe, dependence (H)    -  1    Other bipolar disorder (H)        PTSD (post-traumatic stress disorder)        Borderline personality disorder        Interstitial cystitis        Muscle pain        Gastritis without bleeding, unspecified chronicity, unspecified gastritis type           Follow-ups after your visit        Your next 10 appointments already scheduled     Feb 26, 2018  1:00 PM CST   Return Visit with Maine Fox MD   [facility]    [address] [place] [address]  [address]  [facility] [id number] [phone]            Mar 01, 2018  2:15 PM CST   Schizophrenia Follow Up with Satya Mendes MD   Psychiatry Clinic ([facility])    [address]  [address] [facility] [address] [phone]              Who to contact     If you have questions or need follow up information about today's clinic visit or your schedule please contact [facility] directly at [phone].  Normal or non-critical lab and imaging results will be communicated to you by MyChart, letter or phone within 4 business days after the clinic has received the results. If you do not hear from us within 7 days, please contact the clinic through MyChart or phone. If you have a critical or abnormal lab result, we will notify you by phone as soon as possible.  Submit refill requests through [employer] or call your pharmacy and they will forward the refill request to us. Please allow 3 business days for your refill to be completed.          Additional Information  About Your Visit        [employer] Information     [employer] gives you secure access to your electronic health record. If you see a primary care provider, you can also send messages to your care team and make appointments. If you have questions, please call your primary care clinic.  If you do not have a primary care provider, please call [phone] and they will assist you.        Care EveryWhere ID     This is your Care EveryWhere ID. This could be used by other organizations to access your [place] medical records  [id number]        Your Vitals Were     Pulse Respirations Pulse Oximetry BMI (Body Mass Index)          61 18 99% 28.65 kg/m2         Blood Pressure from Last 3 Encounters:   02/05/18 122/64   01/25/18 102/64   01/18/18 116/73    Weight from Last 3 Encounters:   02/05/18 177 lb 8 oz (80.5 kg)   01/25/18 176 lb (79.8 kg)   01/18/18 178 lb 3.2 oz (80.8 kg)              We Performed the Following     Drug Abuse Screen Panel 13, Urine (Pain Care Package)          Today's Medication Changes          These changes are accurate as of 2/5/18 11:59 PM.  If you have any questions, ask your nurse or doctor.               Start taking these medicines.        Dose/Directions    gabapentin 300 MG capsule   Commonly known as:  NEURONTIN   Used for:  Opioid use disorder, severe, dependence (H)   Started by:  Maine Fox MD        Dose:  300 mg   Take 1 capsule (300 mg) by mouth 2 times daily as needed   Quantity:  20 capsule   Refills:  0         These medicines have changed or have updated prescriptions.        Dose/Directions    buprenorphine-naloxone 8-2 MG Subl sublingual tablet   Commonly known as:  SUBOXONE   This may have changed:  additional instructions   Used for:  Opioid use disorder, severe, dependence (H)   Changed by:  Maine Fox MD        One tablet twice daily   Quantity:  42 each   Refills:  0            Where to get your medicines      These medications were sent to [facility]  [facility] [address] - [address] & [facility]  [address] [place] [address]     Phone:  [phone]     gabapentin 300 MG capsule         Some of these will need a paper prescription and others can be bought over the counter.  Ask your nurse if you have questions.     Bring a paper prescription for each of these medications     buprenorphine-naloxone 8-2 MG Subl sublingual tablet                Primary Care Provider Office Phone # Fax #    Neyda Ori Meza [phone]       [facility] [address]  [facility] [address]        Equal Access to Services     MARK GRULLON : Hadii aad ku hadasho Soomaali, waaxda luqadaha, qaybta kaalmada adeegyada, desiree hurd . So [facility] [phone].    ATENCIÓN: Si habla español, tiene a galeana disposición servicios gratuitos de asistencia lingüística. [facility] [phone].    We comply with applicable federal civil rights laws and [place] laws. We do not discriminate on the basis of race, color, national origin, age, disability, sex, sexual orientation, or gender identity.            Thank you!     Thank you for choosing [facility] PRIMARY CARE  for your care. Our goal is always to provide you with excellent care. Hearing back from our patients is one way we can continue to improve our services. Please take a few minutes to complete the written survey that you may receive in the mail after your visit with us. Thank you!             Your Updated Medication List - Protect others around you: Learn how to safely use, store and throw away your medicines at www.disposemymeds.org.          This list is accurate as of 2/5/18 11:59 PM.  Always use your most recent med list.                   Brand Name Dispense Instructions for use Diagnosis    7-KETO LEAN PO      Take 1 tablet by mouth daily        albuterol 108 (90 BASE) MCG/ACT Inhaler    PROAIR  HFA/PROVENTIL HFA/VENTOLIN HFA     Inhale 2 puffs into the lungs every 6 hours        ascorbic acid 1000 MG Tabs    vitamin C     Take 1,000 mg by mouth daily        buprenorphine-naloxone 8-2 MG Subl sublingual tablet    SUBOXONE    42 each    One tablet twice daily    Opioid use disorder, severe, dependence (H)       Calcium-Magnesium-Zinc 167-83-8 MG Tabs      Take 1 tablet by mouth every morning        cholecalciferol 53796 UNITS capsule    vitamin D3     Take 10,000 Units by mouth daily        chromium 200 MCG Caps capsule      Take 200 mcg by mouth daily        clonazePAM 1 MG tablet    klonoPIN    21 tablet    Take 1 tablet (1 mg) by mouth 3 times daily as needed for anxiety    Opioid use disorder, severe, dependence (H)       cloNIDine 0.1 MG tablet    CATAPRES    60 tablet    Take 1-2 tablets (0.1-0.2 mg) by mouth 3 times daily as needed (anxiety)    Chemical dependency (H)       DEPLIN 7.5 MG Tabs     30 tablet    Take 7.5 mg by mouth daily        DULCOLAX 5 MG EC tablet   Generic drug:  bisacodyl      Take 10 mg by mouth At Bedtime        fish oil-omega-3 fatty acids 1000 MG capsule      Take 1,000 mg by mouth 4 times daily        FLUoxetine 20 MG capsule    PROZAC    30 capsule    Take 1 capsule (20 mg) by mouth daily    Recurrent major depressive disorder, in partial remission (H)       gabapentin 300 MG capsule    NEURONTIN    20 capsule    Take 1 capsule (300 mg) by mouth 2 times daily as needed    Opioid use disorder, severe, dependence (H)       ibuprofen 200 MG tablet    ADVIL/MOTRIN     Take 800 mg by mouth every 6 hours as needed        L-Theanine 100 MG Caps      Take 1 capsule by mouth 2 times daily as needed        * lamoTRIgine 200 MG tablet    LaMICtal    30 tablet    Take 1 tablet (200 mg) by mouth At Bedtime with 150 mg every morning    PTSD (post-traumatic stress disorder)       * lamoTRIgine 150 MG tablet    LaMICtal    30 tablet    Take 1 tablet (150 mg) by mouth daily along with 200 mg  tab at night    Other bipolar disorder (H)       Melatonin 10 MG Tabs tablet      Take 10 mg by mouth At Bedtime        multivitamin, therapeutic Tabs tablet      Take 1 tablet by mouth 2 times daily        naproxen 500 MG tablet    NAPROSYN     Take 1,000 mg by mouth daily        nicotine polacrilex 4 MG gum    NICORETTE    270 tablet    Place 1-2 each (4-8 mg) inside cheek every hour as needed for smoking cessation or other (nicotine withdrawal symptoms)    Chemical dependency (H)       omeprazole 20 MG CR capsule    priLOSEC    30 capsule    Take 1 capsule (20 mg) by mouth every morning    Chemical dependency (H)       phenazopyridine 200 MG tablet    PYRIDIUM    6 tablet    Take 1 tablet (200 mg) by mouth 3 times daily as needed for pain        propranolol 40 MG tablet    INDERAL    100 tablet    Take 1 tablet (40 mg) by mouth 4 times daily as needed for tremor    PTSD (post-traumatic stress disorder)       QUEtiapine 100 MG tablet    SEROquel    60 tablet    Take 1-2 tablets (100-200 mg) by mouth nightly as needed    PTSD (post-traumatic stress disorder)       sucralfate 1 GM tablet    CARAFATE     Take 1 g by mouth 2 times daily        * Notice:  This list has 2 medication(s) that are the same as other medications prescribed for you. Read the directions carefully, and ask your doctor or other care provider to review them with you.

## 2018-02-05 NOTE — TELEPHONE ENCOUNTER
Yenni from [facility] calling regarding suboxone prescription.  Is it ok to dispense #45 tablets instead of #42 because the medication comes in packs of #15 and they do not like to break up packs?    After provider response, RN please call [facility] at [phone]    Routing to Dr. Fox

## 2018-02-05 NOTE — PROGRESS NOTES
SUBJECTIVE:                                                    OPIOID USE DISORDER - BUPRENORPHINE FOLLOW UP:    Yara Edouard is a 37 year old female who presents to clinic today for follow up of  MAT for opioid use disorder.     Date of last visit:  1/31/2018  Bridge #15  1/25    [place] Board of Pharmacy Data Base Reviewed:    YES;     1/15         HPI:  Was able to fill med after 3 days after last visit.  Did get short dose of Klonipin for that time and did not overuse (has hx of misuse).  Since then has been taking increase dose of 8mg bid. Mood has been up and down.  Leaving [place] for [place] for 15 days for snowboard trip.  Has gone to meetings there in past.  Psychiatry after vacation is scheduled.   Hx of gabapentin misuse in past when trying to withdrawal from opioids.  Psychiatry is trying to wean this off. Has been taking 600mg once to twice /day more recently.  Worried about withdrawal from Gabapentin/ not having during trip.   (has 3 tab left and then was supposed to be done).  Feels it is helpful with anxiety.  Cravings better controlled with dose of 8mg bid.   Still going to meetings.     Status since last visit: Since last visit patient has been:stable    Intensity:     There has been: mild intermittent craving    Suboxone Dose: adequate    Progression of Symptoms/Precipitating Factors:     Cues to use and relapse triggers: Anxiety    Trigger have been:  mild    Accompanying Signs & Symptoms:    Side Effects: none    Sobriety:     Status: No substance use     Drug Screen: obtained    Alleviating factors/Other Therapies Tried:    Contact with sponsor has been: helpful    Family and support system has been: neutral    Patient has been going to recovery meetings: regularly    Recovery program has been : active    Patient has been participating in professional counseling /therapy: YES        Social History     Social History Narrative    Living on own with two dogs (Zeny and Oscar).  In school  (leave of absence).  [employer] for [employer], [employer]).         Patient Active Problem List    Diagnosis Date Noted     Interstitial cystitis 01/16/2018     Priority: Medium     Muscle pain 01/16/2018     Priority: Medium     Diagnosed with musculoskelatal disorder NOS  (hx of episodes of rhabdomyolysis).         Gastritis 01/16/2018     Priority: Medium     Hx of nausea , abd pain.  Follow up endoscopsy.          PTSD (post-traumatic stress disorder) 10/15/2014     Priority: Medium     Borderline personality disorder 10/15/2014     Priority: Medium     Other bipolar disorder (H) 11/19/2013     Priority: Medium     Diagnosis updated by automated process. Provider to review and confirm.       Chemical dependency (H) 11/03/2013     Priority: Medium     Drug overdose 10/30/2013     Priority: Medium       Problem list and histories reviewed & adjusted, as indicated.  Additional history: as documented        Current Outpatient Prescriptions on File Prior to Visit:  buprenorphine-naloxone (SUBOXONE) 8-2 MG SUBL sublingual tablet One tablet q am and 1 tab q pm.   clonazePAM (KLONOPIN) 1 MG tablet Take 1 tablet (1 mg) by mouth 3 times daily as needed for anxiety   cloNIDine (CATAPRES) 0.1 MG tablet Take 1-2 tablets (0.1-0.2 mg) by mouth 3 times daily as needed (anxiety)   FLUoxetine (PROZAC) 20 MG capsule Take 1 capsule (20 mg) by mouth daily   QUEtiapine (SEROQUEL) 100 MG tablet Take 1-2 tablets (100-200 mg) by mouth nightly as needed   lamoTRIgine (LAMICTAL) 200 MG tablet Take 1 tablet (200 mg) by mouth At Bedtime with 150 mg every morning   lamoTRIgine (LAMICTAL) 150 MG tablet Take 1 tablet (150 mg) by mouth daily along with 200 mg tab at night   propranolol (INDERAL) 40 MG tablet Take 1 tablet (40 mg) by mouth 4 times daily as needed for tremor   methylfolate 7.5 MG TABS Take 7.5 mg by mouth daily   nicotine polacrilex (NICORETTE) 4 MG gum Place 1-2 each (4-8 mg) inside cheek every hour as needed for smoking cessation or other  (nicotine withdrawal symptoms)   omeprazole (PRILOSEC) 20 MG CR capsule Take 1 capsule (20 mg) by mouth every morning   naproxen (NAPROSYN) 500 MG tablet Take 1,000 mg by mouth daily   cholecalciferol (VITAMIN D3) 25723 UNITS capsule Take 10,000 Units by mouth daily   fish oil-omega-3 fatty acids 1000 MG capsule Take 1,000 mg by mouth 4 times daily   multivitamin, therapeutic (THERA-VIT) TABS tablet Take 1 tablet by mouth 2 times daily   Melatonin 10 MG TABS tablet Take 10 mg by mouth At Bedtime   Misc Natural Products (7-KETO LEAN PO) Take 1 tablet by mouth daily   chromium 200 MCG CAPS capsule Take 200 mcg by mouth daily   bisacodyl (DULCOLAX) 5 MG EC tablet Take 10 mg by mouth At Bedtime   ascorbic acid (VITAMIN C) 1000 MG TABS Take 1,000 mg by mouth daily   L-Theanine 100 MG CAPS Take 1 capsule by mouth 2 times daily as needed   Calcium-Magnesium-Zinc 167-83-8 MG TABS Take 1 tablet by mouth every morning   albuterol (PROAIR HFA/PROVENTIL HFA/VENTOLIN HFA) 108 (90 BASE) MCG/ACT Inhaler Inhale 2 puffs into the lungs every 6 hours   sucralfate (CARAFATE) 1 GM tablet Take 1 g by mouth 2 times daily   phenazopyridine (PYRIDIUM) 200 MG tablet Take 1 tablet (200 mg) by mouth 3 times daily as needed for pain   ibuprofen (ADVIL,MOTRIN) 200 MG tablet Take 800 mg by mouth every 6 hours as needed      No current facility-administered medications on file prior to visit.        Allergies   Allergen Reactions     Ceclor [Cefaclor Monohydrate]      Erythromycin      Wellbutrin [Bupropion Hydrobromide]          REVIEW OF SYSTEMS:  General: No acute withdrawal symptoms.  No recent infections or fever  Resp: No coughing, wheezing or shortness of breath  CV: No chest pains or palpitations  GI: No nausea, vomiting, abdominal pain, diarrhea, constipation  : No urinary frequency or dysuria,     Musculoskeletal: No significant muscle or joint pains, No edema  Neurologic: No numbness, tingling, weakness, problems with balance or  coordination  Psychiatric: No acute concerns  Skin: No rashes    OBJECTIVE:    PHYSICAL EXAM:  There were no vitals taken for this visit.    GENERAL APPEARANCE:  alert, comfortable appearing  EYES:Eyes grossly normal to inspection  NEURO:  Gait normal.  No tremor. Coordination intact.   MENTAL STATUS EXAM:  Appearance/Behavior: No appearant distress  Speech: Normal  Mood/Affect: normal affect  Insight: Adequate      Results for orders placed or performed in visit on 01/16/18   Drug Abuse Screen Panel 13, Urine (Pain Care Package)   Result Value Ref Range    Cannabinoids (13-kal-2-carboxy-9-THC) Not Detected NDET^Not Detected ng/mL    Phencyclidine (Phencyclidine) Not Detected NDET^Not Detected ng/mL    Cocaine (Benzoylecgonine) Not Detected NDET^Not Detected ng/mL    Methamphetamine (d-Methamphetamine) Not Detected NDET^Not Detected ng/mL    Opiates (Morphine) Not Detected NDET^Not Detected ng/mL    Amphetamine (d-Amphetamine) Not Detected NDET^Not Detected ng/mL    Benzodiazepines (Nordiazepam) Not Detected NDET^Not Detected ng/mL    Tricyclic Antidepressants (Desipramine) Not Detected NDET^Not Detected ng/mL    Methadone (Methadone) Not Detected NDET^Not Detected ng/mL    Barbiturates (Butalbital) Not Detected NDET^Not Detected ng/mL    Oxycodone (Oxycodone) Not Detected NDET^Not Detected ng/mL    Propoxyphene (Norpropoxyphene) Not Detected NDET^Not Detected ng/mL    Buprenorphine (Buprenorphine) Detected, Abnormal Result (A) NDET^Not Detected ng/mL           ASSESSMENT/PLAN:      (F11.20) Opioid use disorder, severe, dependence (H)  (primary encounter diagnosis)  Comment: would like to continue Suboxone  Plan: Urine Drugs of Abuse Screen Panel 13      Increase to Suboxone  8mg bid # 42    May use gabapentin -rx 300mg 1tab bid as further taper for duration of trip then discontinue as per psychiatry.  #20     Follow up   2/26/2018 1:00 PM (20 min)        Encourage meeting attendance and sponsorship to continue  -plans for meeting later today.      Opioid warning reviewed.  Risk of overdose following a period of abstinence due to decrease tolerance was discussed including risk of death.   Risk of overdose if using Suboxone with other substances particuarly benzodiazepines/alcohol was reviewed.       Strongly recommended abstain from alcohol, benzodiazepines, THC, opioids and other drugs of abuse with Suboxone.  Increased risk of relapse for opioids wi      (F31.89) Other bipolar disorder (H)  (F60.3) Borderline personality disorder  (F43.10) PTSD (post-traumatic stress disorder)  Comment: Current Prozac and Gabapentin (does have hx of overuse of gabapentin in past.   Plan: will need psychaitry follow up.  Encouraged theraputic counseling.       (M79.1) Muscle pain  Comment:obtain previous records from [place]  Plan: encourage hydration with hx of rhabdomyolysis      (K29.70) Gastritis without bleeding, unspecified chronicity, unspecified gastritis type  Comment: stable  Plan: continue Prilosec/Carafate as needed.       (N30.10) Interstitial cystitis  Plan: follow up with PCP.     ENCOUNTER FOR LONG TERM USE OF HIGH RISK MEDICATION   High Risk Drug Monitoring?  YES   Drug being monitored: Buprenorphine   Reason for drug: Opioid Use Disorder   What is being monitored?: Dosage, Cravings, Trigger, side effects, and continued abstinence.      Opioid warning reviewed.  Risk of overdose following a period of abstinence due to decrease tolerance was discussed including risk of death.   Risk of overdose if using Suboxone with other substances particuarly benzodiazepines/alcohol was reviewed.          Maine Fox MD  [facility]  [phone]

## 2018-02-12 ENCOUNTER — TELEPHONE (OUTPATIENT)
Dept: ADDICTION MEDICINE | Facility: CLINIC | Age: 38
End: 2018-02-12

## 2018-02-12 DIAGNOSIS — F11.20 OPIOID USE DISORDER, SEVERE, DEPENDENCE (H): ICD-10-CM

## 2018-02-12 RX ORDER — BUPRENORPHINE HYDROCHLORIDE AND NALOXONE HYDROCHLORIDE DIHYDRATE 8; 2 MG/1; MG/1
TABLET SUBLINGUAL
Qty: 42 EACH | Refills: 0 | Status: CANCELLED | OUTPATIENT
Start: 2018-02-12

## 2018-02-12 NOTE — TELEPHONE ENCOUNTER
Call back.      Writer discussed with Pt talking to nurse will be a faster option.    Pt in [place] in a hostile.    Pt stated that Pt is on 16 Mg daily went to [place] one week ago and Pt has been taking 32mg instead of 16.  Pt thinks that it's related to the altitude or situational being in the hostile with many other people.     Pt stated that she feels really good on 32mg.  Pt stated that she really struggled at first when she went out there but with the increase dose doing well.    Pt aware that if prescribed she will likely need to pay out of pocket.      Initially Pt increased to 24mg then she took another 8 mg later that night due to cravings and restless legs/pain.anxiety.      Pt requests a call back if needed to discuss.    Pt will be in [place] for 10 more days.      Juan Luis Juarez RN

## 2018-02-12 NOTE — TELEPHONE ENCOUNTER
Patient was given #45 8mg on 2/5. (initial rx written fo  #42 but changed with pharmacy to use easier amount as supplied by pharmacy).       Even if she had increased to four tab /day on date of last visit (vs. 3 recommended) then she would have used 28 tablets thus far which should leave her with 17 tab left at this point?         Please clarify exactly how many tablets she has left currently in her possession and what date she is returning.   Please ask her to explain discrepancy with above.

## 2018-02-12 NOTE — TELEPHONE ENCOUNTER
Reason for Call:  Other call back    Detailed comments: Patient called and asked to speak to Dr. Fox directly.  Offered to take a message.  Patient said that she is in [place] and she needs to speak to provider about her medication; she also mentioned that she will run out of medication while she is there.  Not sure if she will run out of only suboxone or other medications.    Phone Number Patient can be reached at: Home number on file [phone] (home)    Best Time: anytime; she is hoping for a call back this morning    Can we leave a detailed message on this number? YES    Call taken on 2/12/2018 at 8:03 AM by Maribell Petty

## 2018-02-12 NOTE — TELEPHONE ENCOUNTER
At last visit we discussed her travel and increase reluctantly to 24 mg.   I would not advise current dose of 32 mg and she should return to prescribed 24 mg.  I would recommend she be seen upon return from her travel to discuss if needed.   May split doses for better coverage if desired.

## 2018-02-12 NOTE — TELEPHONE ENCOUNTER
Pt stated that she didn't get enough for 24mg dosing, only 16 mg dosing.  Pt has enough for 2 and a half more days bt will by in [place] for another 10 days.    Pt is requesting a bridge at the 24mg dose for 8 days.      Juan Luis Juarez RN

## 2018-02-12 NOTE — TELEPHONE ENCOUNTER
Writer called pt.    Pt stated that she is in the shower right now and is covered in soap and unable to count her tablets.  Pt will call writer back.    Juan Luis Juarez RN

## 2018-02-13 RX ORDER — BUPRENORPHINE HYDROCHLORIDE AND NALOXONE HYDROCHLORIDE DIHYDRATE 8; 2 MG/1; MG/1
TABLET SUBLINGUAL
Qty: 21 EACH | Refills: 0 | Status: SHIPPED | OUTPATIENT
Start: 2018-02-13 | End: 2018-02-19

## 2018-02-13 NOTE — TELEPHONE ENCOUNTER
Pt called writer back.    Pt stated she was taking them 4 times per day but 2 in the morning     Pt was taking 40mg per day.      Pt has 6 tablets left.      Pt will be coming home on February 21st and she has an Appt on Thursday 22nd in the morning.    Pt has decreased her dose back down to 24mg daily.    Juan Luis Juarez RN

## 2018-02-13 NOTE — TELEPHONE ENCOUNTER
Rx called into Pt's pharmacy as requested.  Pt notified and informed that no dose changes are to be made on her own. Pt verbalized understanding.    Juan Luis Juarez RN

## 2018-02-13 NOTE — TELEPHONE ENCOUNTER
Incoming call from pt stating that the pharmacy does not carry subx tablets. Pt is requesting that script be switched to films. Pt would like a call back it is called in.     Please follow up. Contact info: [phone] (Ok to leave detailed message)    Tamiko Mcguire

## 2018-02-19 ENCOUNTER — OFFICE VISIT (OUTPATIENT)
Dept: ADDICTION MEDICINE | Facility: CLINIC | Age: 38
End: 2018-02-19
Payer: COMMERCIAL

## 2018-02-19 VITALS
BODY MASS INDEX: 27.2 KG/M2 | SYSTOLIC BLOOD PRESSURE: 138 MMHG | WEIGHT: 168.5 LBS | HEART RATE: 98 BPM | DIASTOLIC BLOOD PRESSURE: 74 MMHG | OXYGEN SATURATION: 99 % | RESPIRATION RATE: 16 BRPM

## 2018-02-19 DIAGNOSIS — F19.20 CHEMICAL DEPENDENCY (H): Primary | ICD-10-CM

## 2018-02-19 DIAGNOSIS — F11.20 OPIOID USE DISORDER, SEVERE, DEPENDENCE (H): ICD-10-CM

## 2018-02-19 PROCEDURE — 99215 OFFICE O/P EST HI 40 MIN: CPT | Performed by: PEDIATRICS

## 2018-02-19 PROCEDURE — 80306 DRUG TEST PRSMV INSTRMNT: CPT | Performed by: PEDIATRICS

## 2018-02-19 RX ORDER — BUPRENORPHINE HYDROCHLORIDE AND NALOXONE HYDROCHLORIDE DIHYDRATE 8; 2 MG/1; MG/1
TABLET SUBLINGUAL
Qty: 48 EACH | Refills: 0 | Status: SHIPPED | OUTPATIENT
Start: 2018-02-19 | End: 2018-03-01

## 2018-02-19 NOTE — MR AVS SNAPSHOT
After Visit Summary   2/19/2018    Yara Edouard    MRN: [id number]           Patient Information     Date Of Birth          1980        Visit Information        Provider Department      2/19/2018 1:00 PM Maine Fox MD [facility]        Today's Diagnoses     Chemical dependency (H)    -  1    Opioid use disorder, severe, dependence (H)           Follow-ups after your visit        Your next 10 appointments already scheduled     Mar 01, 2018  2:15 PM CST   Schizophrenia Follow Up with Satya Mendes MD   Psychiatry Clinic ([facility])    [address]  [address] [facility] [id number] [phone]            Mar 05, 2018  1:20 PM CST   Return Visit with Maine Fox MD   [facility] ([facility])    [address]  [address]  [facility] [id number] [phone]              Who to contact     If you have questions or need follow up information about today's clinic visit or your schedule please contact [facility] directly at [phone].  Normal or non-critical lab and imaging results will be communicated to you by Wealthfronthart, letter or phone within 4 business days after the clinic has received the results. If you do not hear from us within 7 days, please contact the clinic through Wealthfronthart or phone. If you have a critical or abnormal lab result, we will notify you by phone as soon as possible.  Submit refill requests through Uplike or call your pharmacy and they will forward the refill request to us. Please allow 3 business days for your refill to be completed.          Additional Information About Your Visit        MyChart Information     U[employer] gives you secure access to your electronic health record. If you see a primary care provider, you can also send messages to your care team and make appointments. If  you have questions, please call your primary care clinic.  If you do not have a primary care provider, please call [phone] and they will assist you.        Care EveryWhere ID     This is your Care EveryWhere ID. This could be used by other organizations to access your [facility] medical records  [id number]        Your Vitals Were     Pulse Respirations Pulse Oximetry BMI (Body Mass Index)          98 16 99% 27.2 kg/m2         Blood Pressure from Last 3 Encounters:   02/19/18 138/74   02/05/18 122/64   01/25/18 102/64    Weight from Last 3 Encounters:   02/19/18 168 lb 8 oz (76.4 kg)   02/05/18 177 lb 8 oz (80.5 kg)   01/25/18 176 lb (79.8 kg)              We Performed the Following     Drug Abuse Screen Panel 13, Urine (Pain Care Package)          Where to get your medicines      Some of these will need a paper prescription and others can be bought over the counter.  Ask your nurse if you have questions.     Bring a paper prescription for each of these medications     buprenorphine-naloxone 8-2 MG Subl sublingual tablet          Primary Care Provider Office Phone # Fax #    Neyda Ori Meza [phone]       [place] [address]  [facility] [address]        Equal Access to Services     TRISTAN GRULLON : Hadii bibi ku cammyo Soomaali, waaxda luqadaha, qaybta kaalmada adeegyada, desiree acuna. So [facility] [phone].    ATENCIÓN: Si habla español, tiene a galeana disposición servicios gratuitos de asistencia lingüística. Llame al [phone].    We comply with applicable federal civil rights laws and [place] laws. We do not discriminate on the basis of race, color, national origin, age, disability, sex, sexual orientation, or gender identity.            Thank you!     Thank you for choosing [facility]  for your care. Our goal is always to provide you with excellent care. Hearing back from our patients is one way we  can continue to improve our services. Please take a few minutes to complete the written survey that you may receive in the mail after your visit with us. Thank you!             Your Updated Medication List - Protect others around you: Learn how to safely use, store and throw away your medicines at www.disposemymeds.org.          This list is accurate as of 2/19/18 11:59 PM.  Always use your most recent med list.                   Brand Name Dispense Instructions for use Diagnosis    7-KETO LEAN PO      Take 1 tablet by mouth daily        albuterol 108 (90 BASE) MCG/ACT Inhaler    PROAIR HFA/PROVENTIL HFA/VENTOLIN HFA     Inhale 2 puffs into the lungs every 6 hours        ascorbic acid 1000 MG Tabs    vitamin C     Take 1,000 mg by mouth daily        buprenorphine-naloxone 8-2 MG Subl sublingual tablet    SUBOXONE    48 each    One tablet tid    Opioid use disorder, severe, dependence (H)       Calcium-Magnesium-Zinc 167-83-8 MG Tabs      Take 1 tablet by mouth every morning        cholecalciferol 18576 UNITS capsule    vitamin D3     Take 10,000 Units by mouth daily        chromium 200 MCG Caps capsule      Take 200 mcg by mouth daily        clonazePAM 1 MG tablet    klonoPIN    21 tablet    Take 1 tablet (1 mg) by mouth 3 times daily as needed for anxiety    Opioid use disorder, severe, dependence (H)       cloNIDine 0.1 MG tablet    CATAPRES    60 tablet    Take 1-2 tablets (0.1-0.2 mg) by mouth 3 times daily as needed (anxiety)    Chemical dependency (H)       DEPLIN 7.5 MG Tabs     30 tablet    Take 7.5 mg by mouth daily        DULCOLAX 5 MG EC tablet   Generic drug:  bisacodyl      Take 10 mg by mouth At Bedtime        fish oil-omega-3 fatty acids 1000 MG capsule      Take 1,000 mg by mouth 4 times daily        FLUoxetine 20 MG capsule    PROZAC    30 capsule    Take 1 capsule (20 mg) by mouth daily    Recurrent major depressive disorder, in partial remission (H)       gabapentin 300 MG capsule    NEURONTIN     20 capsule    Take 1 capsule (300 mg) by mouth 2 times daily as needed    Opioid use disorder, severe, dependence (H)       ibuprofen 200 MG tablet    ADVIL/MOTRIN     Take 800 mg by mouth every 6 hours as needed        L-Theanine 100 MG Caps      Take 1 capsule by mouth 2 times daily as needed        * lamoTRIgine 200 MG tablet    LaMICtal    30 tablet    Take 1 tablet (200 mg) by mouth At Bedtime with 150 mg every morning    PTSD (post-traumatic stress disorder)       * lamoTRIgine 150 MG tablet    LaMICtal    30 tablet    Take 1 tablet (150 mg) by mouth daily along with 200 mg tab at night    Other bipolar disorder (H)       Melatonin 10 MG Tabs tablet      Take 10 mg by mouth At Bedtime        multivitamin, therapeutic Tabs tablet      Take 1 tablet by mouth 2 times daily        naproxen 500 MG tablet    NAPROSYN     Take 1,000 mg by mouth daily        nicotine polacrilex 4 MG gum    NICORETTE    270 tablet    Place 1-2 each (4-8 mg) inside cheek every hour as needed for smoking cessation or other (nicotine withdrawal symptoms)    Chemical dependency (H)       omeprazole 20 MG CR capsule    priLOSEC    30 capsule    Take 1 capsule (20 mg) by mouth every morning    Chemical dependency (H)       phenazopyridine 200 MG tablet    PYRIDIUM    6 tablet    Take 1 tablet (200 mg) by mouth 3 times daily as needed for pain        propranolol 40 MG tablet    INDERAL    100 tablet    Take 1 tablet (40 mg) by mouth 4 times daily as needed for tremor    PTSD (post-traumatic stress disorder)       QUEtiapine 100 MG tablet    SEROquel    60 tablet    Take 1-2 tablets (100-200 mg) by mouth nightly as needed    PTSD (post-traumatic stress disorder)       sucralfate 1 GM tablet    CARAFATE     Take 1 g by mouth 2 times daily        * Notice:  This list has 2 medication(s) that are the same as other medications prescribed for you. Read the directions carefully, and ask your doctor or other care provider to review them with you.

## 2018-02-19 NOTE — PROGRESS NOTES
SUBJECTIVE:                                                    OPIOID USE DISORDER - BUPRENORPHINE FOLLOW UP:    Yara Edouard is a 37 year old female who presents to clinic today for follow up of  MAT for opioid use disorder.         Date of last visit:  2/12/2018    [place] Board of Pharmacy Data Base Reviewed:    YES;     No Concerns Noted   2/20/2018      Dose at last visit:   Suboxone 8mg increase to tid considered.         HPI:  Since last visit patient has been struggling.  See phone messages recently.      Came home early from trip due to anxiety and struggling.  At last visit patient requested increase to 8mg tid.  This was endorsed with understanding that receptors may be saturated at 16mg dose and unless clear benefit was noted would return to previous dose.  Patient self escalated dose on her own while in [place] for what sounds like emotional distress.  .  Was taking 16mg in am and 8mg three more times a day.  Called for bridge and was advised to and went back to 24 mg last Tuesday.   States she increased dose to do feeling like she was in withdrawal.  Reviewed role of naloxone in sx at higher doses as likely being culprit and that she likely exacerbated sx by self increase to 40mg daily.  Strongly advised no further self changes of dose or this may limit prescribing abilitiy.     Having high anxiety.  Can't   Has been back home since Saturday.  Had several panic attacks while there.  Thought altitude may have been effect.    Saturday was anniversary of fiance death.    No use since last visit.    Currently off gabapenin, tapered by psychiatricy due to hx of misuse. Dose was extended for one week due to travel.  Next visit with psychiatry next week.   Other medications Prozac 20mg daily, Lamictal 150mg am and 250mg am.  Clonidine prn 0.1 mg prn bid.  Propranolol (doesn't take with anxiety).    Hydroxizine prn but doesn't use much.    Came of Trintilex about a month ago.   Prozac started about 3-4  wk.   Had been on in past.   Did go to a few meetings while in [facility].    May be starting doing some scoring for standardizing testing which would be helpful for income and something to do.    Is feeling somewhat better now that she is home and going to meetings.         Status since last visit: Since last visit patient has been:struggling.     Intensity:     There has been: moderate craving    Suboxone Dose: too high    Progression of Symptoms/Precipitating Factors:     Cues to use and relapse triggers: Anxiety, Outside stressors and travel,    Trigger have been:  moderate    Accompanying Signs & Symptoms:    Side Effects: other likely secondary to naloxone    Sobriety:     Status: No substance use     Drug Screen: obtained    Alleviating factors/Other Therapies Tried:    Contact with sponsor has been: sporadic    Family and support system has been: neutral    Patient has been going to recovery meetings: sporadically    Recovery program has been : sporadic    Patient has been participating in professional counseling /therapy: NO        Social History     Social History Narrative    Living on own with two dogs (Zeny and Oscar).  In school (leave of absence).  [employer] for LAD, [facility]).         Patient Active Problem List    Diagnosis Date Noted     Interstitial cystitis 01/16/2018     Priority: Medium     Muscle pain 01/16/2018     Priority: Medium     Diagnosed with musculoskelatal disorder NOS  (hx of episodes of rhabdomyolysis).         Gastritis 01/16/2018     Priority: Medium     Hx of nausea , abd pain.  Follow up endoscopsy.          PTSD (post-traumatic stress disorder) 10/15/2014     Priority: Medium     Borderline personality disorder 10/15/2014     Priority: Medium     Other bipolar disorder (H) 11/19/2013     Priority: Medium     Diagnosis updated by automated process. Provider to review and confirm.       Chemical dependency (H) 11/03/2013     Priority: Medium     Drug overdose 10/30/2013      Priority: Medium       Problem list and histories reviewed & adjusted, as indicated.  Additional history: as documented        Current Outpatient Prescriptions on File Prior to Visit:  buprenorphine-naloxone (SUBOXONE) 8-2 MG SUBL sublingual tablet One tablet tid   gabapentin (NEURONTIN) 300 MG capsule Take 1 capsule (300 mg) by mouth 2 times daily as needed   clonazePAM (KLONOPIN) 1 MG tablet Take 1 tablet (1 mg) by mouth 3 times daily as needed for anxiety   cloNIDine (CATAPRES) 0.1 MG tablet Take 1-2 tablets (0.1-0.2 mg) by mouth 3 times daily as needed (anxiety)   FLUoxetine (PROZAC) 20 MG capsule Take 1 capsule (20 mg) by mouth daily   QUEtiapine (SEROQUEL) 100 MG tablet Take 1-2 tablets (100-200 mg) by mouth nightly as needed   lamoTRIgine (LAMICTAL) 200 MG tablet Take 1 tablet (200 mg) by mouth At Bedtime with 150 mg every morning   lamoTRIgine (LAMICTAL) 150 MG tablet Take 1 tablet (150 mg) by mouth daily along with 200 mg tab at night   propranolol (INDERAL) 40 MG tablet Take 1 tablet (40 mg) by mouth 4 times daily as needed for tremor   methylfolate 7.5 MG TABS Take 7.5 mg by mouth daily   nicotine polacrilex (NICORETTE) 4 MG gum Place 1-2 each (4-8 mg) inside cheek every hour as needed for smoking cessation or other (nicotine withdrawal symptoms)   omeprazole (PRILOSEC) 20 MG CR capsule Take 1 capsule (20 mg) by mouth every morning   naproxen (NAPROSYN) 500 MG tablet Take 1,000 mg by mouth daily   cholecalciferol (VITAMIN D3) 56689 UNITS capsule Take 10,000 Units by mouth daily   fish oil-omega-3 fatty acids 1000 MG capsule Take 1,000 mg by mouth 4 times daily   multivitamin, therapeutic (THERA-VIT) TABS tablet Take 1 tablet by mouth 2 times daily   Melatonin 10 MG TABS tablet Take 10 mg by mouth At Bedtime   Misc Natural Products (7-KETO LEAN PO) Take 1 tablet by mouth daily   chromium 200 MCG CAPS capsule Take 200 mcg by mouth daily   bisacodyl (DULCOLAX) 5 MG EC tablet Take 10 mg by mouth At Bedtime    ascorbic acid (VITAMIN C) 1000 MG TABS Take 1,000 mg by mouth daily   L-Theanine 100 MG CAPS Take 1 capsule by mouth 2 times daily as needed   Calcium-Magnesium-Zinc 167-83-8 MG TABS Take 1 tablet by mouth every morning   albuterol (PROAIR HFA/PROVENTIL HFA/VENTOLIN HFA) 108 (90 BASE) MCG/ACT Inhaler Inhale 2 puffs into the lungs every 6 hours   sucralfate (CARAFATE) 1 GM tablet Take 1 g by mouth 2 times daily   phenazopyridine (PYRIDIUM) 200 MG tablet Take 1 tablet (200 mg) by mouth 3 times daily as needed for pain   ibuprofen (ADVIL,MOTRIN) 200 MG tablet Take 800 mg by mouth every 6 hours as needed      No current facility-administered medications on file prior to visit.        Allergies   Allergen Reactions     Ceclor [Cefaclor Monohydrate]      Erythromycin      Wellbutrin [Bupropion Hydrobromide]          REVIEW OF SYSTEMS:  General: No acute withdrawal symptoms.  No recent infections or fever  Resp: No coughing, wheezing or shortness of breath  CV: No chest pains or palpitations  GI: No nausea, vomiting, abdominal pain, diarrhea, constipation  : No urinary frequency or dysuria,     Musculoskeletal: No significant muscle or joint pains, No edema  Neurologic: No numbness, tingling, weakness, problems with balance or coordination  Psychiatric: No acute concerns  Skin: No rashes    OBJECTIVE:    PHYSICAL EXAM:  /74  Pulse 98  Resp 16  Wt 168 lb 8 oz (76.4 kg)  SpO2 99%  BMI 27.2 kg/m2    GENERAL APPEARANCE:  alert, comfortable appearing  EYES:Eyes grossly normal to inspection  NEURO:  Gait normal.  No tremor. Coordination intact.   MENTAL STATUS EXAM:  Appearance/Behavior: No appearant distress  Speech: Normal  Mood/Affect: anxiety  Insight: Fair      Results for orders placed or performed in visit on 02/19/18   Drug Abuse Screen Panel 13, Urine (Pain Care Package)   Result Value Ref Range    Cannabinoids (21-lci-5-carboxy-9-THC) Not Detected NDET^Not Detected ng/mL    Phencyclidine (Phencyclidine)  Not Detected NDET^Not Detected ng/mL    Cocaine (Benzoylecgonine) Not Detected NDET^Not Detected ng/mL    Methamphetamine (d-Methamphetamine) Not Detected NDET^Not Detected ng/mL    Opiates (Morphine) Not Detected NDET^Not Detected ng/mL    Amphetamine (d-Amphetamine) Not Detected NDET^Not Detected ng/mL    Benzodiazepines (Nordiazepam) Not Detected NDET^Not Detected ng/mL    Tricyclic Antidepressants (Desipramine) Not Detected NDET^Not Detected ng/mL    Methadone (Methadone) Not Detected NDET^Not Detected ng/mL    Barbiturates (Butalbital) Not Detected NDET^Not Detected ng/mL    Oxycodone (Oxycodone) Not Detected NDET^Not Detected ng/mL    Propoxyphene (Norpropoxyphene) Not Detected NDET^Not Detected ng/mL    Buprenorphine (Buprenorphine) Detected, Abnormal Result (A) NDET^Not Detected ng/mL           ASSESSMENT/PLAN:      (F11.20) Opioid use disorder, severe, dependence (H)  (primary encounter diagnosis)  Comment: would like to continue Suboxone  Plan: Urine Drugs of Abuse Screen Panel 13      continue  Suboxone  8mg tid.   #48    No further dose increase /change without discussion with provider recommended.  Risk of dose changes, side effect, overdose discussed.   Counseled the patient on the importance of having a recovery program in addition to Medication assisted treatment.  Components include having some type of sober network, avoiding isolating, willingness  to change, avoiding triggers and managing cravings.  Strongly recommended abstain from alcohol, benzodiazepines, THC, opioids and other drugs of abuse.     Follow up 2wk      Encourage meeting attendance and sponsorship to continue -plans for meeting later today.       Opioid warning reviewed.  Risk of overdose following a period of abstinence due to decrease tolerance was discussed including risk of death.   Risk of overdose if using Suboxone with other substances particuarly benzodiazepines/alcohol was reviewed.       Strongly recommended abstain from  alcohol, benzodiazepines, THC, opioids and other drugs of abuse with Suboxone.  Increased risk of relapse for opioids wi      (F31.89) Other bipolar disorder (H)  (F60.3) Borderline personality disorder  (F43.10) PTSD (post-traumatic stress disorder)  Comment: Current Prozac  (does have hx of overuse of gabapentin in past. )  Plan: will need psychaitry follow up next week as planned. .  Encouraged theraputic counseling.       (M79.1) Muscle pain  Comment:obtain previous records from [place]  Plan: encourage hydration with hx of rhabdomyolysis          (N30.10) Interstitial cystitis  Plan: follow up with PCP.           ENCOUNTER FOR LONG TERM USE OF HIGH RISK MEDICATION   High Risk Drug Monitoring?  YES   Drug being monitored: Buprenorphine   Reason for drug: Opioid Use Disorder   What is being monitored?: Dosage, Cravings, Trigger, side effects, and continued abstinence.      Opioid warning reviewed.  Risk of overdose following a period of abstinence due to decrease tolerance was discussed including risk of death.   Risk of overdose if using Suboxone with other substances particuarly benzodiazepines/alcohol was reviewed.          Maine Fox MD  [facility]  [phone]

## 2018-02-19 NOTE — TELEPHONE ENCOUNTER
Incoming call from pt, pt stated per pharmacy Subx tabs are dispense in a quantity of #15 tabs.The current Rx was written for #48 tabs. Pharmacy will need a new Rx.    [facility] phar:  [phone]   Pt contact info:  [phone]      Dany Mckeon

## 2018-02-20 NOTE — TELEPHONE ENCOUNTER
Appears pt received a 22 day supply on 2/5 and should have a script she has yet to fill. Will call [facility] when they open to discuss.  Tess Villatoro RN

## 2018-02-20 NOTE — TELEPHONE ENCOUNTER
Called and spoke with pharmacy, last scriot was for 48 tablets, as they can only dispense in quantities of 15 requesting to dispense 45 tablets. Gave verbal order to dispense 45 tablets.  Tess Villatoro RN

## 2018-02-22 DIAGNOSIS — F19.20 CHEMICAL DEPENDENCY (H): ICD-10-CM

## 2018-02-22 RX ORDER — CLONIDINE HYDROCHLORIDE 0.1 MG/1
.1-.2 TABLET ORAL 3 TIMES DAILY PRN
Qty: 60 TABLET | Refills: 0 | Status: SHIPPED | OUTPATIENT
Start: 2018-02-22 | End: 2018-03-01

## 2018-02-22 NOTE — TELEPHONE ENCOUNTER
Medication requested: Clonidine 0.1 mg tabs  Last refilled: 2-5-18  Qty: 60      Last seen: 1-18-18  RTC: 5 wks  Cancel: 0  No-show: 0  Next appt: 3-1-18    Refill decision:   Refilled for 30 days per protocol.      Kathleen M Doege RN

## 2018-03-01 ENCOUNTER — OFFICE VISIT (OUTPATIENT)
Dept: ADDICTION MEDICINE | Facility: CLINIC | Age: 38
End: 2018-03-01
Payer: COMMERCIAL

## 2018-03-01 ENCOUNTER — OFFICE VISIT (OUTPATIENT)
Dept: PSYCHIATRY | Facility: CLINIC | Age: 38
End: 2018-03-01
Attending: PSYCHIATRY & NEUROLOGY
Payer: COMMERCIAL

## 2018-03-01 VITALS
WEIGHT: 168 LBS | HEART RATE: 70 BPM | BODY MASS INDEX: 27.12 KG/M2 | DIASTOLIC BLOOD PRESSURE: 76 MMHG | SYSTOLIC BLOOD PRESSURE: 114 MMHG | OXYGEN SATURATION: 96 % | TEMPERATURE: 98.7 F

## 2018-03-01 VITALS
BODY MASS INDEX: 27.34 KG/M2 | WEIGHT: 169.4 LBS | SYSTOLIC BLOOD PRESSURE: 123 MMHG | HEART RATE: 62 BPM | OXYGEN SATURATION: 98 % | DIASTOLIC BLOOD PRESSURE: 82 MMHG

## 2018-03-01 DIAGNOSIS — F19.20 CHEMICAL DEPENDENCY (H): Primary | ICD-10-CM

## 2018-03-01 DIAGNOSIS — F19.20 CHEMICAL DEPENDENCY (H): ICD-10-CM

## 2018-03-01 DIAGNOSIS — Z15.89 MTHFR MUTATION: ICD-10-CM

## 2018-03-01 DIAGNOSIS — F43.10 PTSD (POST-TRAUMATIC STRESS DISORDER): Primary | ICD-10-CM

## 2018-03-01 DIAGNOSIS — F33.41 RECURRENT MAJOR DEPRESSIVE DISORDER, IN PARTIAL REMISSION (H): ICD-10-CM

## 2018-03-01 DIAGNOSIS — F60.3 BORDERLINE PERSONALITY DISORDER (H): ICD-10-CM

## 2018-03-01 DIAGNOSIS — E55.9 VITAMIN D DEFICIENCY: ICD-10-CM

## 2018-03-01 DIAGNOSIS — F31.89 OTHER BIPOLAR DISORDER (H): ICD-10-CM

## 2018-03-01 DIAGNOSIS — F11.20 OPIOID USE DISORDER, SEVERE, DEPENDENCE (H): ICD-10-CM

## 2018-03-01 PROCEDURE — 80306 DRUG TEST PRSMV INSTRMNT: CPT | Performed by: PEDIATRICS

## 2018-03-01 PROCEDURE — 99215 OFFICE O/P EST HI 40 MIN: CPT | Performed by: PEDIATRICS

## 2018-03-01 PROCEDURE — G0463 HOSPITAL OUTPT CLINIC VISIT: HCPCS | Mod: ZF

## 2018-03-01 RX ORDER — BUPRENORPHINE HYDROCHLORIDE AND NALOXONE HYDROCHLORIDE DIHYDRATE 8; 2 MG/1; MG/1
TABLET SUBLINGUAL
Qty: 90 EACH | Refills: 0 | Status: SHIPPED | OUTPATIENT
Start: 2018-03-01 | End: 2018-03-26

## 2018-03-01 ASSESSMENT — PAIN SCALES - GENERAL: PAINLEVEL: NO PAIN (0)
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
## 2018-03-01 NOTE — NURSING NOTE
"Chief Complaint   Patient presents with     Addiction Problem       Initial There were no vitals taken for this visit. Estimated body mass index is 27.2 kg/(m^2) as calculated from the following:    Height as of 1/9/18: 5' 6\" (1.676 m).    Weight as of 2/19/18: 168 lb 8 oz (76.4 kg).  Medication Reconciliation: complete     Makeda Burgos CMA      "

## 2018-03-01 NOTE — MR AVS SNAPSHOT
After Visit Summary   3/1/2018    Yara Edouard    MRN: [id number]           Patient Information     Date Of Birth          1980        Visit Information        Provider Department      3/1/2018 2:15 PM Satya Mendes MD [facility]        Today's Diagnoses     PTSD (post-traumatic stress disorder)    -  1    Borderline personality disorder        Other bipolar disorder (H)        Vitamin D deficiency        MTHFR mutation (H)        Recurrent major depressive disorder, in partial remission (H)        Chemical dependency (H)           Follow-ups after your visit        Follow-up notes from your care team     Return in about 6 weeks (around 4/12/2018).      Your next 10 appointments already scheduled     Mar 26, 2018  2:00 PM CDT   Return Visit with Maine Fox MD   [facility] ([facility])    [address] [place] [address]  [address]  [facility] [address] [phone]            Apr 26, 2018  2:45 PM CDT   Schizophrenia Follow Up with Satya Mendes MD   Psychiatry Clinic ([facility])    [address]  [address] [address] [phone]              Future tests that were ordered for you today     Open Standing Orders        Priority Remaining Interval Expires Ordered    Urine Drugs of Abuse Screen Panel 13 Routine 100/100  3/26/2019 3/26/2018            Who to contact     Please call your clinic at [phone] to:    Ask questions about your health    Make or cancel appointments    Discuss your medicines    Learn about your test results    Speak to your doctor            Additional Information About Your Visit        MyChart Information     [employer] gives you secure access to your electronic health record. If you see a primary care provider, you can also send messages to your care team and make appointments. If you have questions, please call your primary care  clinic.  If you do not have a primary care provider, please call [phone] and they will assist you.      [employer] is an electronic gateway that provides easy, online access to your medical records. With [employer], you can request a clinic appointment, read your test results, renew a prescription or communicate with your care team.     To access your existing account, please contact your [facility] or call [phone] for assistance.        Care EveryWhere ID     This is your Care EveryWhere ID. This could be used by other organizations to access your [place] medical records  [id number]        Your Vitals Were     Pulse Pulse Oximetry BMI (Body Mass Index)             62 98% 27.34 kg/m2          Blood Pressure from Last 3 Encounters:   03/01/18 123/82   03/01/18 114/76   02/19/18 138/74    Weight from Last 3 Encounters:   03/01/18 76.8 kg (169 lb 6.4 oz)   03/01/18 76.2 kg (168 lb)   02/19/18 76.4 kg (168 lb 8 oz)                 Today's Medication Changes          These changes are accurate as of 3/1/18 11:59 PM.  If you have any questions, ask your nurse or doctor.               These medicines have changed or have updated prescriptions.        Dose/Directions    FLUoxetine 40 MG capsule   Commonly known as:  PROzac   This may have changed:    - medication strength  - how much to take   Used for:  Recurrent major depressive disorder, in partial remission (H)   Changed by:  Satya Mendes MD        Dose:  40 mg   Take 1 capsule (40 mg) by mouth daily   Quantity:  30 capsule   Refills:  1         Stop taking these medicines if you haven't already. Please contact your care team if you have questions.     gabapentin 300 MG capsule   Commonly known as:  NEURONTIN   Stopped by:  Satya Mendes MD           nicotine polacrilex 4 MG gum   Commonly known as:  NICORETTE   Stopped by:  Satya Mendes MD                Where to get your medicines      These medications were sent to  [id number] - [place] - [address] AT [facility] [address]  [address] [address]     Phone:  [phone]     cloNIDine 0.1 MG tablet    FLUoxetine 40 MG capsule    lamoTRIgine 150 MG tablet    lamoTRIgine 200 MG tablet    propranolol 40 MG tablet    QUEtiapine 100 MG tablet         Some of these will need a paper prescription and others can be bought over the counter.  Ask your nurse if you have questions.     Bring a paper prescription for each of these medications     buprenorphine-naloxone 8-2 MG Subl sublingual tablet                Primary Care Provider Office Phone # Fax #    Neyda Ori Meza [phone]       Formerly [facility] [address]  [facility] [address]        Equal Access to Services     [facility] : Hadii bibi lu hadasho Sofrandy, waaxda luqadaha, qaybta kaalmada adeayeshayada, desiree hurd . So [facility] [phone].    ATENCIÓN: Si habla español, tiene a galeana disposición servicios gratuitos de asistencia lingüística. Jese al [phone].    We comply with applicable federal civil rights laws and [place] laws. We do not discriminate on the basis of race, color, national origin, age, disability, sex, sexual orientation, or gender identity.            Thank you!     Thank you for choosing [facility]  for your care. Our goal is always to provide you with excellent care. Hearing back from our patients is one way we can continue to improve our services. Please take a few minutes to complete the written survey that you may receive in the mail after your visit with us. Thank you!             Your Updated Medication List - Protect others around you: Learn how to safely use, store and throw away your medicines at www.disposemymeds.org.          This list is accurate as of 3/1/18 11:59 PM.  Always use your most recent med list.                   Brand Name Dispense Instructions  for use Diagnosis    7-KETO LEAN PO      Take 1 tablet by mouth daily        albuterol 108 (90 BASE) MCG/ACT Inhaler    PROAIR HFA/PROVENTIL HFA/VENTOLIN HFA     Inhale 2 puffs into the lungs every 6 hours        ascorbic acid 1000 MG Tabs    vitamin C     Take 1,000 mg by mouth daily        buprenorphine-naloxone 8-2 MG Subl sublingual tablet    SUBOXONE    90 each    One tablet tid    Opioid use disorder, severe, dependence (H)       Calcium-Magnesium-Zinc 167-83-8 MG Tabs      Take 1 tablet by mouth every morning        cholecalciferol 33241 UNITS capsule    vitamin D3     Take 10,000 Units by mouth daily        chromium 200 MCG Caps capsule      Take 200 mcg by mouth daily        clonazePAM 1 MG tablet    klonoPIN    21 tablet    Take 1 tablet (1 mg) by mouth 3 times daily as needed for anxiety    Opioid use disorder, severe, dependence (H)       cloNIDine 0.1 MG tablet    CATAPRES    60 tablet    Take 1-2 tablets (0.1-0.2 mg) by mouth 3 times daily as needed (anxiety)    Chemical dependency (H)       DEPLIN 7.5 MG Tabs     30 tablet    Take 7.5 mg by mouth daily        DULCOLAX 5 MG EC tablet   Generic drug:  bisacodyl      Take 10 mg by mouth At Bedtime        fish oil-omega-3 fatty acids 1000 MG capsule      Take 1,000 mg by mouth 4 times daily        FLUoxetine 40 MG capsule    PROzac    30 capsule    Take 1 capsule (40 mg) by mouth daily    Recurrent major depressive disorder, in partial remission (H)       ibuprofen 200 MG tablet    ADVIL/MOTRIN     Take 800 mg by mouth every 6 hours as needed        L-Theanine 100 MG Caps      Take 1 capsule by mouth 2 times daily as needed        * lamoTRIgine 150 MG tablet    LaMICtal    30 tablet    Take 1 tablet (150 mg) by mouth daily along with 200 mg tab at night    Other bipolar disorder (H)       * lamoTRIgine 200 MG tablet    LaMICtal    30 tablet    Take 1 tablet (200 mg) by mouth At Bedtime with 150 mg every morning    PTSD (post-traumatic stress disorder)        Melatonin 10 MG Tabs tablet      Take 10 mg by mouth At Bedtime        multivitamin, therapeutic Tabs tablet      Take 1 tablet by mouth 2 times daily        naproxen 500 MG tablet    NAPROSYN     Take 1,000 mg by mouth daily        omeprazole 20 MG CR capsule    priLOSEC    30 capsule    Take 1 capsule (20 mg) by mouth every morning    Chemical dependency (H)       phenazopyridine 200 MG tablet    PYRIDIUM    6 tablet    Take 1 tablet (200 mg) by mouth 3 times daily as needed for pain        propranolol 40 MG tablet    INDERAL    100 tablet    Take 1 tablet (40 mg) by mouth 4 times daily as needed for tremor    PTSD (post-traumatic stress disorder)       QUEtiapine 100 MG tablet    SEROquel    60 tablet    Take 1-2 tablets (100-200 mg) by mouth nightly as needed    PTSD (post-traumatic stress disorder)       sucralfate 1 GM tablet    CARAFATE     Take 1 g by mouth 2 times daily        * Notice:  This list has 2 medication(s) that are the same as other medications prescribed for you. Read the directions carefully, and ask your doctor or other care provider to review them with you.

## 2018-03-01 NOTE — PROGRESS NOTES
SUBJECTIVE:                                                    OPIOID USE DISORDER - BUPRENORPHINE FOLLOW UP:    Yara Edouard is a 37 year old female who presents to clinic today for follow up of  MAT for opioid use disorder.         Date of last visit:  2/19/2018    [place] Board of Pharmacy Data Base Reviewed:    YES;         Dose at last visit:   Suboxone 8mg tid.         HPI:  Patient seen today but she was in rush due to needing to get to psychiatry appt.  Had changed appt from day following due to job interviews.  Has been depressed and anxious but plans to discuss with psychiatry.      No relapses since last visit.  Has stayed at Suboxone 8mg tid since then with no overuse.  (had escalated use significantly during recent trip as detailed in last note.   Had interview with [employer] and has second interview tomorrow as well as several others upcoming.     Has been attending meetings.      Status since last visit: Since last visit patient has been:stable    Intensity:     There has been: mild intermittent craving    Suboxone Dose: adequate    Progression of Symptoms/Precipitating Factors:     Cues to use and relapse triggers: Anxiety and Outside stressors    Trigger have been:  moderate    Accompanying Signs & Symptoms:    Side Effects: none    Sobriety:     Status: No substance use     Drug Screen: obtained    Alleviating factors/Other Therapies Tried:    Contact with sponsor has been: sporadic    Family and support system has been: neutral    Patient has been going to recovery meetings: regularly    Recovery program has been : active    Patient has been participating in professional counseling /therapy: YES        Social History     Social History Narrative    Living on own with two dogs (Zeny and Oscar).  In school (leave of absence).  [employer] for [employer], [employer]).         Patient Active Problem List    Diagnosis Date Noted     Interstitial cystitis 01/16/2018     Priority: Medium     Muscle pain  01/16/2018     Priority: Medium     Diagnosed with musculoskelatal disorder NOS  (hx of episodes of rhabdomyolysis).         Gastritis 01/16/2018     Priority: Medium     Hx of nausea , abd pain.  Follow up endoscopsy.          PTSD (post-traumatic stress disorder) 10/15/2014     Priority: Medium     Borderline personality disorder 10/15/2014     Priority: Medium     Other bipolar disorder (H) 11/19/2013     Priority: Medium     Diagnosis updated by automated process. Provider to review and confirm.       Chemical dependency (H) 11/03/2013     Priority: Medium     Drug overdose 10/30/2013     Priority: Medium       Problem list and histories reviewed & adjusted, as indicated.  Additional history: as documented        Current Outpatient Prescriptions on File Prior to Visit:  cloNIDine (CATAPRES) 0.1 MG tablet Take 1-2 tablets (0.1-0.2 mg) by mouth 3 times daily as needed (anxiety)   buprenorphine-naloxone (SUBOXONE) 8-2 MG SUBL sublingual tablet One tablet tid   gabapentin (NEURONTIN) 300 MG capsule Take 1 capsule (300 mg) by mouth 2 times daily as needed   clonazePAM (KLONOPIN) 1 MG tablet Take 1 tablet (1 mg) by mouth 3 times daily as needed for anxiety   FLUoxetine (PROZAC) 20 MG capsule Take 1 capsule (20 mg) by mouth daily   QUEtiapine (SEROQUEL) 100 MG tablet Take 1-2 tablets (100-200 mg) by mouth nightly as needed   lamoTRIgine (LAMICTAL) 200 MG tablet Take 1 tablet (200 mg) by mouth At Bedtime with 150 mg every morning   lamoTRIgine (LAMICTAL) 150 MG tablet Take 1 tablet (150 mg) by mouth daily along with 200 mg tab at night   propranolol (INDERAL) 40 MG tablet Take 1 tablet (40 mg) by mouth 4 times daily as needed for tremor   methylfolate 7.5 MG TABS Take 7.5 mg by mouth daily   nicotine polacrilex (NICORETTE) 4 MG gum Place 1-2 each (4-8 mg) inside cheek every hour as needed for smoking cessation or other (nicotine withdrawal symptoms)   omeprazole (PRILOSEC) 20 MG CR capsule Take 1 capsule (20 mg) by  mouth every morning   naproxen (NAPROSYN) 500 MG tablet Take 1,000 mg by mouth daily   cholecalciferol (VITAMIN D3) 96971 UNITS capsule Take 10,000 Units by mouth daily   fish oil-omega-3 fatty acids 1000 MG capsule Take 1,000 mg by mouth 4 times daily   multivitamin, therapeutic (THERA-VIT) TABS tablet Take 1 tablet by mouth 2 times daily   Melatonin 10 MG TABS tablet Take 10 mg by mouth At Bedtime   Misc Natural Products (7-KETO LEAN PO) Take 1 tablet by mouth daily   chromium 200 MCG CAPS capsule Take 200 mcg by mouth daily   bisacodyl (DULCOLAX) 5 MG EC tablet Take 10 mg by mouth At Bedtime   ascorbic acid (VITAMIN C) 1000 MG TABS Take 1,000 mg by mouth daily   L-Theanine 100 MG CAPS Take 1 capsule by mouth 2 times daily as needed   Calcium-Magnesium-Zinc 167-83-8 MG TABS Take 1 tablet by mouth every morning   albuterol (PROAIR HFA/PROVENTIL HFA/VENTOLIN HFA) 108 (90 BASE) MCG/ACT Inhaler Inhale 2 puffs into the lungs every 6 hours   sucralfate (CARAFATE) 1 GM tablet Take 1 g by mouth 2 times daily   phenazopyridine (PYRIDIUM) 200 MG tablet Take 1 tablet (200 mg) by mouth 3 times daily as needed for pain   ibuprofen (ADVIL,MOTRIN) 200 MG tablet Take 800 mg by mouth every 6 hours as needed      No current facility-administered medications on file prior to visit.        Allergies   Allergen Reactions     Ceclor [Cefaclor Monohydrate]      Erythromycin      Wellbutrin [Bupropion Hydrobromide]          REVIEW OF SYSTEMS:  General: No acute withdrawal symptoms.  No recent infections or fever  Resp: No coughing, wheezing or shortness of breath  CV: No chest pains or palpitations  GI: No nausea, vomiting, abdominal pain, diarrhea, constipation  : No urinary frequency or dysuria,     Musculoskeletal: No significant muscle or joint pains, No edema  Neurologic: No numbness, tingling, weakness, problems with balance or coordination  Psychiatric: No acute concerns  Skin: No rashes    OBJECTIVE:    PHYSICAL EXAM:  BP  114/76 (BP Location: Left arm)  Pulse 70  Temp 98.7  F (37.1  C) (Oral)  Wt 168 lb (76.2 kg)  SpO2 96%  BMI 27.12 kg/m2    GENERAL APPEARANCE:  alert, comfortable appearing  EYES:Eyes grossly normal to inspection  NEURO:  Gait normal.  No tremor. Coordination intact.   MENTAL STATUS EXAM:  Appearance/Behavior: No appearant distress  Speech: Normal  Mood/Affect: labile  Insight: Fair      Results for orders placed or performed in visit on 03/01/18   Urine Drugs of Abuse Screen Panel 13   Result Value Ref Range    Cannabinoids (24-nbf-6-carboxy-9-THC) Not Detected NDET^Not Detected ng/mL    Phencyclidine (Phencyclidine) Not Detected NDET^Not Detected ng/mL    Cocaine (Benzoylecgonine) Not Detected NDET^Not Detected ng/mL    Methamphetamine (d-Methamphetamine) Not Detected NDET^Not Detected ng/mL    Opiates (Morphine) Not Detected NDET^Not Detected ng/mL    Amphetamine (d-Amphetamine) Not Detected NDET^Not Detected ng/mL    Benzodiazepines (Nordiazepam) Not Detected NDET^Not Detected ng/mL    Tricyclic Antidepressants (Desipramine) Not Detected NDET^Not Detected ng/mL    Methadone (Methadone) Not Detected NDET^Not Detected ng/mL    Barbiturates (Butalbital) Not Detected NDET^Not Detected ng/mL    Oxycodone (Oxycodone) Not Detected NDET^Not Detected ng/mL    Propoxyphene (Norpropoxyphene) Not Detected NDET^Not Detected ng/mL    Buprenorphine (Buprenorphine) Detected, Abnormal Result (A) NDET^Not Detected ng/mL           ASSESSMENT/PLAN:         (F11.20) Opioid use disorder, severe, dependence (H)  (primary encounter diagnosis)  Comment: would like to continue Suboxone  Plan: Urine Drugs of Abuse Screen Panel 13      continue  Suboxone  8mg tid.   #90  No further dose increase /change without discussion with provider recommended.  Risk of dose changes, side effect, overdose discussed.   Counseled the patient on the importance of having a recovery program in addition to Medication assisted treatment.  Components  include having some type of sober network, avoiding isolating, willingness  to change, avoiding triggers and managing cravings.  Strongly recommended abstain from alcohol, benzodiazepines, THC, opioids and other drugs of abuse.      Follow up 4wk       Encourage meeting attendance and sponsorship to continue -plans for meeting later today.       Opioid warning reviewed.  Risk of overdose following a period of abstinence due to decrease tolerance was discussed including risk of death.   Risk of overdose if using Suboxone with other substances particuarly benzodiazepines/alcohol was reviewed.       Strongly recommended abstain from alcohol, benzodiazepines, THC, opioids and other drugs of abuse with Suboxone.  Increased risk of relapse for opioids wi      (F31.89) Other bipolar disorder (H)  (F60.3) Borderline personality disorder  (F43.10) PTSD (post-traumatic stress disorder)  Comment: Current Prozac  (does have hx of overuse of gabapentin in past. )  Plan: will need psychaitry follow up today as planned. .  Encouraged theraputic counseling.       (M79.1) Muscle pain  Comment:obtain previous records from [facility]  Plan: encourage hydration with hx of rhabdomyolysis          (N30.10) Interstitial cystitis  Plan: follow up with PCP.         ENCOUNTER FOR LONG TERM USE OF HIGH RISK MEDICATION   High Risk Drug Monitoring?  YES   Drug being monitored: Buprenorphine   Reason for drug: Opioid Use Disorder   What is being monitored?: Dosage, Cravings, Trigger, side effects, and continued abstinence.      Opioid warning reviewed.  Risk of overdose following a period of abstinence due to decrease tolerance was discussed including risk of death.   Risk of overdose if using Suboxone with other substances particuarly benzodiazepines/alcohol was reviewed.          Maine Fox MD  [facility]  [phone]

## 2018-03-01 NOTE — MR AVS SNAPSHOT
After Visit Summary   3/1/2018    Yara Edouard    MRN: [id number]           Patient Information     Date Of Birth          1980        Visit Information        Provider Department      3/1/2018 1:40 PM Maine Fox MD [facility] [place] [facility]        Today's Diagnoses     Chemical dependency (H)    -  1    Opioid use disorder, severe, dependence (H)           Follow-ups after your visit        Your next 10 appointments already scheduled     Mar 02, 2018 11:00 AM CST   Return Visit with Maine Fox MD   [facility] ([facility] [place])    [address] [facility] [address]  [address]  [facility] [address] [phone]            Apr 26, 2018  2:45 PM CDT   Schizophrenia Follow Up with Satya Mendes MD   Psychiatry Clinic ([facility])    [address]  [address] [facility] [id number] [phone]              Who to contact     If you have questions or need follow up information about today's clinic visit or your schedule please contact [facility] directly at [phone].  Normal or non-critical lab and imaging results will be communicated to you by MyChart, letter or phone within 4 business days after the clinic has received the results. If you do not hear from us within 7 days, please contact the clinic through MailTimehart or phone. If you have a critical or abnormal lab result, we will notify you by phone as soon as possible.  Submit refill requests through Sookasa or call your pharmacy and they will forward the refill request to us. Please allow 3 business days for your refill to be completed.          Additional Information About Your Visit        MyChart Information     Sookasa gives you secure access to your electronic health record. If you see a primary care provider, you can also send messages to your care team and make appointments. If you  have questions, please call your primary care clinic.  If you do not have a primary care provider, please call [phone] and they will assist you.        Care EveryWhere ID     This is your Care EveryWhere ID. This could be used by other organizations to access your [facility] medical records  [id number]        Your Vitals Were     Pulse Temperature Pulse Oximetry BMI (Body Mass Index)          70 98.7  F (37.1  C) (Oral) 96% 27.12 kg/m2         Blood Pressure from Last 3 Encounters:   03/01/18 123/82   03/01/18 114/76   02/19/18 138/74    Weight from Last 3 Encounters:   03/01/18 169 lb 6.4 oz (76.8 kg)   03/01/18 168 lb (76.2 kg)   02/19/18 168 lb 8 oz (76.4 kg)              We Performed the Following     Urine Drugs of Abuse Screen Panel 13          Today's Medication Changes          These changes are accurate as of 3/1/18  4:25 PM.  If you have any questions, ask your nurse or doctor.               Stop taking these medicines if you haven't already. Please contact your care team if you have questions.     gabapentin 300 MG capsule   Commonly known as:  NEURONTIN   Stopped by:  Satya Mendes MD           nicotine polacrilex 4 MG gum   Commonly known as:  NICORETTE   Stopped by:  Satya Mendes MD                Where to get your medicines      Some of these will need a paper prescription and others can be bought over the counter.  Ask your nurse if you have questions.     Bring a paper prescription for each of these medications     buprenorphine-naloxone 8-2 MG Subl sublingual tablet                Primary Care Provider Office Phone # Fax #    Jkrvoj Ori Meza [phone]       [facility] [address]  [facility] [address]        Equal Access to Services     [facility] : Yu Barnes, ravin danielle, vance snell, desiree acuna. So [facility] [phone].    ATENCIÓN: jorge Schulz  disposición servicios gratuitos de asistencia lingüística. Jese nelson [phone].    We comply with applicable federal civil rights laws and [place] laws. We do not discriminate on the basis of race, color, national origin, age, disability, sex, sexual orientation, or gender identity.            Thank you!     Thank you for choosing [facility] PRIMARY CARE  for your care. Our goal is always to provide you with excellent care. Hearing back from our patients is one way we can continue to improve our services. Please take a few minutes to complete the written survey that you may receive in the mail after your visit with us. Thank you!             Your Updated Medication List - Protect others around you: Learn how to safely use, store and throw away your medicines at www.disposemymeds.org.          This list is accurate as of 3/1/18  4:25 PM.  Always use your most recent med list.                   Brand Name Dispense Instructions for use Diagnosis    7-KETO LEAN PO      Take 1 tablet by mouth daily        albuterol 108 (90 BASE) MCG/ACT Inhaler    PROAIR HFA/PROVENTIL HFA/VENTOLIN HFA     Inhale 2 puffs into the lungs every 6 hours        ascorbic acid 1000 MG Tabs    vitamin C     Take 1,000 mg by mouth daily        buprenorphine-naloxone 8-2 MG Subl sublingual tablet    SUBOXONE    90 each    One tablet tid    Opioid use disorder, severe, dependence (H)       Calcium-Magnesium-Zinc 167-83-8 MG Tabs      Take 1 tablet by mouth every morning        cholecalciferol 40974 UNITS capsule    vitamin D3     Take 10,000 Units by mouth daily        chromium 200 MCG Caps capsule      Take 200 mcg by mouth daily        clonazePAM 1 MG tablet    klonoPIN    21 tablet    Take 1 tablet (1 mg) by mouth 3 times daily as needed for anxiety    Opioid use disorder, severe, dependence (H)       cloNIDine 0.1 MG tablet    CATAPRES    60 tablet    Take 1-2 tablets (0.1-0.2 mg) by mouth 3 times daily as needed (anxiety)     Chemical dependency (H)       DEPLIN 7.5 MG Tabs     30 tablet    Take 7.5 mg by mouth daily        DULCOLAX 5 MG EC tablet   Generic drug:  bisacodyl      Take 10 mg by mouth At Bedtime        fish oil-omega-3 fatty acids 1000 MG capsule      Take 1,000 mg by mouth 4 times daily        FLUoxetine 20 MG capsule    PROZAC    30 capsule    Take 1 capsule (20 mg) by mouth daily    Recurrent major depressive disorder, in partial remission (H)       ibuprofen 200 MG tablet    ADVIL/MOTRIN     Take 800 mg by mouth every 6 hours as needed        L-Theanine 100 MG Caps      Take 1 capsule by mouth 2 times daily as needed        * lamoTRIgine 200 MG tablet    LaMICtal    30 tablet    Take 1 tablet (200 mg) by mouth At Bedtime with 150 mg every morning    PTSD (post-traumatic stress disorder)       * lamoTRIgine 150 MG tablet    LaMICtal    30 tablet    Take 1 tablet (150 mg) by mouth daily along with 200 mg tab at night    Other bipolar disorder (H)       Melatonin 10 MG Tabs tablet      Take 10 mg by mouth At Bedtime        multivitamin, therapeutic Tabs tablet      Take 1 tablet by mouth 2 times daily        naproxen 500 MG tablet    NAPROSYN     Take 1,000 mg by mouth daily        omeprazole 20 MG CR capsule    priLOSEC    30 capsule    Take 1 capsule (20 mg) by mouth every morning    Chemical dependency (H)       phenazopyridine 200 MG tablet    PYRIDIUM    6 tablet    Take 1 tablet (200 mg) by mouth 3 times daily as needed for pain        propranolol 40 MG tablet    INDERAL    100 tablet    Take 1 tablet (40 mg) by mouth 4 times daily as needed for tremor    PTSD (post-traumatic stress disorder)       QUEtiapine 100 MG tablet    SEROquel    60 tablet    Take 1-2 tablets (100-200 mg) by mouth nightly as needed    PTSD (post-traumatic stress disorder)       sucralfate 1 GM tablet    CARAFATE     Take 1 g by mouth 2 times daily        * Notice:  This list has 2 medication(s) that are the same as other medications  prescribed for you. Read the directions carefully, and ask your doctor or other care provider to review them with you.

## 2018-03-01 NOTE — NURSING NOTE
Chief Complaint   Patient presents with     Recheck Medication     other bipolar disorder     Reviewed allergies, medications, pharmacy and smoking status.  Administered abuse screening questions   Obtained weight, pain level, blood pressure and heart rate   02 level at patient request

## 2018-03-02 ENCOUNTER — TELEPHONE (OUTPATIENT)
Dept: PSYCHIATRY | Facility: CLINIC | Age: 38
End: 2018-03-02

## 2018-03-02 RX ORDER — PROPRANOLOL HYDROCHLORIDE 40 MG/1
40 TABLET ORAL 4 TIMES DAILY PRN
Qty: 100 TABLET | Refills: 3 | Status: SHIPPED | OUTPATIENT
Start: 2018-03-02 | End: 2019-03-07

## 2018-03-02 RX ORDER — FLUOXETINE 40 MG/1
40 CAPSULE ORAL DAILY
Qty: 30 CAPSULE | Refills: 1 | Status: SHIPPED | OUTPATIENT
Start: 2018-03-02 | End: 2018-04-26

## 2018-03-02 RX ORDER — LAMOTRIGINE 150 MG/1
150 TABLET ORAL DAILY
Qty: 30 TABLET | Refills: 3 | Status: SHIPPED | OUTPATIENT
Start: 2018-03-02 | End: 2018-05-03

## 2018-03-02 RX ORDER — QUETIAPINE FUMARATE 100 MG/1
100-200 TABLET, FILM COATED ORAL
Qty: 60 TABLET | Refills: 1 | Status: SHIPPED | OUTPATIENT
Start: 2018-03-02 | End: 2020-07-31

## 2018-03-02 RX ORDER — LAMOTRIGINE 200 MG/1
200 TABLET ORAL AT BEDTIME
Qty: 30 TABLET | Refills: 1 | Status: SHIPPED | OUTPATIENT
Start: 2018-03-02 | End: 2018-05-03

## 2018-03-02 RX ORDER — CLONIDINE HYDROCHLORIDE 0.1 MG/1
.1-.2 TABLET ORAL 3 TIMES DAILY PRN
Qty: 60 TABLET | Refills: 0 | Status: SHIPPED | OUTPATIENT
Start: 2018-03-02 | End: 2018-03-23

## 2018-03-02 NOTE — TELEPHONE ENCOUNTER
Message          Dalia Trujillo RN Flaa, Jodi L, RN         Phone Number: [phone]                         Previous Messages            From: Lizbeth More      Sent: 3/2/2018  11:23 AM        To: MATT Moser, called to say that Dr. Mendes, did not send over her prescriptions over yesterday after her visit. Can this please get done today?

## 2018-03-02 NOTE — TELEPHONE ENCOUNTER
Called pharmacy:    Pt does not have RFs on file of the following meds:  1.  Lamictal 200 mg tab daily (along with a 150 mg tab), #30  2.  Seroquel 100 mg tab, 1-2 tabs at night prn, #60  3.  Fluoxetine 20 mg tab daily, #30  4.  Clonidine 1 mg tab, 1-2 tabs TID prn. #60    Pt has RFs on file of the following meds:  Propranolol, Lamictal 150 mg     Routed to Dr. Mendes for authorization to RF 1, 2, 3, 4 above.

## 2018-03-23 ENCOUNTER — TELEPHONE (OUTPATIENT)
Dept: PSYCHIATRY | Facility: CLINIC | Age: 38
End: 2018-03-23

## 2018-03-23 DIAGNOSIS — F19.20 CHEMICAL DEPENDENCY (H): ICD-10-CM

## 2018-03-23 RX ORDER — CLONIDINE HYDROCHLORIDE 0.1 MG/1
.1-.2 TABLET ORAL 3 TIMES DAILY PRN
Qty: 60 TABLET | Refills: 0 | Status: SHIPPED | OUTPATIENT
Start: 2018-03-23 | End: 2018-04-09

## 2018-03-23 NOTE — TELEPHONE ENCOUNTER
----- Message from Sharmila Lunsford RN sent at 3/23/2018  2:13 PM CDT -----  Regarding: FW: Refill Request/Issue - Out of med after today  Contact: [phone]      ----- Message -----     From: Selma Villalobos     Sent: 3/23/2018   1:06 PM       To: Beverly Smith RN  Subject: Refill Request/Issue - Out of med after today    Caller:  Yara  Relationship to pt: self  Medication:   clonidine 0.1 mg  How many days left of med do you have left (if >3 day supply, redirect to pharmacy): out after today  Pharmacy and location: [facility]  Pending appt date:  4/26/18  Okay to leave detailed VM:  yes    The prescription states the patient may take 1-2 tabs  3/day as needed, but the qty was written for only 60 (less than 30 day supply)

## 2018-03-23 NOTE — TELEPHONE ENCOUNTER
Received RF request from patient for:    Clonidine 0.1 mg tab - take 1-2 tabs TID PRN for anxiety    Last RF:  3/02/18 for 60 tabs     Due for RF:  yes    Appointment History:  Last appt: 3/01/18  RTC: 5 weeks  Cancel: none  No-show: none  Next appt: 4/26/18    It appears that patient is taking 2 - 3 tabs/day.    Will refill 60 tabs as previously ordered and route to Dr. Mendes for FYI.    Called patient to let her know that the refill has been sent to the pharmacy.

## 2018-03-26 ENCOUNTER — OFFICE VISIT (OUTPATIENT)
Dept: ADDICTION MEDICINE | Facility: CLINIC | Age: 38
End: 2018-03-26
Payer: COMMERCIAL

## 2018-03-26 VITALS
SYSTOLIC BLOOD PRESSURE: 120 MMHG | DIASTOLIC BLOOD PRESSURE: 70 MMHG | TEMPERATURE: 98.4 F | OXYGEN SATURATION: 96 % | HEART RATE: 63 BPM | RESPIRATION RATE: 14 BRPM | BODY MASS INDEX: 28.08 KG/M2 | WEIGHT: 174 LBS

## 2018-03-26 DIAGNOSIS — F43.10 PTSD (POST-TRAUMATIC STRESS DISORDER): ICD-10-CM

## 2018-03-26 DIAGNOSIS — N30.10 INTERSTITIAL CYSTITIS: ICD-10-CM

## 2018-03-26 DIAGNOSIS — F11.20 OPIOID USE DISORDER, SEVERE, DEPENDENCE (H): Primary | ICD-10-CM

## 2018-03-26 DIAGNOSIS — F60.3 BORDERLINE PERSONALITY DISORDER (H): ICD-10-CM

## 2018-03-26 DIAGNOSIS — F31.89 OTHER BIPOLAR DISORDER (H): ICD-10-CM

## 2018-03-26 PROCEDURE — 80306 DRUG TEST PRSMV INSTRMNT: CPT | Performed by: PEDIATRICS

## 2018-03-26 PROCEDURE — 99215 OFFICE O/P EST HI 40 MIN: CPT | Performed by: PEDIATRICS

## 2018-03-26 RX ORDER — BUPRENORPHINE HYDROCHLORIDE AND NALOXONE HYDROCHLORIDE DIHYDRATE 8; 2 MG/1; MG/1
TABLET SUBLINGUAL
Qty: 90 EACH | Refills: 0 | Status: SHIPPED | OUTPATIENT
Start: 2018-03-26 | End: 2018-04-19

## 2018-03-26 NOTE — MR AVS SNAPSHOT
After Visit Summary   3/26/2018    Yara Edouard    MRN: [id number]           Patient Information     Date Of Birth          1980        Visit Information        Provider Department      3/26/2018 2:00 PM Maine Fox MD [facility]        Today's Diagnoses     Opioid use disorder, severe, dependence (H)    -  1    Other bipolar disorder (H)        PTSD (post-traumatic stress disorder)        Borderline personality disorder        Interstitial cystitis           Follow-ups after your visit        Your next 10 appointments already scheduled     Apr 26, 2018  2:45 PM CDT   Schizophrenia Follow Up with Satya Mendes MD   Psychiatry Clinic ([facility])    [address]  [address] [address] [phone]              Future tests that were ordered for you today     Open Standing Orders        Priority Remaining Interval Expires Ordered    Urine Drugs of Abuse Screen Panel 13 Routine 99/100  3/26/2019 3/26/2018            Who to contact     If you have questions or need follow up information about today's clinic visit or your schedule please contact [facility] PRIMARY CARE directly at [phone].  Normal or non-critical lab and imaging results will be communicated to you by AvidRetailhart, letter or phone within 4 business days after the clinic has received the results. If you do not hear from us within 7 days, please contact the clinic through AvidRetailhart or phone. If you have a critical or abnormal lab result, we will notify you by phone as soon as possible.  Submit refill requests through Cinario or call your pharmacy and they will forward the refill request to us. Please allow 3 business days for your refill to be completed.          Additional Information About Your Visit        AvidRetailhart Information     Cinario gives you secure access to your electronic health record. If you see a primary  care provider, you can also send messages to your care team and make appointments. If you have questions, please call your primary care clinic.  If you do not have a primary care provider, please call [phone] and they will assist you.        Care EveryWhere ID     This is your Care EveryWhere ID. This could be used by other organizations to access your [facility] medical records  [id number]        Your Vitals Were     Pulse Temperature Respirations Pulse Oximetry BMI (Body Mass Index)       63 98.4  F (36.9  C) (Oral) 14 96% 28.08 kg/m2        Blood Pressure from Last 3 Encounters:   03/26/18 120/70   03/01/18 123/82   03/01/18 114/76    Weight from Last 3 Encounters:   03/26/18 174 lb (78.9 kg)   03/01/18 169 lb 6.4 oz (76.8 kg)   03/01/18 168 lb (76.2 kg)              We Performed the Following     Urine Drugs of Abuse Screen Panel 13          Where to get your medicines      Some of these will need a paper prescription and others can be bought over the counter.  Ask your nurse if you have questions.     Bring a paper prescription for each of these medications     buprenorphine-naloxone 8-2 MG Subl sublingual tablet          Primary Care Provider Office Phone # Fax #    Tpncel Ori Meza [phone]       [place] [address]  [facility] [address]        Equal Access to Services     TRISTAN GRULLON AH: Hadii bibi chawlao Sofrandy, waaxda luqadaha, qaybta kaalmada channing, desiree hurd . So [facility] [phone].    ATENCIÓN: Si habla español, tiene a galeana disposición servicios gratuitos de asistencia lingüística. Llame al [phone].    We comply with applicable federal civil rights laws and [place] laws. We do not discriminate on the basis of race, color, national origin, age, disability, sex, sexual orientation, or gender identity.            Thank you!     Thank you for choosing [facility] PRIMARY CARE  for your care.  Our goal is always to provide you with excellent care. Hearing back from our patients is one way we can continue to improve our services. Please take a few minutes to complete the written survey that you may receive in the mail after your visit with us. Thank you!             Your Updated Medication List - Protect others around you: Learn how to safely use, store and throw away your medicines at www.disposemymeds.org.          This list is accurate as of 3/26/18 11:59 PM.  Always use your most recent med list.                   Brand Name Dispense Instructions for use Diagnosis    7-KETO LEAN PO      Take 1 tablet by mouth daily        albuterol 108 (90 BASE) MCG/ACT Inhaler    PROAIR HFA/PROVENTIL HFA/VENTOLIN HFA     Inhale 2 puffs into the lungs every 6 hours        ascorbic acid 1000 MG Tabs    vitamin C     Take 1,000 mg by mouth daily        buprenorphine-naloxone 8-2 MG Subl sublingual tablet    SUBOXONE    90 each    One tablet tid    Opioid use disorder, severe, dependence (H)       Calcium-Magnesium-Zinc 167-83-8 MG Tabs      Take 1 tablet by mouth every morning        cholecalciferol 54492 UNITS capsule    vitamin D3     Take 10,000 Units by mouth daily        chromium 200 MCG Caps capsule      Take 200 mcg by mouth daily        clonazePAM 1 MG tablet    klonoPIN    21 tablet    Take 1 tablet (1 mg) by mouth 3 times daily as needed for anxiety    Opioid use disorder, severe, dependence (H)       cloNIDine 0.1 MG tablet    CATAPRES    60 tablet    Take 1-2 tablets (0.1-0.2 mg) by mouth 3 times daily as needed (anxiety)    Chemical dependency (H)       DEPLIN 7.5 MG Tabs     30 tablet    Take 7.5 mg by mouth daily        DULCOLAX 5 MG EC tablet   Generic drug:  bisacodyl      Take 10 mg by mouth At Bedtime        fish oil-omega-3 fatty acids 1000 MG capsule      Take 1,000 mg by mouth 4 times daily        FLUoxetine 40 MG capsule    PROzac    30 capsule    Take 1 capsule (40 mg) by mouth daily    Recurrent  major depressive disorder, in partial remission (H)       ibuprofen 200 MG tablet    ADVIL/MOTRIN     Take 800 mg by mouth every 6 hours as needed        L-Theanine 100 MG Caps      Take 1 capsule by mouth 2 times daily as needed        * lamoTRIgine 150 MG tablet    LaMICtal    30 tablet    Take 1 tablet (150 mg) by mouth daily along with 200 mg tab at night    Other bipolar disorder (H)       * lamoTRIgine 200 MG tablet    LaMICtal    30 tablet    Take 1 tablet (200 mg) by mouth At Bedtime with 150 mg every morning    PTSD (post-traumatic stress disorder)       Melatonin 10 MG Tabs tablet      Take 10 mg by mouth At Bedtime        multivitamin, therapeutic Tabs tablet      Take 1 tablet by mouth 2 times daily        naproxen 500 MG tablet    NAPROSYN     Take 1,000 mg by mouth daily        omeprazole 20 MG CR capsule    priLOSEC    30 capsule    Take 1 capsule (20 mg) by mouth every morning    Chemical dependency (H)       phenazopyridine 200 MG tablet    PYRIDIUM    6 tablet    Take 1 tablet (200 mg) by mouth 3 times daily as needed for pain        propranolol 40 MG tablet    INDERAL    100 tablet    Take 1 tablet (40 mg) by mouth 4 times daily as needed for tremor    PTSD (post-traumatic stress disorder)       QUEtiapine 100 MG tablet    SEROquel    60 tablet    Take 1-2 tablets (100-200 mg) by mouth nightly as needed    PTSD (post-traumatic stress disorder)       sucralfate 1 GM tablet    CARAFATE     Take 1 g by mouth 2 times daily        * Notice:  This list has 2 medication(s) that are the same as other medications prescribed for you. Read the directions carefully, and ask your doctor or other care provider to review them with you.

## 2018-03-26 NOTE — PROGRESS NOTES
SUBJECTIVE:                                                    OPIOID USE DISORDER - BUPRENORPHINE FOLLOW UP:    Yara Edouard is a 37 year old female who presents to clinic today for follow up of  MAT for opioid use disorder.         Date of last visit:  3/2/2018    [place] Board of Pharmacy Data Base Reviewed:    YES;     3/2/18 Suboxone 8mg tid #90    Dose at last visit:   Suboxone 8mg tid.     Brief History:  Initial visit 1/16/18 Opioid for many years.  3 yr methadone MAT then six months Suboxone MAT.  Started Heroin age 29.  Hx of kratom use more recently as well as use of large amounts of tramadol and gabapentin.  Detox 1/4/18-1/9/18. Many previous treatments.    HPI:  Suboxone 8mg tid going well.  No craving.  No dose changes or extra doses.  Feels mental health is much improved.  Feels clonidine has been helpful.  Followed by psychiatry.   Got job at [employer] and is very excited about this.   Will start working longer shifts with her mother watching her dogs. Suboxone 8mg tid.  Going to gym.           Status since last visit: Since last visit patient has been:stable    Intensity:     There has been: mild intermittent craving    Suboxone Dose: adequate    Progression of Symptoms/Precipitating Factors:     Cues to use and relapse triggers: None    Trigger have been:  mild    Accompanying Signs & Symptoms:    Side Effects: none    Sobriety:     Status: No substance use     Drug Screen: obtained    Alleviating factors/Other Therapies Tried:    Contact with sponsor has been: regular    Family and support system has been: helpful    Patient has been going to recovery meetings: regularly    Recovery program has been : active    Patient has been participating in professional counseling /therapy: YES        Social History     Social History Narrative    Living on own with two dogs (Zeny and Oscar).  In school (leave of absence).  [employer] for [facility], [facility]).         Patient Active Problem List    Diagnosis Date  Noted     Interstitial cystitis 01/16/2018     Priority: Medium     Muscle pain 01/16/2018     Priority: Medium     Diagnosed with musculoskelatal disorder NOS  (hx of episodes of rhabdomyolysis).         Gastritis 01/16/2018     Priority: Medium     Hx of nausea , abd pain.  Follow up endoscopsy.          PTSD (post-traumatic stress disorder) 10/15/2014     Priority: Medium     Borderline personality disorder 10/15/2014     Priority: Medium     Other bipolar disorder (H) 11/19/2013     Priority: Medium     Diagnosis updated by automated process. Provider to review and confirm.       Chemical dependency (H) 11/03/2013     Priority: Medium     Drug overdose 10/30/2013     Priority: Medium       Problem list and histories reviewed & adjusted, as indicated.  Additional history: as documented        Current Outpatient Prescriptions on File Prior to Visit:  cloNIDine (CATAPRES) 0.1 MG tablet Take 1-2 tablets (0.1-0.2 mg) by mouth 3 times daily as needed (anxiety)   FLUoxetine (PROZAC) 40 MG capsule Take 1 capsule (40 mg) by mouth daily   lamoTRIgine (LAMICTAL) 150 MG tablet Take 1 tablet (150 mg) by mouth daily along with 200 mg tab at night   lamoTRIgine (LAMICTAL) 200 MG tablet Take 1 tablet (200 mg) by mouth At Bedtime with 150 mg every morning   propranolol (INDERAL) 40 MG tablet Take 1 tablet (40 mg) by mouth 4 times daily as needed for tremor   QUEtiapine (SEROQUEL) 100 MG tablet Take 1-2 tablets (100-200 mg) by mouth nightly as needed   buprenorphine-naloxone (SUBOXONE) 8-2 MG SUBL sublingual tablet One tablet tid   clonazePAM (KLONOPIN) 1 MG tablet Take 1 tablet (1 mg) by mouth 3 times daily as needed for anxiety   methylfolate 7.5 MG TABS Take 7.5 mg by mouth daily   omeprazole (PRILOSEC) 20 MG CR capsule Take 1 capsule (20 mg) by mouth every morning   naproxen (NAPROSYN) 500 MG tablet Take 1,000 mg by mouth daily   cholecalciferol (VITAMIN D3) 73494 UNITS capsule Take 10,000 Units by mouth daily   fish  oil-omega-3 fatty acids 1000 MG capsule Take 1,000 mg by mouth 4 times daily   multivitamin, therapeutic (THERA-VIT) TABS tablet Take 1 tablet by mouth 2 times daily   Melatonin 10 MG TABS tablet Take 10 mg by mouth At Bedtime   Misc Natural Products (7-KETO LEAN PO) Take 1 tablet by mouth daily   chromium 200 MCG CAPS capsule Take 200 mcg by mouth daily   bisacodyl (DULCOLAX) 5 MG EC tablet Take 10 mg by mouth At Bedtime   ascorbic acid (VITAMIN C) 1000 MG TABS Take 1,000 mg by mouth daily   L-Theanine 100 MG CAPS Take 1 capsule by mouth 2 times daily as needed   Calcium-Magnesium-Zinc 167-83-8 MG TABS Take 1 tablet by mouth every morning   albuterol (PROAIR HFA/PROVENTIL HFA/VENTOLIN HFA) 108 (90 BASE) MCG/ACT Inhaler Inhale 2 puffs into the lungs every 6 hours   sucralfate (CARAFATE) 1 GM tablet Take 1 g by mouth 2 times daily   phenazopyridine (PYRIDIUM) 200 MG tablet Take 1 tablet (200 mg) by mouth 3 times daily as needed for pain   ibuprofen (ADVIL,MOTRIN) 200 MG tablet Take 800 mg by mouth every 6 hours as needed      No current facility-administered medications on file prior to visit.        Allergies   Allergen Reactions     Ceclor [Cefaclor Monohydrate]      Erythromycin      Wellbutrin [Bupropion Hydrobromide]          REVIEW OF SYSTEMS:  General: No acute withdrawal symptoms.  No recent infections or fever  Resp: No coughing, wheezing or shortness of breath  CV: No chest pains or palpitations  GI: No nausea, vomiting, abdominal pain, diarrhea, constipation  : No urinary frequency or dysuria,     Musculoskeletal: No significant muscle or joint pains, No edema  Neurologic: No numbness, tingling, weakness, problems with balance or coordination  Psychiatric: No acute concerns  Skin: No rashes    OBJECTIVE:    PHYSICAL EXAM:  /70  Pulse 63  Temp 98.4  F (36.9  C) (Oral)  Resp 14  Wt 174 lb (78.9 kg)  SpO2 96%  BMI 28.08 kg/m2    GENERAL APPEARANCE:  alert, comfortable appearing  EYES:Eyes  grossly normal to inspection  NEURO:  Gait normal.  No tremor. Coordination intact.   MENTAL STATUS EXAM:  Appearance/Behavior: No appearant distress  Speech: Normal  Mood/Affect: normal affect  Insight: Adequate      Results for orders placed or performed in visit on 03/26/18   Urine Drugs of Abuse Screen Panel 13   Result Value Ref Range    Cannabinoids (51-soh-9-carboxy-9-THC) Not Detected NDET^Not Detected ng/mL    Phencyclidine (Phencyclidine) Not Detected NDET^Not Detected ng/mL    Cocaine (Benzoylecgonine) Not Detected NDET^Not Detected ng/mL    Methamphetamine (d-Methamphetamine) Not Detected NDET^Not Detected ng/mL    Opiates (Morphine) Not Detected NDET^Not Detected ng/mL    Amphetamine (d-Amphetamine) Not Detected NDET^Not Detected ng/mL    Benzodiazepines (Nordiazepam) Not Detected NDET^Not Detected ng/mL    Tricyclic Antidepressants (Desipramine) Not Detected NDET^Not Detected ng/mL    Methadone (Methadone) Not Detected NDET^Not Detected ng/mL    Barbiturates (Butalbital) Not Detected NDET^Not Detected ng/mL    Oxycodone (Oxycodone) Not Detected NDET^Not Detected ng/mL    Propoxyphene (Norpropoxyphene) Not Detected NDET^Not Detected ng/mL    Buprenorphine (Buprenorphine) Detected, Abnormal Result (A) NDET^Not Detected ng/mL           ASSESSMENT/PLAN:        (F11.20) Opioid use disorder, severe, dependence (H)  (primary encounter diagnosis)  Comment: would like to continue Suboxone  Plan: Urine Drugs of Abuse Screen Panel 13      continue  Suboxone  8mg tid.   #90    Follow up 4wk       Encourage meeting attendance and sponsorship to continue -plans for meeting later today.       Opioid warning reviewed.  Risk of overdose following a period of abstinence due to decrease tolerance was discussed including risk of death.   Risk of overdose if using Suboxone with other substances particuarly benzodiazepines/alcohol was reviewed.       Strongly recommended abstain from alcohol, benzodiazepines, THC, opioids  and other drugs of abuse with Suboxone.  Increased risk of relapse for opioids wi      (F31.89) Other bipolar disorder (H)  (F60.3) Borderline personality disorder  (F43.10) PTSD (post-traumatic stress disorder)  Comment: Current Prozac  (does have hx of overuse of gabapentin in past. )  Plan: seeing psychiatry again soon. Feels prozac is working well.    Encouraged theraputic counseling.       (M79.1) Muscle pain  Comment:attempt again to obtain previous records from [place]  Plan: encourage hydration with hx of rhabdomyolysis      (N30.10) Interstitial cystitis  Plan: follow up with PCP.             ENCOUNTER FOR LONG TERM USE OF HIGH RISK MEDICATION   High Risk Drug Monitoring?  YES   Drug being monitored: Buprenorphine   Reason for drug: Opioid Use Disorder   What is being monitored?: Dosage, Cravings, Trigger, side effects, and continued abstinence.      Opioid warning reviewed.  Risk of overdose following a period of abstinence due to decrease tolerance was discussed including risk of death.   Risk of overdose if using Suboxone with other substances particuarly benzodiazepines/alcohol was reviewed.          Maine Fox MD  [facility]  [phone]

## 2018-04-09 ENCOUNTER — TELEPHONE (OUTPATIENT)
Dept: PSYCHIATRY | Facility: CLINIC | Age: 38
End: 2018-04-09

## 2018-04-09 DIAGNOSIS — F19.20 CHEMICAL DEPENDENCY (H): ICD-10-CM

## 2018-04-09 RX ORDER — CLONIDINE HYDROCHLORIDE 0.1 MG/1
.1-.2 TABLET ORAL 3 TIMES DAILY PRN
Qty: 180 TABLET | Refills: 0 | Status: SHIPPED | OUTPATIENT
Start: 2018-04-09 | End: 2018-05-04

## 2018-04-09 NOTE — TELEPHONE ENCOUNTER
med refill  Received: Today       Jhoana Mccauley Katherine J RN       Phone Number: [phone]                     Caller: Pt   Medication:  Clonodine   How many days left of med do you have left (if >3 day supply, redirect to pharmacy): none, pt has been out since Sat   Pharmacy and location: [facility]   Pending appt date:  4/26   Okay to leave detailed VM: Yes

## 2018-04-09 NOTE — TELEPHONE ENCOUNTER
Message  Received: Today       Eran Almaraz MD Blake, Katherine J RN       Caller: Unspecified (Today,  3:35 PM)                     Doug Bryant -     Sure, I think refilling 180 tabs is fine.  It looks like she is allowed to take up to 2 tabs TID, according to her medication list, so she is using it within the approved dosing range.     DB

## 2018-04-09 NOTE — TELEPHONE ENCOUNTER
Last seen: 3/1/18  RTC: 5 weeks  Cancel: none  No-show: none  Next appt: 4/26/18    Incoming refill from pt via phone    Medication requested: Clonidine 0.1 mg tabs  Directions: Take 1-2 tabs TID PRN anxiety  Qty: 60  Last refilled: 3/23/18 per med tab      Called pt to discuss clonidine use.  Pt states she has increased use recently as she is feeling more anxious.  Taking 2 tabs (0.2 mg) BID-TID.  Finds that medication does help calm her.  Denies any side effects from medication other than some mild sedation.  Will forward to provider covering for Dr. Mendes for approval to refill higher qty as pt is taking between 4-6 tabs per day.

## 2018-04-19 ENCOUNTER — OFFICE VISIT (OUTPATIENT)
Dept: ADDICTION MEDICINE | Facility: CLINIC | Age: 38
End: 2018-04-19
Payer: COMMERCIAL

## 2018-04-19 DIAGNOSIS — F60.3 BORDERLINE PERSONALITY DISORDER (H): ICD-10-CM

## 2018-04-19 DIAGNOSIS — F43.10 PTSD (POST-TRAUMATIC STRESS DISORDER): ICD-10-CM

## 2018-04-19 DIAGNOSIS — F19.20 CHEMICAL DEPENDENCY (H): ICD-10-CM

## 2018-04-19 DIAGNOSIS — F31.89 OTHER BIPOLAR DISORDER (H): ICD-10-CM

## 2018-04-19 DIAGNOSIS — N30.10 INTERSTITIAL CYSTITIS: ICD-10-CM

## 2018-04-19 DIAGNOSIS — F11.20 OPIOID USE DISORDER, SEVERE, DEPENDENCE (H): Primary | ICD-10-CM

## 2018-04-19 PROCEDURE — 99215 OFFICE O/P EST HI 40 MIN: CPT | Performed by: PEDIATRICS

## 2018-04-19 PROCEDURE — 80306 DRUG TEST PRSMV INSTRMNT: CPT | Performed by: PEDIATRICS

## 2018-04-19 RX ORDER — BUPRENORPHINE HYDROCHLORIDE AND NALOXONE HYDROCHLORIDE DIHYDRATE 8; 2 MG/1; MG/1
TABLET SUBLINGUAL
Qty: 90 EACH | Refills: 0 | Status: SHIPPED | OUTPATIENT
Start: 2018-04-19 | End: 2018-05-03

## 2018-04-19 RX ORDER — GABAPENTIN 300 MG/1
300 CAPSULE ORAL 3 TIMES DAILY
Qty: 90 CAPSULE | Refills: 0 | Status: SHIPPED | OUTPATIENT
Start: 2018-04-19 | End: 2018-05-03

## 2018-04-19 ASSESSMENT — ANXIETY QUESTIONNAIRES
GAD7 TOTAL SCORE: 13
1. FEELING NERVOUS, ANXIOUS, OR ON EDGE: NEARLY EVERY DAY
IF YOU CHECKED OFF ANY PROBLEMS ON THIS QUESTIONNAIRE, HOW DIFFICULT HAVE THESE PROBLEMS MADE IT FOR YOU TO DO YOUR WORK, TAKE CARE OF THINGS AT HOME, OR GET ALONG WITH OTHER PEOPLE: SOMEWHAT DIFFICULT
4. TROUBLE RELAXING: SEVERAL DAYS
2. NOT BEING ABLE TO STOP OR CONTROL WORRYING: MORE THAN HALF THE DAYS
3. WORRYING TOO MUCH ABOUT DIFFERENT THINGS: MORE THAN HALF THE DAYS
5. BEING SO RESTLESS THAT IT IS HARD TO SIT STILL: MORE THAN HALF THE DAYS
7. FEELING AFRAID AS IF SOMETHING AWFUL MIGHT HAPPEN: NOT AT ALL
6. BECOMING EASILY ANNOYED OR IRRITABLE: NEARLY EVERY DAY

## 2018-04-19 NOTE — MR AVS SNAPSHOT
After Visit Summary   4/19/2018    Yara Edouard    MRN: [id number]           Patient Information     Date Of Birth          1980        Visit Information        Provider Department      4/19/2018 3:40 PM Maine Fox MD [facility]        Today's Diagnoses     Opioid use disorder, severe, dependence (H)    -  1    Other bipolar disorder (H)        PTSD (post-traumatic stress disorder)        Borderline personality disorder        Interstitial cystitis        Chemical dependency (H)           Follow-ups after your visit        Your next 10 appointments already scheduled     Apr 26, 2018  2:45 PM CDT   Schizophrenia Follow Up with Satya Mendes MD   Psychiatry Clinic ([facility])    [place] [id number]  [address] [address] [phone]              Who to contact     If you have questions or need follow up information about today's clinic visit or your schedule please contact [facility] directly at [phone].  Normal or non-critical lab and imaging results will be communicated to you by [employer], letter or phone within 4 business days after the clinic has received the results. If you do not hear from us within 7 days, please contact the clinic through [employer] or phone. If you have a critical or abnormal lab result, we will notify you by phone as soon as possible.  Submit refill requests through [employer] or call your pharmacy and they will forward the refill request to us. Please allow 3 business days for your refill to be completed.          Additional Information About Your Visit        MyChart Information     [employer] gives you secure access to your electronic health record. If you see a primary care provider, you can also send messages to your care team and make appointments. If you have questions, please call your primary care clinic.  If you do not have a primary  care provider, please call [phone] and they will assist you.        Care EveryWhere ID     This is your Care EveryWhere ID. This could be used by other organizations to access your [place] medical records  [id number]         Blood Pressure from Last 3 Encounters:   03/26/18 120/70   03/01/18 123/82   03/01/18 114/76    Weight from Last 3 Encounters:   03/26/18 174 lb (78.9 kg)   03/01/18 169 lb 6.4 oz (76.8 kg)   03/01/18 168 lb (76.2 kg)              We Performed the Following     Urine Drugs of Abuse Screen Panel 13          Today's Medication Changes          These changes are accurate as of 4/19/18 11:59 PM.  If you have any questions, ask your nurse or doctor.               Start taking these medicines.        Dose/Directions    gabapentin 300 MG capsule   Commonly known as:  NEURONTIN   Used for:  Opioid use disorder, severe, dependence (H), Other bipolar disorder (H), PTSD (post-traumatic stress disorder)        Dose:  300 mg   Take 1 capsule (300 mg) by mouth 3 times daily   Quantity:  90 capsule   Refills:  0         Stop taking these medicines if you haven't already. Please contact your care team if you have questions.     clonazePAM 1 MG tablet   Commonly known as:  klonoPIN           multivitamin, therapeutic Tabs tablet           naproxen 500 MG tablet   Commonly known as:  NAPROSYN           phenazopyridine 200 MG tablet   Commonly known as:  PYRIDIUM                Where to get your medicines      These medications were sent to [facility] [address] [place] [address] & [address] [address]     Phone:  [phone]     omeprazole 20 MG CR capsule         Some of these will need a paper prescription and others can be bought over the counter.  Ask your nurse if you have questions.     Bring a paper prescription for each of these medications     buprenorphine-naloxone 8-2 MG Subl sublingual tablet    gabapentin 300 MG  capsule                Primary Care Provider Office Phone # Fax #    Neyda Ori Meza [phone]       [facility] [address]  [facility] [address]        Equal Access to Services     TRISTAN GRULLON : Hadii bibi ku cammyo Soomaali, waaxda luqadaha, qaybta kaalmada adeegyada, desiree randin hayaan jensayesha baca vickey acuna. So [facility] [phone].    ATENCIÓN: Si habla español, tiene a galeana disposición servicios gratuitos de asistencia lingüística. Llame al [phone].    We comply with applicable federal civil rights laws and [place] laws. We do not discriminate on the basis of race, color, national origin, age, disability, sex, sexual orientation, or gender identity.            Thank you!     Thank you for choosing [facility]  for your care. Our goal is always to provide you with excellent care. Hearing back from our patients is one way we can continue to improve our services. Please take a few minutes to complete the written survey that you may receive in the mail after your visit with us. Thank you!             Your Updated Medication List - Protect others around you: Learn how to safely use, store and throw away your medicines at www.disposemymeds.org.          This list is accurate as of 4/19/18 11:59 PM.  Always use your most recent med list.                   Brand Name Dispense Instructions for use Diagnosis    7-KETO LEAN PO      Take 1 tablet by mouth daily        albuterol 108 (90 Base) MCG/ACT Inhaler    PROAIR HFA/PROVENTIL HFA/VENTOLIN HFA     Inhale 2 puffs into the lungs every 6 hours        ascorbic acid 1000 MG Tabs    vitamin C     Take 1,000 mg by mouth daily        buprenorphine-naloxone 8-2 MG Subl sublingual tablet    SUBOXONE    90 each    One tablet tid    Opioid use disorder, severe, dependence (H)       Calcium-Magnesium-Zinc 167-83-8 MG Tabs      Take 1 tablet by mouth every morning        cholecalciferol 14927 units  capsule    vitamin D3     Take 10,000 Units by mouth daily        chromium 200 MCG Caps capsule      Take 200 mcg by mouth daily        cloNIDine 0.1 MG tablet    CATAPRES    180 tablet    Take 1-2 tablets (0.1-0.2 mg) by mouth 3 times daily as needed (anxiety)    Chemical dependency (H)       DEPLIN 7.5 MG Tabs     30 tablet    Take 7.5 mg by mouth daily        DULCOLAX 5 MG EC tablet   Generic drug:  bisacodyl      Take 10 mg by mouth At Bedtime        fish oil-omega-3 fatty acids 1000 MG capsule      Take 1,000 mg by mouth 4 times daily        FLUoxetine 40 MG capsule    PROzac    30 capsule    Take 1 capsule (40 mg) by mouth daily    Recurrent major depressive disorder, in partial remission (H)       gabapentin 300 MG capsule    NEURONTIN    90 capsule    Take 1 capsule (300 mg) by mouth 3 times daily    Opioid use disorder, severe, dependence (H), Other bipolar disorder (H), PTSD (post-traumatic stress disorder)       ibuprofen 200 MG tablet    ADVIL/MOTRIN     Take 800 mg by mouth every 6 hours as needed        L-Theanine 100 MG Caps      Take 1 capsule by mouth 2 times daily as needed        * lamoTRIgine 150 MG tablet    LaMICtal    30 tablet    Take 1 tablet (150 mg) by mouth daily along with 200 mg tab at night    Other bipolar disorder (H)       * lamoTRIgine 200 MG tablet    LaMICtal    30 tablet    Take 1 tablet (200 mg) by mouth At Bedtime with 150 mg every morning    PTSD (post-traumatic stress disorder)       Melatonin 10 MG Tabs tablet      Take 10 mg by mouth At Bedtime        omeprazole 20 MG CR capsule    priLOSEC    90 capsule    Take 1 capsule (20 mg) by mouth every morning    Chemical dependency (H)       propranolol 40 MG tablet    INDERAL    100 tablet    Take 1 tablet (40 mg) by mouth 4 times daily as needed for tremor    PTSD (post-traumatic stress disorder)       QUEtiapine 100 MG tablet    SEROquel    60 tablet    Take 1-2 tablets (100-200 mg) by mouth nightly as needed    PTSD  (post-traumatic stress disorder)       sucralfate 1 GM tablet    CARAFATE     Take 1 g by mouth 2 times daily        * Notice:  This list has 2 medication(s) that are the same as other medications prescribed for you. Read the directions carefully, and ask your doctor or other care provider to review them with you.

## 2018-04-19 NOTE — PROGRESS NOTES
SUBJECTIVE:                                                    OPIOID USE DISORDER - BUPRENORPHINE FOLLOW UP:    Yara Edouard is a 37 year old female who presents to clinic today for follow up of  MAT for opioid use disorder.       Date of last visit:  3/26/2018    [place] Board of Pharmacy Data Base Reviewed:    YES;     No Concerns Noted   4/20/2018      Brief History:  Initial visit 1/16/18 Opioid for many years.  3 yr methadone MAT then six months Suboxone MAT.  Started Heroin age 29.  Hx of kratom use more recently as well as use of large amounts of tramadol and gabapentin.  Detox 1/4/18-1/9/18. Many previous treatments.      HPI:  Working at [employer] and really liking it.  Despite that going well is struggling with depression seems somewhat worse and anxiety still high.  Does not feel Prozac is working at all.  Still taking.  Lamictal continues at previous dose.   Did order some gabapentin from the Internet and started taking reported dose of 300mg tid. .  Started due to high anxiety to avoid using other substances. Hx of using high dose reportedly when trying to withdrawal from other substances.   Currently taking 300mg tid for the past few weeks.   Sees Dr. Mendes for Psychiatry.  Has appt next week.    Only uses propranalol for shakes but doesn't really help with anxiety.  Seroquel at hs only used rarely.  Still occasionally taking Clonidine.  Avoids taking clonidine and Propranolol at same time.   Limited other recovery participation.            Status since last visit: Since last visit patient has been:struggling    Intensity:     There has been: mild intermittent craving    Suboxone Dose: adequate    Progression of Symptoms/Precipitating Factors:     Cues to use and relapse triggers: Anxiety and depression    Trigger have been:  moderate    Accompanying Signs & Symptoms:    Side Effects: none    Sobriety:     Status: No substance use     Drug Screen: obtained    Alleviating factors/Other Therapies  Tried:    Contact with sponsor has been: no sponsor    Family and support system has been: neutral    Patient has been going to recovery meetings: sporadically    Recovery program has been : sporadic    Patient has been participating in professional counseling /therapy: NO        Social History     Social History Narrative    Living on own with two dogs (Zeny and Oscar).  In school (leave of absence).  [employer] for [employer] [employer]).         Patient Active Problem List    Diagnosis Date Noted     Interstitial cystitis 01/16/2018     Priority: Medium     Muscle pain 01/16/2018     Priority: Medium     Diagnosed with musculoskelatal disorder NOS  (hx of episodes of rhabdomyolysis).         Gastritis 01/16/2018     Priority: Medium     Hx of nausea , abd pain.  Follow up endoscopsy.          PTSD (post-traumatic stress disorder) 10/15/2014     Priority: Medium     Borderline personality disorder 10/15/2014     Priority: Medium     Other bipolar disorder (H) 11/19/2013     Priority: Medium     Diagnosis updated by automated process. Provider to review and confirm.       Chemical dependency (H) 11/03/2013     Priority: Medium     Drug overdose 10/30/2013     Priority: Medium       Problem list and histories reviewed & adjusted, as indicated.  Additional history: as documented        Current Outpatient Prescriptions on File Prior to Visit:  albuterol (PROAIR HFA/PROVENTIL HFA/VENTOLIN HFA) 108 (90 BASE) MCG/ACT Inhaler Inhale 2 puffs into the lungs every 6 hours   ascorbic acid (VITAMIN C) 1000 MG TABS Take 1,000 mg by mouth daily   bisacodyl (DULCOLAX) 5 MG EC tablet Take 10 mg by mouth At Bedtime   buprenorphine-naloxone (SUBOXONE) 8-2 MG SUBL sublingual tablet One tablet tid   Calcium-Magnesium-Zinc 167-83-8 MG TABS Take 1 tablet by mouth every morning   cholecalciferol (VITAMIN D3) 07175 UNITS capsule Take 10,000 Units by mouth daily   chromium 200 MCG CAPS capsule Take 200 mcg by mouth daily   clonazePAM (KLONOPIN) 1 MG  tablet Take 1 tablet (1 mg) by mouth 3 times daily as needed for anxiety   cloNIDine (CATAPRES) 0.1 MG tablet Take 1-2 tablets (0.1-0.2 mg) by mouth 3 times daily as needed (anxiety)   fish oil-omega-3 fatty acids 1000 MG capsule Take 1,000 mg by mouth 4 times daily   FLUoxetine (PROZAC) 40 MG capsule Take 1 capsule (40 mg) by mouth daily   ibuprofen (ADVIL,MOTRIN) 200 MG tablet Take 800 mg by mouth every 6 hours as needed    L-Theanine 100 MG CAPS Take 1 capsule by mouth 2 times daily as needed   lamoTRIgine (LAMICTAL) 150 MG tablet Take 1 tablet (150 mg) by mouth daily along with 200 mg tab at night   lamoTRIgine (LAMICTAL) 200 MG tablet Take 1 tablet (200 mg) by mouth At Bedtime with 150 mg every morning   Melatonin 10 MG TABS tablet Take 10 mg by mouth At Bedtime   methylfolate 7.5 MG TABS Take 7.5 mg by mouth daily   Misc Natural Products (7-KETO LEAN PO) Take 1 tablet by mouth daily   multivitamin, therapeutic (THERA-VIT) TABS tablet Take 1 tablet by mouth 2 times daily   naproxen (NAPROSYN) 500 MG tablet Take 1,000 mg by mouth daily   omeprazole (PRILOSEC) 20 MG CR capsule Take 1 capsule (20 mg) by mouth every morning   phenazopyridine (PYRIDIUM) 200 MG tablet Take 1 tablet (200 mg) by mouth 3 times daily as needed for pain   propranolol (INDERAL) 40 MG tablet Take 1 tablet (40 mg) by mouth 4 times daily as needed for tremor   QUEtiapine (SEROQUEL) 100 MG tablet Take 1-2 tablets (100-200 mg) by mouth nightly as needed   sucralfate (CARAFATE) 1 GM tablet Take 1 g by mouth 2 times daily     No current facility-administered medications on file prior to visit.        Allergies   Allergen Reactions     Ceclor [Cefaclor Monohydrate]      Erythromycin      Wellbutrin [Bupropion Hydrobromide]          REVIEW OF SYSTEMS:  General: No acute withdrawal symptoms.  No recent infections or fever  Resp: No coughing, wheezing or shortness of breath  CV: No chest pains or palpitations  GI: No nausea, vomiting, abdominal pain,  diarrhea, constipation  : No urinary frequency or dysuria,     Musculoskeletal: No significant muscle or joint pains, No edema  Neurologic: No numbness, tingling, weakness, problems with balance or coordination  Psychiatric: No acute concerns  Skin: No rashes    OBJECTIVE:    PHYSICAL EXAM:  There were no vitals taken for this visit.    GENERAL APPEARANCE:  alert, comfortable appearing  EYES:Eyes grossly normal to inspection  NEURO:  Gait normal.  No tremor. Coordination intact.   MENTAL STATUS EXAM:  Appearance/Behavior: No appearant distress  Speech: Normal  Mood/Affect: normal affect  Insight: Adequate      Results for orders placed or performed in visit on 04/19/18   Urine Drugs of Abuse Screen Panel 13   Result Value Ref Range    Cannabinoids (49-hoe-5-carboxy-9-THC) Not Detected NDET^Not Detected ng/mL    Phencyclidine (Phencyclidine) Not Detected NDET^Not Detected ng/mL    Cocaine (Benzoylecgonine) Not Detected NDET^Not Detected ng/mL    Methamphetamine (d-Methamphetamine) Not Detected NDET^Not Detected ng/mL    Opiates (Morphine) Not Detected NDET^Not Detected ng/mL    Amphetamine (d-Amphetamine) Not Detected NDET^Not Detected ng/mL    Benzodiazepines (Nordiazepam) Not Detected NDET^Not Detected ng/mL    Tricyclic Antidepressants (Desipramine) Not Detected NDET^Not Detected ng/mL    Methadone (Methadone) Not Detected NDET^Not Detected ng/mL    Barbiturates (Butalbital) Not Detected NDET^Not Detected ng/mL    Oxycodone (Oxycodone) Not Detected NDET^Not Detected ng/mL    Propoxyphene (Norpropoxyphene) Not Detected NDET^Not Detected ng/mL    Buprenorphine (Buprenorphine) Detected, Abnormal Result (A) NDET^Not Detected ng/mL           ASSESSMENT/PLAN:    (F11.20) Opioid use disorder, severe, dependence (H)  (primary encounter diagnosis)  Comment: would like to continue Suboxone  Plan: Urine Drugs of Abuse Screen Panel 13      continue  Suboxone  8mg tid.   #90     Follow up 4wk       Encourage meeting  attendance and sponsorship to continue -plans for meeting later today.       Opioid warning reviewed.  Risk of overdose following a period of abstinence due to decrease tolerance was discussed including risk of death.   Risk of overdose if using Suboxone with other substances particuarly benzodiazepines/alcohol was reviewed.       Strongly recommended abstain from alcohol, benzodiazepines, THC, opioids and other drugs of abuse with Suboxone.  Increased risk of relapse for opioids with other substance use.        (F31.89) Other bipolar disorder (H)  (F60.3) Borderline personality disorder  (F43.10) PTSD (post-traumatic stress disorder)  Comment: Current Prozac  (does have hx of overuse of gabapentin in past. )  Plan: seeing psychiatry again soon. Feels prozac is not working well.  Will  message psychiatry about today's visit and her medication concerns.  Will briefly rx Gabapentin at 300 mg tid for one month pending follow up.  Advised to not use any  product over internet and no other non presribed substances or further self medicating.  Risk of this including risk of overdose and side effects of medication used.    Encouraged theraputic counseling.       (M79.1) Muscle pain  Comment:attempt again to obtain previous records from [place]  Plan: encourage hydration with hx of rhabdomyolysis      (N30.10) Interstitial cystitis  Plan: follow up with PCP      ENCOUNTER FOR LONG TERM USE OF HIGH RISK MEDICATION   High Risk Drug Monitoring?  YES   Drug being monitored: Buprenorphine   Reason for drug: Opioid Use Disorder   What is being monitored?: Dosage, Cravings, Trigger, side effects, and continued abstinence.      Opioid warning reviewed.  Risk of overdose following a period of abstinence due to decrease tolerance was discussed including risk of death.   Risk of overdose if using Suboxone with other substances particuarly benzodiazepines/alcohol was reviewed.          Maine Fox MD  [facility]  Addiction Medicine  [phone]

## 2018-04-20 ASSESSMENT — ANXIETY QUESTIONNAIRES: GAD7 TOTAL SCORE: 13

## 2018-04-26 DIAGNOSIS — F33.41 RECURRENT MAJOR DEPRESSIVE DISORDER, IN PARTIAL REMISSION (H): ICD-10-CM

## 2018-04-27 RX ORDER — FLUOXETINE 40 MG/1
40 CAPSULE ORAL DAILY
Qty: 10 CAPSULE | Refills: 0 | Status: SHIPPED | OUTPATIENT
Start: 2018-04-27 | End: 2018-05-03

## 2018-04-27 NOTE — TELEPHONE ENCOUNTER
Medication requested: Fluoxetine 40 mg caps  Last refilled: 3-25-18  Qty: 30      Last seen: 3-1-18  RTC: 5 wks  Cancel: 1  No-show: 0  Next appt: none    Refill decision:   Refill pended and routed to the provider for review/determination due to cancellation x1 and no scheduled appointment.    Scheduling has been notified to contact the pt for appointment.      Kathleen M Doege RN

## 2018-05-03 ENCOUNTER — OFFICE VISIT (OUTPATIENT)
Dept: PSYCHIATRY | Facility: CLINIC | Age: 38
End: 2018-05-03
Attending: PSYCHIATRY & NEUROLOGY
Payer: COMMERCIAL

## 2018-05-03 VITALS
WEIGHT: 174.4 LBS | HEART RATE: 65 BPM | SYSTOLIC BLOOD PRESSURE: 148 MMHG | BODY MASS INDEX: 28.15 KG/M2 | DIASTOLIC BLOOD PRESSURE: 97 MMHG

## 2018-05-03 DIAGNOSIS — F31.89 OTHER BIPOLAR DISORDER (H): ICD-10-CM

## 2018-05-03 DIAGNOSIS — F43.10 PTSD (POST-TRAUMATIC STRESS DISORDER): Primary | ICD-10-CM

## 2018-05-03 DIAGNOSIS — F11.20 OPIOID USE DISORDER, SEVERE, DEPENDENCE (H): ICD-10-CM

## 2018-05-03 DIAGNOSIS — F33.41 RECURRENT MAJOR DEPRESSIVE DISORDER, IN PARTIAL REMISSION (H): ICD-10-CM

## 2018-05-03 DIAGNOSIS — F43.10 PTSD (POST-TRAUMATIC STRESS DISORDER): ICD-10-CM

## 2018-05-03 PROCEDURE — G0463 HOSPITAL OUTPT CLINIC VISIT: HCPCS | Mod: ZF

## 2018-05-03 RX ORDER — LAMOTRIGINE 200 MG/1
200 TABLET ORAL AT BEDTIME
Qty: 30 TABLET | Refills: 3 | Status: SHIPPED | OUTPATIENT
Start: 2018-05-03 | End: 2018-06-07

## 2018-05-03 RX ORDER — BUPROPION HYDROCHLORIDE 150 MG/1
150 TABLET ORAL EVERY MORNING
Qty: 60 TABLET | Refills: 3 | Status: SHIPPED | OUTPATIENT
Start: 2018-05-03 | End: 2018-05-14

## 2018-05-03 RX ORDER — BUPRENORPHINE HYDROCHLORIDE AND NALOXONE HYDROCHLORIDE DIHYDRATE 8; 2 MG/1; MG/1
TABLET SUBLINGUAL
Qty: 18 EACH | Refills: 0 | Status: SHIPPED | OUTPATIENT
Start: 2018-05-03 | End: 2018-05-17

## 2018-05-03 RX ORDER — LAMOTRIGINE 150 MG/1
150 TABLET ORAL DAILY
Qty: 30 TABLET | Refills: 3 | Status: SHIPPED | OUTPATIENT
Start: 2018-05-03 | End: 2018-06-07

## 2018-05-03 RX ORDER — FLUOXETINE 40 MG/1
40 CAPSULE ORAL DAILY
Qty: 30 CAPSULE | Refills: 1 | Status: SHIPPED | OUTPATIENT
Start: 2018-05-03 | End: 2018-07-02

## 2018-05-03 RX ORDER — GABAPENTIN 300 MG/1
300 CAPSULE ORAL 3 TIMES DAILY
Qty: 90 CAPSULE | Refills: 0 | Status: SHIPPED | OUTPATIENT
Start: 2018-05-03 | End: 2018-07-02

## 2018-05-03 ASSESSMENT — PAIN SCALES - GENERAL: PAINLEVEL: NO PAIN (0)

## 2018-05-03 NOTE — MR AVS SNAPSHOT
After Visit Summary   5/3/2018    Yara Edouard    MRN: [id number]           Patient Information     Date Of Birth          1980        Visit Information        Provider Department      5/3/2018 1:15 PM Satya Mendes MD [facility]        Today's Diagnoses     PTSD (post-traumatic stress disorder)    -  1    Other bipolar disorder (H)        Recurrent major depressive disorder, in partial remission (H)          Care Instructions    Start taking Wellbutrin, increase to 300 mg in 5 days        Thank you for coming to the PSYCHIATRY CLINIC.    Lab Testing:  If you had lab testing today and your results are reassuring or normal they will be mailed to you or sent through [employer] within 7 days.   If the lab tests need quick action we will call you with the results.  The phone number we will call with results is # [phone] (home) . If this is not the best number please call our clinic and change the number.    Medication Refills:  If you need any refills please call your pharmacy and they will contact us. Our fax number for refills is [phone]. Please allow three business for refill processing.   If you need to  your refill at a new pharmacy, please contact the new pharmacy directly. The new pharmacy will help you get your medications transferred.     Scheduling:  If you have any concerns about today's visit or wish to schedule another appointment please call our office during normal business hours [phone] (8-5:00 M-F)    Contact Us:  Please call [phone] during business hours (8-5:00 M-F).  If after clinic hours, or on the weekend, please call  [phone].    Financial Assistance [phone]  [employer] Billing [phone]  [place] Billing [phone]  Medical Records [phone]      MENTAL HEALTH CRISIS NUMBERS:  [facility]:   [facility] - [phone]   Crisis Residence [facility] - [phone]   Walk-In Counseling  [facility] - [phone]   COPE 24/7 [place] Mobile Team for Adults - [[phone]]; Child - [[phone]]     Crisis Connection - [phone]     [facility]:   [facility] - [phone]   Walk-in counseling [facility] - [phone]   Walk-in counseling [facility] - [phone]   Crisis Residence [facility] - [phone]   Urgent Care Adult Mental Health:   --Drop-in, 24/7 crisis line, and [place] Mobile Team [[phone]]    CRISIS TEXT LINE: Text [phone] from anywhere, anytime, any crisis 24/7;    OR SEE www.crisistextline.org     Poison Control Center - [phone]    CHILD: [facility] needs assessment team - [phone]     [facility] Lifeline - [phone]; or [employer] Lifeline - [phone]    If you have a medical emergency please call 911or go to the nearest ER.                    _____________________________________________    Again thank you for choosing PSYCHIATRY CLINIC and please let us know how we can best partner with you to improve you and your family's health.  You may be receiving a survey in the mail regarding this appointment. We would love to have your feedback, both positive and negative, so please fill out the survey and return it using the provided envolpe. The survey is done by an external company, so your answers are anonymous.           Follow-ups after your visit        Follow-up notes from your care team     Return in about 6 weeks (around 6/14/2018).      Your next 10 appointments already scheduled     Jun 07, 2018  1:20 PM CDT   Return Visit with Maine Fox MD   [facility] ([facility]    [address]   [address]  [facility] [address] [phone]            Jun 07, 2018  2:45 PM CDT   Schizophrenia Follow Up with Satya Mendes MD   Psychiatry Clinic ([facility])    [address]  [address]  [address] [id number] [phone]              Who to contact     Please call your clinic at [phone] to:    Ask questions about your health    Make or cancel appointments    Discuss your medicines    Learn about your test results    Speak to your doctor            Additional Information About Your Visit        MyChart Information     [email or web address] gives you secure access to your electronic health record. If you see a primary care provider, you can also send messages to your care team and make appointments. If you have questions, please call your primary care clinic.  If you do not have a primary care provider, please call [phone] and they will assist you.      [employer] is an electronic gateway that provides easy, online access to your medical records. With [employer], you can request a clinic appointment, read your test results, renew a prescription or communicate with your care team.     To access your existing account, please contact your [facility] or call [phone] for assistance.        Care EveryWhere ID     This is your Care EveryWhere ID. This could be used by other organizations to access your [place] medical records  [id number]        Your Vitals Were     Pulse BMI (Body Mass Index)                65 28.15 kg/m2           Blood Pressure from Last 3 Encounters:   05/03/18 (!) 148/97   03/26/18 120/70   03/01/18 123/82    Weight from Last 3 Encounters:   05/03/18 79.1 kg (174 lb 6.4 oz)   03/26/18 78.9 kg (174 lb)   03/01/18 76.8 kg (169 lb 6.4 oz)              Today, you had the following     No orders found for display         Today's Medication Changes          These changes are accurate as of 5/3/18 11:59 PM.  If you have any questions, ask your nurse or doctor.               Start taking these medicines.        Dose/Directions    buPROPion 150 MG 24 hr tablet   Commonly known as:  WELLBUTRIN XL   Used for:  Other bipolar disorder  (H), PTSD (post-traumatic stress disorder)   Started by:  Satya Mendes MD        Dose:  150 mg   Take 1 tablet (150 mg) by mouth every morning for 5 days, then increase to 300 mg QAM   Quantity:  60 tablet   Refills:  3            Where to get your medicines      These medications were sent to [facility] [address] [facility] [address] [place] [address] AT [address] & [facility]  [address] [facility] [address]     Phone:  [phone]     gabapentin 300 MG capsule         These [facility] [address] [place] [address] [address] AT [facility] OF [address]  [address] [facility] [address]     Phone:  [phone]     buPROPion 150 MG 24 hr tablet    FLUoxetine 40 MG capsule    lamoTRIgine 150 MG tablet    lamoTRIgine 200 MG tablet         Some of these will need a paper prescription and others can be bought over the counter.  Ask your nurse if you have questions.     Bring a paper prescription for each of these medications     buprenorphine-naloxone 8-2 MG Subl sublingual tablet                Primary Care Provider Office Phone # Fax #    Mancel Ori Meza [phone]       [facility] [address]  [facility] [address]        Equal Access to Services     [facility] CenterALEXANDRE AH: Hadii aad ku hadasho Soomaali, waaxda luqadaha, qaybta kaalmada adeegyada, desiree acuna. So [facility] [phone].    ATENCIÓN: Si habla español, tiene a galeana disposición servicios gratuitos de asistencia lingüística. Jese al [phone].    We comply with applicable federal civil rights laws and [place] laws. We do not discriminate on the basis of race, color, national origin, age, disability, sex, sexual orientation, or gender identity.            Thank you!     Thank you for choosing PS[facility]INIC  for your care. Our goal is always to provide you with excellent care. Hearing back from our  patients is one way we can continue to improve our services. Please take a few minutes to complete the written survey that you may receive in the mail after your visit with us. Thank you!             Your Updated Medication List - Protect others around you: Learn how to safely use, store and throw away your medicines at www.disposemymeds.org.          This list is accurate as of 5/3/18 11:59 PM.  Always use your most recent med list.                   Brand Name Dispense Instructions for use Diagnosis    7-KETO LEAN PO      Take 1 tablet by mouth daily        albuterol 108 (90 Base) MCG/ACT Inhaler    PROAIR HFA/PROVENTIL HFA/VENTOLIN HFA     Inhale 2 puffs into the lungs every 6 hours        ascorbic acid 1000 MG Tabs    vitamin C     Take 1,000 mg by mouth daily        buprenorphine-naloxone 8-2 MG Subl sublingual tablet    SUBOXONE    18 each    One tablet tid    Opioid use disorder, severe, dependence (H)       buPROPion 150 MG 24 hr tablet    WELLBUTRIN XL    60 tablet    Take 1 tablet (150 mg) by mouth every morning for 5 days, then increase to 300 mg QAM    Other bipolar disorder (H), PTSD (post-traumatic stress disorder)       Calcium-Magnesium-Zinc 167-83-8 MG Tabs      Take 1 tablet by mouth every morning        cholecalciferol 28833 units capsule    vitamin D3     Take 10,000 Units by mouth daily        chromium 200 MCG Caps capsule      Take 200 mcg by mouth daily        DEPLIN 7.5 MG Tabs     30 tablet    Take 7.5 mg by mouth daily        DULCOLAX 5 MG EC tablet   Generic drug:  bisacodyl      Take 10 mg by mouth At Bedtime        fish oil-omega-3 fatty acids 1000 MG capsule      Take 1,000 mg by mouth 4 times daily        FLUoxetine 40 MG capsule    PROzac    30 capsule    Take 1 capsule (40 mg) by mouth daily    Recurrent major depressive disorder, in partial remission (H)       gabapentin 300 MG capsule    NEURONTIN    90 capsule    Take 1 capsule (300 mg) by mouth 3 times daily    Opioid use  disorder, severe, dependence (H), Other bipolar disorder (H), PTSD (post-traumatic stress disorder)       ibuprofen 200 MG tablet    ADVIL/MOTRIN     Take 800 mg by mouth every 6 hours as needed        L-Theanine 100 MG Caps      Take 1 capsule by mouth 2 times daily as needed        * lamoTRIgine 150 MG tablet    LaMICtal    30 tablet    Take 1 tablet (150 mg) by mouth daily along with 200 mg tab at night    Other bipolar disorder (H)       * lamoTRIgine 200 MG tablet    LaMICtal    30 tablet    Take 1 tablet (200 mg) by mouth At Bedtime with 150 mg every morning    PTSD (post-traumatic stress disorder)       Melatonin 10 MG Tabs tablet      Take 10 mg by mouth At Bedtime        omeprazole 20 MG CR capsule    priLOSEC    90 capsule    Take 1 capsule (20 mg) by mouth every morning    Chemical dependency (H)       propranolol 40 MG tablet    INDERAL    100 tablet    Take 1 tablet (40 mg) by mouth 4 times daily as needed for tremor    PTSD (post-traumatic stress disorder)       QUEtiapine 100 MG tablet    SEROquel    60 tablet    Take 1-2 tablets (100-200 mg) by mouth nightly as needed    PTSD (post-traumatic stress disorder)       sucralfate 1 GM tablet    CARAFATE     Take 1 g by mouth 2 times daily        * Notice:  This list has 2 medication(s) that are the same as other medications prescribed for you. Read the directions carefully, and ask your doctor or other care provider to review them with you.
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
## 2018-05-03 NOTE — TELEPHONE ENCOUNTER
Note that this is a bridge request.    buprenorphine-naloxone (SUBOXONE) 8-2 MG SUBL sublingual tablet  Controlled Substance Refill Request    Last refill: 4/19/18, , 90 for 30 days     Last clinic visit: 4/19/18     Next appt: 5/21/18    Documentation in problem list reviewed:  Yes    Processing:  call/fax    RX monitoring program ([place]MP) reviewed:  reviewed- no concerns  [place]:  [email or web address]     gabapentin (NEURONTIN) 300 MG capsule  Controlled Substance Refill Request    Last refill: 4/19/18, , 90 for 30 days     Last clinic visit: 4/19/18     Next appt: 5/21/18    Documentation in problem list reviewed:  Yes    Processing:  [place]    RX monitoring program ([place]) reviewed:  reviewed- no concerns  [place]MP profile:  [email or web address]      Juan Luis Juarez RN

## 2018-05-03 NOTE — PATIENT INSTRUCTIONS
Start taking Wellbutrin, increase to 300 mg in 5 days        Thank you for coming to the PSYCHIATRY CLINIC.    Lab Testing:  If you had lab testing today and your results are reassuring or normal they will be mailed to you or sent through [employer] within 7 days.   If the lab tests need quick action we will call you with the results.  The phone number we will call with results is # [phone] (home) . If this is not the best number please call our clinic and change the number.    Medication Refills:  If you need any refills please call your pharmacy and they will contact us. Our fax number for refills is [phone]. Please allow three business for refill processing.   If you need to  your refill at a new pharmacy, please contact the new pharmacy directly. The new pharmacy will help you get your medications transferred.     Scheduling:  If you have any concerns about today's visit or wish to schedule another appointment please call our office during normal business hours [phone] (8-5:00 M-F)    Contact Us:  Please call [phone] during business hours (8-5:00 M-F).  If after clinic hours, or on the weekend, please call  [phone].    Financial Assistance [phone]  [facility] Billing [phone]  [facility]illing [phone]  Medical Records [phone]      MENTAL HEALTH CRISIS NUMBERS:  [facility]:   [facility] - [phone]   Crisis Residence [facility] - [phone]   Walk-In Counseling [facility] [phone]   COPE 24/7 [facility] Mobile Team for Adults - [[phone]]; Child - [[phone]]     Crisis Connection - [phone]     [facility]:   [facility] - [phone]   Walk-in counseling [facility] - [phone]   Walk-in counseling [facility] - [phone]   Crisis Residence [facility] - [phone]   Urgent Care Adult Mental Health:   --Drop-in, 24/7 crisis line,  and [facility] Mobile Team [[phone]]    CRISIS TEXT LINE: Text [phone] from anywhere, anytime, any crisis 24/7;    OR SEE www.crisistextline.org     Poison Control Center - [phone]    CHILD: [facility] needs assessment team - [phone]     [facility] Lifeline - 1-725.520.3887; or [facility] Lifeline - 1-178.270.5095    If you have a medical emergency please call 911or go to the nearest ER.                    _____________________________________________    Again thank you for choosing PSYCHIATRY CLINIC and please let us know how we can best partner with you to improve you and your family's health.  You may be receiving a survey in the mail regarding this appointment. We would love to have your feedback, both positive and negative, so please fill out the survey and return it using the provided envolpe. The survey is done by an external company, so your answers are anonymous.

## 2018-05-03 NOTE — TELEPHONE ENCOUNTER
Reason for Call:  Medication or medication refill:    Do you use a [facility]?  Name of the pharmacy and phone number for the current request:  [facility] [place] tel: [phone]    Name of the medication requested: Subx Bridge and Gabapentin Refill    Other request: Pt. Will run out of Subx on the 5/16/18. Her next appt. 5/21/18. Appointment times were available the week of 5/14/18 but times didn't work for pt.     Can we leave a detailed message on this number? YES    Phone number patient can be reached at:  Home phone: [phone]      Best Time: Anytime    Call taken on 5/3/2018 at 2:58 PM by Danielle Valdez

## 2018-05-04 DIAGNOSIS — F19.20 CHEMICAL DEPENDENCY (H): ICD-10-CM

## 2018-05-04 RX ORDER — CLONIDINE HYDROCHLORIDE 0.1 MG/1
.1-.2 TABLET ORAL 3 TIMES DAILY PRN
Qty: 180 TABLET | Refills: 0 | Status: SHIPPED | OUTPATIENT
Start: 2018-05-04 | End: 2018-06-05

## 2018-05-04 NOTE — TELEPHONE ENCOUNTER
Medication requested: clonidine 0.1 mg tabs  Last refilled: 4-9-18  Qty: 180      Last seen: 5-3-18  RTC: 5 wks  Cancel: 0  No-show: 0  Next appt: 6-7-18    Refill decision:   Refill pended and routed to the provider for review/determination due to last clinic note not yet signed.      Kathleen M Doege RN

## 2018-05-07 ENCOUNTER — TELEPHONE (OUTPATIENT)
Dept: PSYCHIATRY | Facility: CLINIC | Age: 38
End: 2018-05-07

## 2018-05-07 DIAGNOSIS — F31.89 OTHER BIPOLAR DISORDER (H): Primary | ICD-10-CM

## 2018-05-07 NOTE — TELEPHONE ENCOUNTER
Appt history:  Last seen:  5/3/18  RTC:  6 weeks  Cancel:  none  No-show:  none  Next appt:  6/7/18    At last appt:  Note unsigned but per AVS:   Start taking Wellbutrin, increase to 300 mg in 5 days       Returned call to pt:  -No answer at number provided  -LVM sharing that writer would forward concerns to Dr. Mendes  -Explained that 150 mg is lowest dose available as Wellbutrin XL  -Will see if Dr. Mendes would like to switch to either Wellbutrin IR or SR at lower dose  -Will call pt back with response

## 2018-05-07 NOTE — TELEPHONE ENCOUNTER
----- Message from Lizbeth More sent at 5/7/2018  2:50 PM CDT -----  Regarding: side effects/  Contact: [phone]  Caller:  Yara  Medication:  Wellbutrin 150 mg ER/would like to decrease to 75 mg  Pharmacy and location:  [facility]  Symptoms: Dizziness/Light Headed/Sweating  Symptom onset: First day of taking the medication  Pending apt: Yes  Okay to leave VM:  Yes    Thanks!

## 2018-05-08 RX ORDER — BUPROPION HYDROCHLORIDE 100 MG/1
100 TABLET, EXTENDED RELEASE ORAL EVERY MORNING
Qty: 6 TABLET | Refills: 0 | Status: SHIPPED | OUTPATIENT
Start: 2018-05-08 | End: 2018-05-14

## 2018-05-08 NOTE — TELEPHONE ENCOUNTER
LVM for pt with medication instructions from Dr. Mendes.  Discuss that if pt has already taken Wellbutrin this morning she should wait until tomorrow AM to start Wellbutrin SR.  New rx sent to pharmacy.  Clinic number provided for c/b if pt does not tolerate Wellbutrin SR or if she has any questions.

## 2018-05-08 NOTE — TELEPHONE ENCOUNTER
Per Dr. Mendes:   Send eRx for bupropion  mg #6, Si tablet every morning. If she can tolerate that, then go back to the 150 XL and try again.     sco

## 2018-05-14 ENCOUNTER — TELEPHONE (OUTPATIENT)
Dept: PSYCHIATRY | Facility: CLINIC | Age: 38
End: 2018-05-14

## 2018-05-14 DIAGNOSIS — F31.89 OTHER BIPOLAR DISORDER (H): ICD-10-CM

## 2018-05-14 RX ORDER — BUPROPION HYDROCHLORIDE 100 MG/1
TABLET, EXTENDED RELEASE ORAL
Qty: 60 TABLET | Refills: 0 | Status: SHIPPED | OUTPATIENT
Start: 2018-05-14 | End: 2018-06-07

## 2018-05-14 NOTE — TELEPHONE ENCOUNTER
Per Dr. Mendes:   Yes, if tolerating OK for a week, can increase to 100 mg BID, 2nd dose at least 6 hours later but not after 4PM or may interfere with sleep. Can fill #60 tabs

## 2018-05-14 NOTE — TELEPHONE ENCOUNTER
Relayed dosing instructions to pt.  Shared that she may need to take another day or two of 100 mg qam and if tolerating may increase to 100 mg BID.  Explained that second dose should be taken at least 6 hours later and before 4 pm so not to interfere with sleep.  Pt states understanding and is aware that new rx was sent to her pharmacy already.

## 2018-05-14 NOTE — TELEPHONE ENCOUNTER
"Incoming call from pt who reports that she is doing \"a lot better\" on the Wellbutrin  mg daily.  Is not having any side effects she previously had with XL.  Denies any side effects at this time.  For the next 4 days will be attending an all day corporate training and is hesitant to switch back to XL.  Pt asks if she could continue on the SR version and possibly increase the dose.  Either way pt has only 1 pill remaining so will need a refill today.  Will forward to Dr. Mendes for recommendations.  Pt states detailed msg can be left at [phone] with outcome.  Pharmacy is [facility] [address].  "

## 2018-05-17 DIAGNOSIS — F11.20 OPIOID USE DISORDER, SEVERE, DEPENDENCE (H): ICD-10-CM

## 2018-05-17 RX ORDER — BUPRENORPHINE HYDROCHLORIDE AND NALOXONE HYDROCHLORIDE DIHYDRATE 8; 2 MG/1; MG/1
TABLET SUBLINGUAL
Qty: 12 TABLET | Refills: 0 | Status: SHIPPED | OUTPATIENT
Start: 2018-05-17 | End: 2018-05-21

## 2018-05-17 NOTE — TELEPHONE ENCOUNTER
Controlled Substance Refill Request for buprenorphine-naloxone (SUBOXONE) 8-2 MG SUBL sublingual tablet    Last refill: 4/19/2018  #90  For 30 day fill    Last clinic visit: 4/19/2018 with Dr. Fox      Controlled substance agreement on file: No.    Documentation in problem list reviewed:  Yes    Processing:  Patient will  in clinic    RX monitoring program ([place]MP) reviewed:  reviewed- no concerns  [place]MP profile:  [email or web address]    Thanks! Kaya Childers RN

## 2018-05-17 NOTE — TELEPHONE ENCOUNTER
Appears to have appt until 5/21.   Rx completed. Please call to pharmacy of choice and notify patient.

## 2018-05-21 ENCOUNTER — TELEPHONE (OUTPATIENT)
Dept: ADDICTION MEDICINE | Facility: CLINIC | Age: 38
End: 2018-05-21

## 2018-05-21 DIAGNOSIS — F31.89 OTHER BIPOLAR DISORDER (H): ICD-10-CM

## 2018-05-21 DIAGNOSIS — F11.20 OPIOID USE DISORDER, SEVERE, DEPENDENCE (H): ICD-10-CM

## 2018-05-21 RX ORDER — BUPRENORPHINE HYDROCHLORIDE AND NALOXONE HYDROCHLORIDE DIHYDRATE 8; 2 MG/1; MG/1
1 TABLET SUBLINGUAL 3 TIMES DAILY
Qty: 12 TABLET | Refills: 0 | Status: SHIPPED | OUTPATIENT
Start: 2018-05-21 | End: 2018-05-24

## 2018-05-21 RX ORDER — BUPROPION HYDROCHLORIDE 100 MG/1
TABLET, EXTENDED RELEASE ORAL
Qty: 12 TABLET | Refills: 0 | Status: CANCELLED | OUTPATIENT
Start: 2018-05-21

## 2018-05-21 NOTE — TELEPHONE ENCOUNTER
Refill for: Suboxone    Last Appointment: 04.19.2018    Next Appointment: 05.24.2018    No Shows/Cancellations since last appointment:  2 cancellations, 04.23.2018 and 05.21.2018    Last Refill (date and amount/how many days): 05.17.2018 (12 tabs, 4 days)    Most Recent UDS results: Positive BUP, negative all other substances     reviewed and summarized below:    05.17.2018 - Suboxone - 12 tabs/4 days    05.11.2018- Gabapentin - 90 caps/ 30 days    04.19.2018 -Gabapentin - 90 caps/ 30 days    04.19.2018 - Suboxone - 90 tabs/30 days      Will forward to provider.
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
## 2018-05-24 ENCOUNTER — TELEPHONE (OUTPATIENT)
Dept: ADDICTION MEDICINE | Facility: CLINIC | Age: 38
End: 2018-05-24

## 2018-05-24 DIAGNOSIS — F11.20 OPIOID USE DISORDER, SEVERE, DEPENDENCE (H): ICD-10-CM

## 2018-05-24 RX ORDER — BUPRENORPHINE HYDROCHLORIDE AND NALOXONE HYDROCHLORIDE DIHYDRATE 8; 2 MG/1; MG/1
1 TABLET SUBLINGUAL 3 TIMES DAILY
Qty: 18 TABLET | Refills: 0 | Status: SHIPPED | OUTPATIENT
Start: 2018-05-24 | End: 2018-05-31

## 2018-05-24 NOTE — TELEPHONE ENCOUNTER
Reason for Call:  Medication or medication refill:    Do you use a [facility]?  Name of the pharmacy and phone number for the current request:  [facility] [place] tel: [phone]    Name of the medication requested: Suboxone bridge     Other request: pt miss her appt today because she over slept, appt was rescheduled for 5/30. Pt will run out of medication on 5/26.    Can we leave a detailed message on this number? YES    Phone number patient can be reached at: Home number on file [phone] (home)    Best Time: anytime    Call taken on 5/24/2018 at 10:43 AM by Dany Mckeon

## 2018-05-24 NOTE — TELEPHONE ENCOUNTER
Refill for: Suboxone      Last Appointment: 04.19.2018    Next Appointment: 05.30.2018    No Shows/Cancellations since last appointment:  2 cancellations: 04.23.2018, 05.24.2018 (today)    Last Refill in Epic (date and amount/how many days): 05.21.2018 12 films/4 days    Most Recent UDS results: 04.19.2018 - Positive BUP, negative all other substances      reviewed and summarized below:    05.21.208.18 -Subpxpme - 12 tabs/4 days     05.17.2018 - Suboxone - 12 tabs/4 days     05.11.2018- Gabapentin - 90 caps/ 30 days     04.19.2018 -Gabapentin - 90 caps/ 30 days     04.19.2018 - Suboxone - 90 tabs/30 days     Will route to provider.

## 2018-05-31 ENCOUNTER — TELEPHONE (OUTPATIENT)
Dept: ADDICTION MEDICINE | Facility: CLINIC | Age: 38
End: 2018-05-31

## 2018-05-31 DIAGNOSIS — F11.20 OPIOID USE DISORDER, SEVERE, DEPENDENCE (H): ICD-10-CM

## 2018-05-31 RX ORDER — BUPRENORPHINE HYDROCHLORIDE AND NALOXONE HYDROCHLORIDE DIHYDRATE 8; 2 MG/1; MG/1
1 TABLET SUBLINGUAL 3 TIMES DAILY
Qty: 24 TABLET | Refills: 0 | Status: SHIPPED | OUTPATIENT
Start: 2018-05-31 | End: 2018-06-07

## 2018-05-31 NOTE — TELEPHONE ENCOUNTER
Reason for Call:  Medication or medication refill:    Do you use a [facility]?  Name of the pharmacy and phone number for the current request:  [facility] [place] tel: [phone]    Name of the medication requested: Suboxone bridge     Other request: pt no show her last appt 5/30. Pt will need a bridge until her next appt on 6/7. Pt will run out of medication on 6/3.    Can we leave a detailed message on this number? YES    Phone number patient can be reached at: Home number on file [phone] (home)    Best Time: anytime     Call taken on 5/31/2018 at 9:00 AM by Dany Mckeon

## 2018-05-31 NOTE — TELEPHONE ENCOUNTER
Refill for: Suboxone    Last Appointment: 04.19.2018    Next Appointment: 06.07.2018    No Shows/Cancellations since last appointment:  No-show 2 (05.30.2018, 05.24.2018),  Cancellation 2 - (04.23.2018, 05.21.2018    Last Refill in Epic (date and amount/how many days): 05.24.2018 - 18 tabs/6 days    Most Recent UDS results: 04.19.2018 - Positive BUP, negative for all other substances.     reviewed and summarized below:      05.24.2018 - Suboxone 18 tabs/6 days    05.19.2018 - Suboxone 18 tabs/6 days (written on 05.03.2018)    05.17.2018 Suboxone 12 tabs/4 days    05.11.2018 Gabapentin 90 caps/30 days (written on 05.03.2018)    Will forward to provider.

## 2018-05-31 NOTE — TELEPHONE ENCOUNTER
Rx completed. Please call to pharmacy of choice and notify patient.     Please notify her she will need to be seen for further rx after several bridge.

## 2018-05-31 NOTE — TELEPHONE ENCOUNTER
Called into pharmacy.  Left VM advising patient regarding additional refills needing an appointment and provided date and time of appointment.

## 2018-06-05 DIAGNOSIS — F19.20 CHEMICAL DEPENDENCY (H): ICD-10-CM

## 2018-06-05 DIAGNOSIS — F31.89 OTHER BIPOLAR DISORDER (H): ICD-10-CM

## 2018-06-07 ENCOUNTER — OFFICE VISIT (OUTPATIENT)
Dept: PSYCHIATRY | Facility: CLINIC | Age: 38
End: 2018-06-07
Attending: PSYCHIATRY & NEUROLOGY
Payer: COMMERCIAL

## 2018-06-07 ENCOUNTER — OFFICE VISIT (OUTPATIENT)
Dept: ADDICTION MEDICINE | Facility: CLINIC | Age: 38
End: 2018-06-07
Payer: COMMERCIAL

## 2018-06-07 VITALS
HEART RATE: 51 BPM | WEIGHT: 166.2 LBS | SYSTOLIC BLOOD PRESSURE: 109 MMHG | DIASTOLIC BLOOD PRESSURE: 73 MMHG | BODY MASS INDEX: 26.83 KG/M2

## 2018-06-07 VITALS
HEIGHT: 66 IN | SYSTOLIC BLOOD PRESSURE: 120 MMHG | HEART RATE: 60 BPM | WEIGHT: 167 LBS | BODY MASS INDEX: 26.84 KG/M2 | RESPIRATION RATE: 16 BRPM | OXYGEN SATURATION: 99 % | TEMPERATURE: 98.5 F | DIASTOLIC BLOOD PRESSURE: 80 MMHG

## 2018-06-07 DIAGNOSIS — F43.10 PTSD (POST-TRAUMATIC STRESS DISORDER): ICD-10-CM

## 2018-06-07 DIAGNOSIS — F11.20 OPIOID USE DISORDER, SEVERE, DEPENDENCE (H): Primary | ICD-10-CM

## 2018-06-07 DIAGNOSIS — F60.3 BORDERLINE PERSONALITY DISORDER (H): ICD-10-CM

## 2018-06-07 DIAGNOSIS — F19.20 CHEMICAL DEPENDENCY (H): ICD-10-CM

## 2018-06-07 DIAGNOSIS — F31.89 OTHER BIPOLAR DISORDER (H): ICD-10-CM

## 2018-06-07 DIAGNOSIS — F43.10 PTSD (POST-TRAUMATIC STRESS DISORDER): Primary | ICD-10-CM

## 2018-06-07 DIAGNOSIS — N30.10 INTERSTITIAL CYSTITIS: ICD-10-CM

## 2018-06-07 PROCEDURE — 99215 OFFICE O/P EST HI 40 MIN: CPT | Performed by: PEDIATRICS

## 2018-06-07 PROCEDURE — G0463 HOSPITAL OUTPT CLINIC VISIT: HCPCS | Mod: ZF

## 2018-06-07 PROCEDURE — 80306 DRUG TEST PRSMV INSTRMNT: CPT | Performed by: PEDIATRICS

## 2018-06-07 RX ORDER — LAMOTRIGINE 150 MG/1
150 TABLET ORAL 2 TIMES DAILY
Qty: 60 TABLET | Refills: 3 | Status: SHIPPED | OUTPATIENT
Start: 2018-06-07 | End: 2018-11-19

## 2018-06-07 RX ORDER — BUPROPION HYDROCHLORIDE 100 MG/1
TABLET, EXTENDED RELEASE ORAL
Qty: 60 TABLET | Refills: 0 | Status: SHIPPED | OUTPATIENT
Start: 2018-06-07 | End: 2018-07-02

## 2018-06-07 RX ORDER — BUPRENORPHINE HYDROCHLORIDE AND NALOXONE HYDROCHLORIDE DIHYDRATE 8; 2 MG/1; MG/1
1 TABLET SUBLINGUAL 3 TIMES DAILY
Qty: 90 TABLET | Refills: 0 | Status: SHIPPED | OUTPATIENT
Start: 2018-06-07 | End: 2018-07-05

## 2018-06-07 ASSESSMENT — PAIN SCALES - GENERAL: PAINLEVEL: NO PAIN (0)
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
## 2018-06-07 NOTE — PATIENT INSTRUCTIONS
Call if you want to try increasing the bupropion before next appointment or if things get worse    Cut Lamictal to 1250 mg twice daily

## 2018-06-07 NOTE — MR AVS SNAPSHOT
After Visit Summary   6/7/2018    Yara Edouard    MRN: [id number]           Patient Information     Date Of Birth          1980        Visit Information        Provider Department      6/7/2018 2:45 PM Satya Mendes MD Psychiatry Clinic        Today's Diagnoses     PTSD (post-traumatic stress disorder)    -  1    Other bipolar disorder (H)        Chemical dependency (H)          Care Instructions    Call if you want to try increasing the bupropion before next appointment or if things get worse    Cut Lamictal to 1250 mg twice daily          Follow-ups after your visit        Follow-up notes from your care team     Return in about 6 weeks (around 7/19/2018).      Your next 10 appointments already scheduled     Jul 11, 2018  8:20 AM CDT   Return Visit with Maine Fox MD   [facility]    [address] [place] [address]  [address] [place] [address] [phone]            Jul 19, 2018  2:45 PM CDT   Schizophrenia Follow Up with Satya Mendes MD   Psychiatry Clinic ([facility])    [place] [id number]  [address] [address] [phone]              Who to contact     Please call your clinic at [phone] to:    Ask questions about your health    Make or cancel appointments    Discuss your medicines    Learn about your test results    Speak to your doctor            Additional Information About Your Visit        MyChart Information     [employer] gives you secure access to your electronic health record. If you see a primary care provider, you can also send messages to your care team and make appointments. If you have questions, please call your primary care clinic.  If you do not have a primary care provider, please call [phone] and they will assist you.      [address] is an electronic gateway that provides easy, online access to your medical records. With  [facility], you can request a clinic appointment, read your test results, renew a prescription or communicate with your care team.     To access your existing account, please contact your [facility] or call [phone] for assistance.        Care EveryWhere ID     This is your Care EveryWhere ID. This could be used by other organizations to access your [place] medical records  [id number]        Your Vitals Were     Pulse BMI (Body Mass Index)                51 26.83 kg/m2           Blood Pressure from Last 3 Encounters:   06/07/18 109/73   06/07/18 120/80   05/03/18 (!) 148/97    Weight from Last 3 Encounters:   06/07/18 75.4 kg (166 lb 3.2 oz)   06/07/18 75.8 kg (167 lb)   05/03/18 79.1 kg (174 lb 6.4 oz)              Today, you had the following     No orders found for display         Today's Medication Changes          These changes are accurate as of 6/7/18 11:59 PM.  If you have any questions, ask your nurse or doctor.               These medicines have changed or have updated prescriptions.        Dose/Directions    lamoTRIgine 150 MG tablet   Commonly known as:  LaMICtal   This may have changed:    - when to take this  - additional instructions  - Another medication with the same name was removed. Continue taking this medication, and follow the directions you see here.   Used for:  Other bipolar disorder (H)   Changed by:  Satya Mendes MD        Dose:  150 mg   Take 1 tablet (150 mg) by mouth 2 times daily   Quantity:  60 tablet   Refills:  3            Where to get your medicines      These medications were sent to [facility] [address] [place] [address] & [address] [address]     Phone:  [phone]     buPROPion 100 MG 12 hr tablet    lamoTRIgine 150 MG tablet         Some of these will need a paper prescription and others can be bought over the counter.  Ask your nurse if you have questions.      Bring a paper prescription for each of these medications     buprenorphine-naloxone 8-2 MG Subl sublingual tablet                Primary Care Provider Office Phone # Fax #    Neyda Ori Meza [phone]       [facility] [address]  [facility] [address]        Equal Access to Services     TRISTAN GRULLON : Hadii aad ku hadasho Soomaali, waaxda luqadaha, qaybta kaalmada adeegyada, desiree gordilloin haypatrian adeayesha lavonnetessie hurd . So [facility] [phone].    ATENCIÓN: Si habla español, tiene a galeana disposición servicios gratuitos de asistencia lingüística. Jese al [phone].    We comply with applicable federal civil rights laws and [place] laws. We do not discriminate on the basis of race, color, national origin, age, disability, sex, sexual orientation, or gender identity.            Thank you!     Thank you for choosing PSYCHIATRY CLINIC  for your care. Our goal is always to provide you with excellent care. Hearing back from our patients is one way we can continue to improve our services. Please take a few minutes to complete the written survey that you may receive in the mail after your visit with us. Thank you!             Your Updated Medication List - Protect others around you: Learn how to safely use, store and throw away your medicines at www.disposemymeds.org.          This list is accurate as of 6/7/18 11:59 PM.  Always use your most recent med list.                   Brand Name Dispense Instructions for use Diagnosis    7-KETO LEAN PO      Take 1 tablet by mouth daily        albuterol 108 (90 Base) MCG/ACT Inhaler    PROAIR HFA/PROVENTIL HFA/VENTOLIN HFA     Inhale 2 puffs into the lungs every 6 hours        ascorbic acid 1000 MG Tabs    vitamin C     Take 1,000 mg by mouth daily        buprenorphine-naloxone 8-2 MG Subl sublingual tablet    SUBOXONE    90 tablet    Place 1 tablet under the tongue 3 times daily    Opioid use disorder, severe, dependence (H)        buPROPion 100 MG 12 hr tablet    WELLBUTRIN SR    60 tablet    Take 1 tablet BID (take 6 hours apart and second dose should not be taken after 4:00 pm)    Other bipolar disorder (H)       Calcium-Magnesium-Zinc 167-83-8 MG Tabs      Take 1 tablet by mouth every morning        cholecalciferol 24848 units capsule    vitamin D3     Take 10,000 Units by mouth daily        chromium 200 MCG Caps capsule      Take 200 mcg by mouth daily        DEPLIN 7.5 MG Tabs     30 tablet    Take 7.5 mg by mouth daily        DULCOLAX 5 MG EC tablet   Generic drug:  bisacodyl      Take 10 mg by mouth At Bedtime        fish oil-omega-3 fatty acids 1000 MG capsule      Take 1,000 mg by mouth 4 times daily        ibuprofen 200 MG tablet    ADVIL/MOTRIN     Take 800 mg by mouth every 6 hours as needed        L-Theanine 100 MG Caps      Take 1 capsule by mouth 2 times daily as needed        lamoTRIgine 150 MG tablet    LaMICtal    60 tablet    Take 1 tablet (150 mg) by mouth 2 times daily    Other bipolar disorder (H)       Melatonin 10 MG Tabs tablet      Take 10 mg by mouth At Bedtime        omeprazole 20 MG CR capsule    priLOSEC    90 capsule    Take 1 capsule (20 mg) by mouth every morning    Chemical dependency (H)       propranolol 40 MG tablet    INDERAL    100 tablet    Take 1 tablet (40 mg) by mouth 4 times daily as needed for tremor    PTSD (post-traumatic stress disorder)       QUEtiapine 100 MG tablet    SEROquel    60 tablet    Take 1-2 tablets (100-200 mg) by mouth nightly as needed    PTSD (post-traumatic stress disorder)       sucralfate 1 GM tablet    CARAFATE     Take 1 g by mouth 2 times daily
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
## 2018-06-07 NOTE — NURSING NOTE
Chief Complaint   Patient presents with     Recheck Medication     PTSD (post-traumatic stress disorder)

## 2018-06-07 NOTE — MR AVS SNAPSHOT
After Visit Summary   6/7/2018    Yara Edouard    MRN: [id number]           Patient Information     Date Of Birth          1980        Visit Information        Provider Department      6/7/2018 1:20 PM Maine Fox MD [facility]        Today's Diagnoses     Opioid use disorder, severe, dependence (H)    -  1    Other bipolar disorder (H)        PTSD (post-traumatic stress disorder)        Borderline personality disorder        Interstitial cystitis           Follow-ups after your visit        Your next 10 appointments already scheduled     Jul 05, 2018 11:40 AM CDT   Return Visit with Maine Fox MD   [facility] ([facility]    [address]  [address]  [facility] [address] [phone]              Who to contact     If you have questions or need follow up information about today's clinic visit or your schedule please contact [facility] directly at [phone].  Normal or non-critical lab and imaging results will be communicated to you by [employer], letter or phone within 4 business days after the clinic has received the results. If you do not hear from us within 7 days, please contact the clinic through [employer] or phone. If you have a critical or abnormal lab result, we will notify you by phone as soon as possible.  Submit refill requests through [facility] or call your pharmacy and they will forward the refill request to us. Please allow 3 business days for your refill to be completed.          Additional Information About Your Visit        MyChart Information     [facility] gives you secure access to your electronic health record. If you see a primary care provider, you can also send messages to your care team and make appointments. If you have questions, please call your primary care clinic.  If you do not have a primary care provider, please call  "[phone] and they will assist you.        Care EveryWhere ID     This is your Care EveryWhere ID. This could be used by other organizations to access your [facility] medical records  [id number]        Your Vitals Were     Pulse Temperature Respirations Height Pulse Oximetry BMI (Body Mass Index)    60 98.5  F (36.9  C) (Oral) 16 5' 6\" (1.676 m) 99% 26.95 kg/m2       Blood Pressure from Last 3 Encounters:   06/07/18 109/73   06/07/18 120/80   05/03/18 (!) 148/97    Weight from Last 3 Encounters:   06/07/18 166 lb 3.2 oz (75.4 kg)   06/07/18 167 lb (75.8 kg)   05/03/18 174 lb 6.4 oz (79.1 kg)              We Performed the Following     Urine Drugs of Abuse Screen Panel 13          Where to get your medicines      Some of these will need a paper prescription and others can be bought over the counter.  Ask your nurse if you have questions.     Bring a paper prescription for each of these medications     buprenorphine-naloxone 8-2 MG Subl sublingual tablet          Primary Care Provider Office Phone # Fax #    Barrycel Ori Meza [phone]       [place] [address]  [facility] [address]        Equal Access to Services     TRISTAN GRULLON AH: Hadii bibi ku hadasho Soomaali, waaxda luqadaha, qaybta kaalmada adeegyada, desiree pollard haycynthia hurd . So [facility] [phone].    ATENCIÓN: Si habla español, tiene a galeana disposición servicios gratuitos de asistencia lingüística. Horacioame al [phone].    We comply with applicable federal civil rights laws and [place] laws. We do not discriminate on the basis of race, color, national origin, age, disability, sex, sexual orientation, or gender identity.            Thank you!     Thank you for choosing [facility]  for your care. Our goal is always to provide you with excellent care. Hearing back from our patients is one way we can continue to improve our services. Please take a few minutes to " complete the written survey that you may receive in the mail after your visit with us. Thank you!             Your Updated Medication List - Protect others around you: Learn how to safely use, store and throw away your medicines at www.disposemymeds.org.          This list is accurate as of 6/7/18  2:59 PM.  Always use your most recent med list.                   Brand Name Dispense Instructions for use Diagnosis    7-KETO LEAN PO      Take 1 tablet by mouth daily        albuterol 108 (90 Base) MCG/ACT Inhaler    PROAIR HFA/PROVENTIL HFA/VENTOLIN HFA     Inhale 2 puffs into the lungs every 6 hours        ascorbic acid 1000 MG Tabs    vitamin C     Take 1,000 mg by mouth daily        buprenorphine-naloxone 8-2 MG Subl sublingual tablet    SUBOXONE    90 tablet    Place 1 tablet under the tongue 3 times daily    Opioid use disorder, severe, dependence (H)       buPROPion 100 MG 12 hr tablet    WELLBUTRIN SR    60 tablet    Take 1 tablet BID (take 6 hours apart and second dose should not be taken after 4:00 pm)    Other bipolar disorder (H)       Calcium-Magnesium-Zinc 167-83-8 MG Tabs      Take 1 tablet by mouth every morning        cholecalciferol 29982 units capsule    vitamin D3     Take 10,000 Units by mouth daily        chromium 200 MCG Caps capsule      Take 200 mcg by mouth daily        cloNIDine 0.1 MG tablet    CATAPRES    180 tablet    Take 1-2 tablets (0.1-0.2 mg) by mouth 3 times daily as needed (anxiety)    Chemical dependency (H)       DEPLIN 7.5 MG Tabs     30 tablet    Take 7.5 mg by mouth daily        DULCOLAX 5 MG EC tablet   Generic drug:  bisacodyl      Take 10 mg by mouth At Bedtime        fish oil-omega-3 fatty acids 1000 MG capsule      Take 1,000 mg by mouth 4 times daily        FLUoxetine 40 MG capsule    PROzac    30 capsule    Take 1 capsule (40 mg) by mouth daily    Recurrent major depressive disorder, in partial remission (H)       gabapentin 300 MG capsule    NEURONTIN    90 capsule     Take 1 capsule (300 mg) by mouth 3 times daily    Opioid use disorder, severe, dependence (H), Other bipolar disorder (H), PTSD (post-traumatic stress disorder)       ibuprofen 200 MG tablet    ADVIL/MOTRIN     Take 800 mg by mouth every 6 hours as needed        L-Theanine 100 MG Caps      Take 1 capsule by mouth 2 times daily as needed        * lamoTRIgine 150 MG tablet    LaMICtal    30 tablet    Take 1 tablet (150 mg) by mouth daily along with 200 mg tab at night    Other bipolar disorder (H)       * lamoTRIgine 200 MG tablet    LaMICtal    30 tablet    Take 1 tablet (200 mg) by mouth At Bedtime with 150 mg every morning    PTSD (post-traumatic stress disorder)       Melatonin 10 MG Tabs tablet      Take 10 mg by mouth At Bedtime        omeprazole 20 MG CR capsule    priLOSEC    90 capsule    Take 1 capsule (20 mg) by mouth every morning    Chemical dependency (H)       propranolol 40 MG tablet    INDERAL    100 tablet    Take 1 tablet (40 mg) by mouth 4 times daily as needed for tremor    PTSD (post-traumatic stress disorder)       QUEtiapine 100 MG tablet    SEROquel    60 tablet    Take 1-2 tablets (100-200 mg) by mouth nightly as needed    PTSD (post-traumatic stress disorder)       sucralfate 1 GM tablet    CARAFATE     Take 1 g by mouth 2 times daily        * Notice:  This list has 2 medication(s) that are the same as other medications prescribed for you. Read the directions carefully, and ask your doctor or other care provider to review them with you.

## 2018-06-08 RX ORDER — BUPROPION HYDROCHLORIDE 100 MG/1
TABLET, EXTENDED RELEASE ORAL
Qty: 60 TABLET | Refills: 0 | OUTPATIENT
Start: 2018-06-08

## 2018-06-08 RX ORDER — CLONIDINE HYDROCHLORIDE 0.1 MG/1
.1-.2 TABLET ORAL 3 TIMES DAILY PRN
Qty: 180 TABLET | Refills: 0 | Status: SHIPPED | OUTPATIENT
Start: 2018-06-08 | End: 2018-07-02

## 2018-06-08 NOTE — TELEPHONE ENCOUNTER
Medication requested:  cloNIDine (CATAPRES) 0.1 MG  Last refilled: 5/4/18  Qty: 180    Last seen: 6/7/18  RTC: 6 WEEKS  Cancel: 0  No-show: 0  Next appt: NONE    Refill decision: 30 day odilia refill sent to the pharmacy - including instructions for patient to call the clinic and schedule an appointment.  Scheduling has been notified to contact the pt for appointment.

## 2018-07-02 DIAGNOSIS — F43.10 PTSD (POST-TRAUMATIC STRESS DISORDER): ICD-10-CM

## 2018-07-02 DIAGNOSIS — F31.89 OTHER BIPOLAR DISORDER (H): ICD-10-CM

## 2018-07-02 DIAGNOSIS — F33.41 RECURRENT MAJOR DEPRESSIVE DISORDER, IN PARTIAL REMISSION (H): ICD-10-CM

## 2018-07-02 DIAGNOSIS — F19.20 CHEMICAL DEPENDENCY (H): ICD-10-CM

## 2018-07-02 DIAGNOSIS — F11.20 OPIOID USE DISORDER, SEVERE, DEPENDENCE (H): ICD-10-CM

## 2018-07-02 RX ORDER — GABAPENTIN 300 MG/1
300 CAPSULE ORAL 3 TIMES DAILY
Qty: 90 CAPSULE | Refills: 0 | Status: SHIPPED | OUTPATIENT
Start: 2018-07-02 | End: 2018-08-02

## 2018-07-02 NOTE — TELEPHONE ENCOUNTER
Last Written Prescription Date:  5/3/18  Last Fill Quantity: 90,  # refills: 0   Last office visit: 6/7/2018 with prescribing provider:     Future Office Visit:   Next 5 appointments (look out 90 days)     Jul 05, 2018 11:40 AM CDT   Return Visit with Maine Fox MD   [facility] ([facility])    [address]  [address]  [facility] [id number] [phone]                 Requested Prescriptions   Pending Prescriptions Disp Refills     gabapentin (NEURONTIN) 300 MG capsule 90 capsule 0     Sig: Take 1 capsule (300 mg) by mouth 3 times daily    There is no refill protocol information for this order

## 2018-07-02 NOTE — TELEPHONE ENCOUNTER
Refill for: Gabapentin 300 mg QTY 90    Last Appointment: 6/7/2018    Next Appointment: 7/5/18    No Shows/Cancellations since last appointment:  none    Last Refill in Epic (date and amount/how many days): from Dr. Floyd  gabapentin (NEURONTIN) 300 MG capsule 90 capsule 0 5/3/2018  No   Sig - Route: Take 1 capsule (300 mg) by mouth 3 times daily - Oral   Class: E-Prescribe   Order: [id number]   E-Prescribing Status: Receipt confirmed by pharmacy (5/3/2018  4:52 PM CDT)       Most Recent UDS results: 6/7/18 : POS only for Buprenorphine     reviewed and summarized below:  6/7/2018 Gabapentin 300 mg caps QTY 90  6/7/2018 Buprenorphine-Naloxone 8-2 mg subl QTY 90

## 2018-07-02 NOTE — TELEPHONE ENCOUNTER
Medication requested: buPROPion (WELLBUTRIN SR) 100 MG    Last refilled: 6/7/18    Qty:60    Medication requested:  cloNIDine (CATAPRES) 0.1 MG  Last refilled: 6/8/18  Qty: 180    Medication requested:  FLUoxetine (PROZAC) 40 MG   Last refilled: 6/4/18  Qty: 30    Last seen: 6/7/18  RTC: 6 WEEKS   Cancel: 0  No-show: 0  Next appt: 7/19/18     Refill decision:   30 DAY RF PENDING  /  UNSIGNED NOTE

## 2018-07-03 RX ORDER — FLUOXETINE 40 MG/1
40 CAPSULE ORAL DAILY
Qty: 30 CAPSULE | Refills: 0 | Status: SHIPPED | OUTPATIENT
Start: 2018-07-03 | End: 2018-08-02

## 2018-07-03 RX ORDER — CLONIDINE HYDROCHLORIDE 0.1 MG/1
.1-.2 TABLET ORAL 3 TIMES DAILY PRN
Qty: 180 TABLET | Refills: 0 | Status: SHIPPED | OUTPATIENT
Start: 2018-07-03 | End: 2018-08-02

## 2018-07-03 RX ORDER — BUPROPION HYDROCHLORIDE 100 MG/1
TABLET, EXTENDED RELEASE ORAL
Qty: 60 TABLET | Refills: 0 | Status: SHIPPED | OUTPATIENT
Start: 2018-07-03 | End: 2018-08-02

## 2018-07-05 ENCOUNTER — TELEPHONE (OUTPATIENT)
Dept: ADDICTION MEDICINE | Facility: CLINIC | Age: 38
End: 2018-07-05

## 2018-07-05 DIAGNOSIS — F11.20 OPIOID USE DISORDER, SEVERE, DEPENDENCE (H): ICD-10-CM

## 2018-07-05 RX ORDER — BUPRENORPHINE HYDROCHLORIDE AND NALOXONE HYDROCHLORIDE DIHYDRATE 8; 2 MG/1; MG/1
1 TABLET SUBLINGUAL 3 TIMES DAILY
Qty: 15 TABLET | Refills: 0 | Status: SHIPPED | OUTPATIENT
Start: 2018-07-05 | End: 2018-07-12

## 2018-07-05 NOTE — TELEPHONE ENCOUNTER
Refill for: Suboxone    Last Appointment: 06.07.2018    Next Appointment: 07.11.2018    No Shows/Cancellations since last appointment:  Late cancelled appointment for today (07.05.2018)    Last Refill in Epic (date and amount/how many days): 06.07.2018, 90 tabs/30 days    Most Recent UDS results: 06.07.2018, Positive BUP, negative all other substances     reviewed and summarized below:    06.07.2018 Gabapentin 90 caps/30 days    06.07.2018 Suboxone 90 tabs/30 days    05.31.2018  Suboxone 24 tabs/8 days    Will forward to provider.

## 2018-07-05 NOTE — TELEPHONE ENCOUNTER
Pt called to reschedule appt with Ruddy. Pt is scheduled for 7/11/18. Pt stated that she needs a bridge to get her from 7/7/18 -7/11/18.     Preferred pharmacy: [facility] [phone]    Pt #: [phone] (okay to leave detailed message)     Danielle Valdez

## 2018-07-12 ENCOUNTER — OFFICE VISIT (OUTPATIENT)
Dept: ADDICTION MEDICINE | Facility: CLINIC | Age: 38
End: 2018-07-12
Payer: COMMERCIAL

## 2018-07-12 VITALS
RESPIRATION RATE: 14 BRPM | TEMPERATURE: 98.3 F | DIASTOLIC BLOOD PRESSURE: 64 MMHG | WEIGHT: 171 LBS | SYSTOLIC BLOOD PRESSURE: 98 MMHG | OXYGEN SATURATION: 97 % | HEART RATE: 51 BPM | BODY MASS INDEX: 27.6 KG/M2

## 2018-07-12 DIAGNOSIS — F60.3 BORDERLINE PERSONALITY DISORDER (H): ICD-10-CM

## 2018-07-12 DIAGNOSIS — F31.89 OTHER BIPOLAR DISORDER (H): ICD-10-CM

## 2018-07-12 DIAGNOSIS — N30.10 INTERSTITIAL CYSTITIS: ICD-10-CM

## 2018-07-12 DIAGNOSIS — F43.10 PTSD (POST-TRAUMATIC STRESS DISORDER): ICD-10-CM

## 2018-07-12 DIAGNOSIS — F11.20 OPIOID USE DISORDER, SEVERE, DEPENDENCE (H): Primary | ICD-10-CM

## 2018-07-12 PROCEDURE — 80306 DRUG TEST PRSMV INSTRMNT: CPT | Performed by: PEDIATRICS

## 2018-07-12 PROCEDURE — 99214 OFFICE O/P EST MOD 30 MIN: CPT | Performed by: PEDIATRICS

## 2018-07-12 RX ORDER — BUPRENORPHINE HYDROCHLORIDE AND NALOXONE HYDROCHLORIDE DIHYDRATE 8; 2 MG/1; MG/1
1 TABLET SUBLINGUAL 3 TIMES DAILY
Qty: 90 TABLET | Refills: 0 | Status: SHIPPED | OUTPATIENT
Start: 2018-07-12 | End: 2018-08-09

## 2018-07-12 NOTE — MR AVS SNAPSHOT
After Visit Summary   7/12/2018    Yara Edouard    MRN: [id number]           Patient Information     Date Of Birth          1980        Visit Information        Provider Department      7/12/2018 8:40 AM Maine Fox MD [facility]        Today's Diagnoses     Opioid use disorder, severe, dependence (H)    -  1    Other bipolar disorder (H)        PTSD (post-traumatic stress disorder)        Borderline personality disorder        Interstitial cystitis           Follow-ups after your visit        Your next 10 appointments already scheduled     Jul 19, 2018  2:45 PM CDT   Schizophrenia Follow Up with Satya Mendes MD   Psychiatry Clinic ([facility])    [address]  [address] [id number] [phone]            Aug 09, 2018 11:20 AM CDT   Return Visit with Maine Fox MD   [facility] ([facility])    [address] [place] [address]  [address]  [facility] [address] [phone]              Who to contact     If you have questions or need follow up information about today's clinic visit or your schedule please contact [facility] directly at [phone].  Normal or non-critical lab and imaging results will be communicated to you by MyChart, letter or phone within 4 business days after the clinic has received the results. If you do not hear from us within 7 days, please contact the clinic through CellPhirehart or phone. If you have a critical or abnormal lab result, we will notify you by phone as soon as possible.  Submit refill requests through iconDial or call your pharmacy and they will forward the refill request to us. Please allow 3 business days for your refill to be completed.          Additional Information About Your Visit        CellPhireharXtraice Information     iconDial gives you secure access to your electronic  health record. If you see a primary care provider, you can also send messages to your care team and make appointments. If you have questions, please call your primary care clinic.  If you do not have a primary care provider, please call [phone] and they will assist you.        Care EveryWhere ID     This is your Care EveryWhere ID. This could be used by other organizations to access your [place] medical records  [id number]        Your Vitals Were     Pulse Temperature Respirations Pulse Oximetry BMI (Body Mass Index)       51 98.3  F (36.8  C) (Oral) 14 97% 27.6 kg/m2        Blood Pressure from Last 3 Encounters:   07/12/18 98/64   06/07/18 109/73   06/07/18 120/80    Weight from Last 3 Encounters:   07/12/18 171 lb (77.6 kg)   06/07/18 166 lb 3.2 oz (75.4 kg)   06/07/18 167 lb (75.8 kg)              We Performed the Following     Urine Drugs of Abuse Screen Panel 13          Where to get your medicines      Some of these will need a paper prescription and others can be bought over the counter.  Ask your nurse if you have questions.     Bring a paper prescription for each of these medications     buprenorphine-naloxone 8-2 MG Subl sublingual tablet          Primary Care Provider Office Phone # Fax #    Enncel Ori Meza [phone]       [place] [address]  [facility] [address]        Equal Access to Services     MARK GRULLON AH: Hadii bibi ku hadasho Soomaali, waaxda luqadaha, qaybta kaalmada adeegyada, desiree acuna. So [facility] [phone].    ATENCIÓN: Si habla español, tiene a galeana disposición servicios gratuitos de asistencia lingüística. Llame al [phone].    We comply with applicable federal civil rights laws and [place] laws. We do not discriminate on the basis of race, color, national origin, age, disability, sex, sexual orientation, or gender identity.            Thank you!     Thank you for choosing [facility] ([facility]  [facility]  for your care. Our goal is always to provide you with excellent care. Hearing back from our patients is one way we can continue to improve our services. Please take a few minutes to complete the written survey that you may receive in the mail after your visit with us. Thank you!             Your Updated Medication List - Protect others around you: Learn how to safely use, store and throw away your medicines at www.disposemymeds.org.          This list is accurate as of 7/12/18  9:08 AM.  Always use your most recent med list.                   Brand Name Dispense Instructions for use Diagnosis    7-KETO LEAN PO      Take 1 tablet by mouth daily        albuterol 108 (90 Base) MCG/ACT Inhaler    PROAIR HFA/PROVENTIL HFA/VENTOLIN HFA     Inhale 2 puffs into the lungs every 6 hours        ascorbic acid 1000 MG Tabs    vitamin C     Take 1,000 mg by mouth daily        buprenorphine-naloxone 8-2 MG Subl sublingual tablet    SUBOXONE    90 tablet    Place 1 tablet under the tongue 3 times daily    Opioid use disorder, severe, dependence (H)       buPROPion 100 MG 12 hr tablet    WELLBUTRIN SR    60 tablet    Take 1 tablet BID (take 6 hours apart and second dose should not be taken after 4:00 pm)    Other bipolar disorder (H)       Calcium-Magnesium-Zinc 167-83-8 MG Tabs      Take 1 tablet by mouth every morning        cholecalciferol 25024 units capsule    vitamin D3     Take 10,000 Units by mouth daily        chromium 200 MCG Caps capsule      Take 200 mcg by mouth daily        cloNIDine 0.1 MG tablet    CATAPRES    180 tablet    Take 1-2 tablets (0.1-0.2 mg) by mouth 3 times daily as needed (anxiety) Call clinic to schedule MD APPT    Chemical dependency (H)       DEPLIN 7.5 MG Tabs     30 tablet    Take 7.5 mg by mouth daily        DULCOLAX 5 MG EC tablet   Generic drug:  bisacodyl      Take 10 mg by mouth At Bedtime        fish oil-omega-3 fatty acids 1000 MG capsule      Take 1,000 mg by mouth  4 times daily        FLUoxetine 40 MG capsule    PROzac    30 capsule    Take 1 capsule (40 mg) by mouth daily    Recurrent major depressive disorder, in partial remission (H)       gabapentin 300 MG capsule    NEURONTIN    90 capsule    Take 1 capsule (300 mg) by mouth 3 times daily    Opioid use disorder, severe, dependence (H), Other bipolar disorder (H), PTSD (post-traumatic stress disorder)       ibuprofen 200 MG tablet    ADVIL/MOTRIN     Take 800 mg by mouth every 6 hours as needed        L-Theanine 100 MG Caps      Take 1 capsule by mouth 2 times daily as needed        lamoTRIgine 150 MG tablet    LaMICtal    60 tablet    Take 1 tablet (150 mg) by mouth 2 times daily    Other bipolar disorder (H)       Melatonin 10 MG Tabs tablet      Take 10 mg by mouth At Bedtime        omeprazole 20 MG CR capsule    priLOSEC    90 capsule    Take 1 capsule (20 mg) by mouth every morning    Chemical dependency (H)       propranolol 40 MG tablet    INDERAL    100 tablet    Take 1 tablet (40 mg) by mouth 4 times daily as needed for tremor    PTSD (post-traumatic stress disorder)       QUEtiapine 100 MG tablet    SEROquel    60 tablet    Take 1-2 tablets (100-200 mg) by mouth nightly as needed    PTSD (post-traumatic stress disorder)       sucralfate 1 GM tablet    CARAFATE     Take 1 g by mouth 2 times daily
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
## 2018-08-02 DIAGNOSIS — F43.10 PTSD (POST-TRAUMATIC STRESS DISORDER): ICD-10-CM

## 2018-08-02 DIAGNOSIS — F33.41 RECURRENT MAJOR DEPRESSIVE DISORDER, IN PARTIAL REMISSION (H): ICD-10-CM

## 2018-08-02 DIAGNOSIS — F11.20 OPIOID USE DISORDER, SEVERE, DEPENDENCE (H): ICD-10-CM

## 2018-08-02 DIAGNOSIS — F19.20 CHEMICAL DEPENDENCY (H): ICD-10-CM

## 2018-08-02 DIAGNOSIS — F31.89 OTHER BIPOLAR DISORDER (H): ICD-10-CM

## 2018-08-02 RX ORDER — BUPROPION HYDROCHLORIDE 100 MG/1
TABLET, EXTENDED RELEASE ORAL
Qty: 60 TABLET | Refills: 0 | Status: SHIPPED | OUTPATIENT
Start: 2018-08-02 | End: 2018-08-23

## 2018-08-02 RX ORDER — GABAPENTIN 300 MG/1
300 CAPSULE ORAL 3 TIMES DAILY
Qty: 90 CAPSULE | Refills: 0 | Status: SHIPPED | OUTPATIENT
Start: 2018-08-02 | End: 2018-09-04

## 2018-08-02 RX ORDER — CLONIDINE HYDROCHLORIDE 0.1 MG/1
.1-.2 TABLET ORAL 3 TIMES DAILY PRN
Qty: 180 TABLET | Refills: 0 | Status: SHIPPED | OUTPATIENT
Start: 2018-08-02 | End: 2018-08-17

## 2018-08-02 RX ORDER — FLUOXETINE 40 MG/1
40 CAPSULE ORAL DAILY
Qty: 30 CAPSULE | Refills: 0 | Status: SHIPPED | OUTPATIENT
Start: 2018-08-02 | End: 2018-08-17

## 2018-08-02 NOTE — TELEPHONE ENCOUNTER
gabapentin (NEURONTIN) 300 MG capsule  Requested Prescriptions   Last Written Prescription Date:  7/2/18  Last Fill Quantity: 90,  # refills: 0   Last office visit: 7/12/2018 with prescribing provider:     Future Office Visit:   Next 5 appointments (look out 90 days)     Aug 09, 2018 11:20 AM CDT   Return Visit with Maine Fox MD   [facility] ([facility])    [address]  [address]  [facility] [address]   [phone]                    Pending Prescriptions Disp Refills     gabapentin (NEURONTIN) 300 MG capsule 90 capsule 0     Sig: Take 1 capsule (300 mg) by mouth 3 times daily    There is no refill protocol information for this order

## 2018-08-02 NOTE — TELEPHONE ENCOUNTER
-Writer reviewed pt's chart and it appears gabapentin was refilled by another provider. Called pt at the number provided and confirmed that she still needs refills of the Wellbutrin and Prozac. She also requested a refill of clonidine.    Last seen: 6/7/18  RTC: 6 weeks  Cancel: 7/19/18  No-show: none  Next appt: 8/9/18     Medication requested: buPROPion (WELLBUTRIN SR) 100 MG 12 hr tablet   Directions: Take 1 tablet BID (take 6 hours apart and second dose should not be taken after 4:00 pm)  Qty: 60  Last refilled: 7/3/18 per med tab    Medication requested: FLUoxetine (PROZAC) 40 MG capsule  Directions: Take 1 capsule (40 mg) by mouth daily   Qty: 30  Last refilled: 7/3/18 per med tab    Medication requested: cloNIDine (CATAPRES) 0.1 MG tablet  Directions: Take 1-2 tablets (0.1-0.2 mg) by mouth 3 times daily as needed (anxiety)  Qty: 180  Last refilled: 7/3/18 per med tab      Despite cancellation, writer refilled medication for 30 d/s urgently as pt is out of medication and will be leaving town and to avoid stopping the medications

## 2018-08-02 NOTE — TELEPHONE ENCOUNTER
Medication Refill Request    Medication Requested: gabapentin 300 mg  Last Office Visit: 7/12/18  Labs: up to date  Last Refilled: 7/2/18  Next Office Visit: 8/9/18    Rx approved and refilled per [facility] refill protocol    Cheyenne Salinas RN  08/02/18  3:45 PM

## 2018-08-02 NOTE — TELEPHONE ENCOUNTER
FW: Care Coordination  Received: Today       Nery Henderson, Dara Eason RN       Phone Number: [phone] (Call me)                       Previous Messages       ----- Message -----      From: Mack Harrison      Sent: 8/2/2018  11:30 AM        To: Nery Henderson RN   Subject: Care Coordination                                 Caller: Self   Medication: Wellburtin/Prozac/Gabapentin   Pharmacy and location:  [facility] [place] [address] [facility]   [phone] (Phone)   [phone] (Fax)   Have you contacted the pharmacy: Yes   How much of medication do you have left: No   Pending appt: 8/9/18   Okay to leave VM:  Yes     Pt stated that she will leaving to go out of town this evening and that there is no [facility] where she will be going. Pt asked if the nurse or provider Vaughn could please call prescription refills into the pharmacy. For further information please contact pt.     Thanks

## 2018-08-09 ENCOUNTER — OFFICE VISIT (OUTPATIENT)
Dept: ADDICTION MEDICINE | Facility: CLINIC | Age: 38
End: 2018-08-09
Payer: COMMERCIAL

## 2018-08-09 VITALS
OXYGEN SATURATION: 100 % | WEIGHT: 166 LBS | DIASTOLIC BLOOD PRESSURE: 68 MMHG | HEART RATE: 58 BPM | SYSTOLIC BLOOD PRESSURE: 122 MMHG | BODY MASS INDEX: 26.79 KG/M2 | RESPIRATION RATE: 16 BRPM

## 2018-08-09 DIAGNOSIS — F31.89 OTHER BIPOLAR DISORDER (H): ICD-10-CM

## 2018-08-09 DIAGNOSIS — F11.20 OPIOID USE DISORDER, SEVERE, DEPENDENCE (H): Primary | ICD-10-CM

## 2018-08-09 DIAGNOSIS — N30.10 INTERSTITIAL CYSTITIS: ICD-10-CM

## 2018-08-09 DIAGNOSIS — F43.10 PTSD (POST-TRAUMATIC STRESS DISORDER): ICD-10-CM

## 2018-08-09 DIAGNOSIS — F60.3 BORDERLINE PERSONALITY DISORDER (H): ICD-10-CM

## 2018-08-09 LAB
AMPHETAMINES UR QL: NOT DETECTED NG/ML
BARBITURATES UR QL SCN: NOT DETECTED NG/ML
BENZODIAZ UR QL SCN: ABNORMAL NG/ML
BUPRENORPHINE UR QL: ABNORMAL NG/ML
CANNABINOIDS UR QL: NOT DETECTED NG/ML
COCAINE UR QL SCN: NOT DETECTED NG/ML
D-METHAMPHET UR QL: NOT DETECTED NG/ML
METHADONE UR QL SCN: NOT DETECTED NG/ML
OPIATES UR QL SCN: NOT DETECTED NG/ML
OXYCODONE UR QL SCN: NOT DETECTED NG/ML
PCP UR QL SCN: NOT DETECTED NG/ML
PROPOXYPH UR QL: NOT DETECTED NG/ML
TRICYCLICS UR QL SCN: NOT DETECTED NG/ML

## 2018-08-09 PROCEDURE — 99214 OFFICE O/P EST MOD 30 MIN: CPT | Performed by: PEDIATRICS

## 2018-08-09 PROCEDURE — 80306 DRUG TEST PRSMV INSTRMNT: CPT | Performed by: PEDIATRICS

## 2018-08-09 RX ORDER — BUPRENORPHINE AND NALOXONE 8; 2 MG/1; MG/1
1 FILM, SOLUBLE BUCCAL; SUBLINGUAL 3 TIMES DAILY
Qty: 90 FILM | Refills: 0 | Status: SHIPPED | OUTPATIENT
Start: 2018-08-09 | End: 2018-09-21

## 2018-08-09 RX ORDER — BUPRENORPHINE HYDROCHLORIDE AND NALOXONE HYDROCHLORIDE DIHYDRATE 8; 2 MG/1; MG/1
1 TABLET SUBLINGUAL 3 TIMES DAILY
Qty: 90 TABLET | Refills: 0 | Status: SHIPPED | OUTPATIENT
Start: 2018-08-09 | End: 2018-11-08

## 2018-08-09 NOTE — MR AVS SNAPSHOT
After Visit Summary   8/9/2018    Yara Edouard    MRN: [id number]           Patient Information     Date Of Birth          1980        Visit Information        Provider Department      8/9/2018 11:20 AM Maine Fox MD [facility]        Today's Diagnoses     Opioid use disorder, severe, dependence (H)    -  1    Other bipolar disorder (H)        PTSD (post-traumatic stress disorder)        Borderline personality disorder        Interstitial cystitis           Follow-ups after your visit        Your next 10 appointments already scheduled     Aug 09, 2018  1:45 PM CDT   Schizophrenia Follow Up with Satya Mendes MD   Psychiatry Clinic ([facility])    [address]  [address] [id number] [phone]            Sep 06, 2018  1:00 PM CDT   Return Visit with Maine Fox MD   [facility] ([facility])    [address] [place] [address]  [address]  [facility] [address] [phone]              Who to contact     If you have questions or need follow up information about today's clinic visit or your schedule please contact [facility] directly at [phone].  Normal or non-critical lab and imaging results will be communicated to you by MyChart, letter or phone within 4 business days after the clinic has received the results. If you do not hear from us within 7 days, please contact the clinic through [employer] [place] or phone. If you have a critical or abnormal lab result, we will notify you by phone as soon as possible.  Submit refill requests through [employer] or call your pharmacy and they will forward the refill request to us. Please allow 3 business days for your refill to be completed.          Additional Information About Your Visit        [employer] [place] Information     [employer] gives you secure access to your electronic  health record. If you see a primary care provider, you can also send messages to your care team and make appointments. If you have questions, please call your primary care clinic.  If you do not have a primary care provider, please call [phone] and they will assist you.        Care EveryWhere ID     This is your Care EveryWhere ID. This could be used by other organizations to access your [place] medical records  [id number]        Your Vitals Were     Pulse Respirations Pulse Oximetry BMI (Body Mass Index)          58 16 100% 26.79 kg/m2         Blood Pressure from Last 3 Encounters:   08/09/18 122/68   07/12/18 98/64   06/07/18 109/73    Weight from Last 3 Encounters:   08/09/18 166 lb (75.3 kg)   07/12/18 171 lb (77.6 kg)   06/07/18 166 lb 3.2 oz (75.4 kg)              We Performed the Following     Urine Drugs of Abuse Screen Panel 13          Today's Medication Changes          These changes are accurate as of 8/9/18 11:54 AM.  If you have any questions, ask your nurse or doctor.               These medicines have changed or have updated prescriptions.        Dose/Directions    * buprenorphine-naloxone 8-2 MG Subl sublingual tablet   Commonly known as:  SUBOXONE   This may have changed:  Another medication with the same name was added. Make sure you understand how and when to take each.   Used for:  Opioid use disorder, severe, dependence (H)   Changed by:  Maine Fox MD        Dose:  1 tablet   Place 1 tablet under the tongue 3 times daily   Quantity:  90 tablet   Refills:  0       * buprenorphine HCl-naloxone HCl 8-2 MG per film   Commonly known as:  SUBOXONE   This may have changed:  You were already taking a medication with the same name, and this prescription was added. Make sure you understand how and when to take each.   Used for:  Opioid use disorder, severe, dependence (H)   Changed by:  Maine Fox MD        Dose:  1 Film   Place 1 Film under the tongue 3 times daily    Quantity:  90 Film   Refills:  0       * Notice:  This list has 2 medication(s) that are the same as other medications prescribed for you. Read the directions carefully, and ask your doctor or other care provider to review them with you.         Where to get your medicines      Some of these will need a paper prescription and others can be bought over the counter.  Ask your nurse if you have questions.     Bring a paper prescription for each of these medications     buprenorphine HCl-naloxone HCl 8-2 MG per film    buprenorphine-naloxone 8-2 MG Subl sublingual tablet                Primary Care Provider Office Phone # Fax #    Neyda Ori Meza [phone]       [facility] [address]  [facility] [address]        Equal Access to Services     TRISTAN GRULLON : Hadii bibi lu hadasho Soomaali, waaxda luqadaha, qaybta kaalmada adeegyada, desiree hurd . So [facility] [phone].    ATENCIÓN: Si habla español, tiene a galeana disposición servicios gratuitos de asistencia lingüística. [facility] [phone].    We comply with applicable federal civil rights laws and [place] laws. We do not discriminate on the basis of race, color, national origin, age, disability, sex, sexual orientation, or gender identity.            Thank you!     Thank you for choosing [facility]  for your care. Our goal is always to provide you with excellent care. Hearing back from our patients is one way we can continue to improve our services. Please take a few minutes to complete the written survey that you may receive in the mail after your visit with us. Thank you!             Your Updated Medication List - Protect others around you: Learn how to safely use, store and throw away your medicines at www.disposemymeds.org.          This list is accurate as of 8/9/18 11:54 AM.  Always use your most recent med list.                   Brand Name Dispense  Instructions for use Diagnosis    7-KETO LEAN PO      Take 1 tablet by mouth daily        albuterol 108 (90 Base) MCG/ACT Inhaler    PROAIR HFA/PROVENTIL HFA/VENTOLIN HFA     Inhale 2 puffs into the lungs every 6 hours        ascorbic acid 1000 MG Tabs    vitamin C     Take 1,000 mg by mouth daily        * buprenorphine-naloxone 8-2 MG Subl sublingual tablet    SUBOXONE    90 tablet    Place 1 tablet under the tongue 3 times daily    Opioid use disorder, severe, dependence (H)       * buprenorphine HCl-naloxone HCl 8-2 MG per film    SUBOXONE    90 Film    Place 1 Film under the tongue 3 times daily    Opioid use disorder, severe, dependence (H)       buPROPion 100 MG 12 hr tablet    WELLBUTRIN SR    60 tablet    Take 1 tablet BID (take 6 hours apart and second dose should not be taken after 4:00 pm)    Other bipolar disorder (H)       Calcium-Magnesium-Zinc 167-83-8 MG Tabs      Take 1 tablet by mouth every morning        cholecalciferol 28707 units capsule    vitamin D3     Take 10,000 Units by mouth daily        chromium 200 MCG Caps capsule      Take 200 mcg by mouth daily        cloNIDine 0.1 MG tablet    CATAPRES    180 tablet    Take 1-2 tablets (0.1-0.2 mg) by mouth 3 times daily as needed (anxiety) Call clinic to schedule MD APPT    Chemical dependency (H)       DEPLIN 7.5 MG Tabs     30 tablet    Take 7.5 mg by mouth daily        DULCOLAX 5 MG EC tablet   Generic drug:  bisacodyl      Take 10 mg by mouth At Bedtime        fish oil-omega-3 fatty acids 1000 MG capsule      Take 1,000 mg by mouth 4 times daily        FLUoxetine 40 MG capsule    PROzac    30 capsule    Take 1 capsule (40 mg) by mouth daily    Recurrent major depressive disorder, in partial remission (H)       gabapentin 300 MG capsule    NEURONTIN    90 capsule    Take 1 capsule (300 mg) by mouth 3 times daily    Opioid use disorder, severe, dependence (H), Other bipolar disorder (H), PTSD (post-traumatic stress disorder)       ibuprofen 200  MG tablet    ADVIL/MOTRIN     Take 800 mg by mouth every 6 hours as needed        L-Theanine 100 MG Caps      Take 1 capsule by mouth 2 times daily as needed        lamoTRIgine 150 MG tablet    LaMICtal    60 tablet    Take 1 tablet (150 mg) by mouth 2 times daily    Other bipolar disorder (H)       Melatonin 10 MG Tabs tablet      Take 10 mg by mouth At Bedtime        omeprazole 20 MG CR capsule    priLOSEC    90 capsule    Take 1 capsule (20 mg) by mouth every morning    Chemical dependency (H)       propranolol 40 MG tablet    INDERAL    100 tablet    Take 1 tablet (40 mg) by mouth 4 times daily as needed for tremor    PTSD (post-traumatic stress disorder)       QUEtiapine 100 MG tablet    SEROquel    60 tablet    Take 1-2 tablets (100-200 mg) by mouth nightly as needed    PTSD (post-traumatic stress disorder)       sucralfate 1 GM tablet    CARAFATE     Take 1 g by mouth 2 times daily        * Notice:  This list has 2 medication(s) that are the same as other medications prescribed for you. Read the directions carefully, and ask your doctor or other care provider to review them with you.
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
## 2018-08-17 ENCOUNTER — MYC MEDICAL ADVICE (OUTPATIENT)
Dept: PSYCHIATRY | Facility: CLINIC | Age: 38
End: 2018-08-17

## 2018-08-17 ENCOUNTER — TELEPHONE (OUTPATIENT)
Dept: ADDICTION MEDICINE | Facility: CLINIC | Age: 38
End: 2018-08-17

## 2018-08-17 NOTE — TELEPHONE ENCOUNTER
Called patient back. Discussed not stopping suboxone for surgery, but to ensure that both the surgeon and the anesthesiologist both know that she is taking the suboxone.  In addition, we discussed if they did prescribe opiates, she should not stop taking her suboxone.  Patient verbalized understanding.

## 2018-08-17 NOTE — TELEPHONE ENCOUNTER
Pt called stating that she will be having bladder hydro-distention surgery while under anaesthesia and would like directions on what to do since she is currently on subx. Pt is requesting to talk directly to Dr. Fox.    Pt tel: [phone] (okay to leave a detailed message)    Danielle Valdez

## 2018-08-23 ENCOUNTER — OFFICE VISIT (OUTPATIENT)
Dept: PSYCHIATRY | Facility: CLINIC | Age: 38
End: 2018-08-23
Attending: PSYCHIATRY & NEUROLOGY
Payer: COMMERCIAL

## 2018-08-23 VITALS
SYSTOLIC BLOOD PRESSURE: 134 MMHG | HEART RATE: 73 BPM | DIASTOLIC BLOOD PRESSURE: 87 MMHG | WEIGHT: 170.4 LBS | BODY MASS INDEX: 27.5 KG/M2

## 2018-08-23 DIAGNOSIS — F19.20 CHEMICAL DEPENDENCY (H): ICD-10-CM

## 2018-08-23 DIAGNOSIS — F33.41 RECURRENT MAJOR DEPRESSIVE DISORDER, IN PARTIAL REMISSION (H): ICD-10-CM

## 2018-08-23 DIAGNOSIS — F31.89 OTHER BIPOLAR DISORDER (H): ICD-10-CM

## 2018-08-23 DIAGNOSIS — F43.10 PTSD (POST-TRAUMATIC STRESS DISORDER): Primary | ICD-10-CM

## 2018-08-23 PROCEDURE — G0463 HOSPITAL OUTPT CLINIC VISIT: HCPCS | Mod: ZF

## 2018-08-23 RX ORDER — CLONIDINE HYDROCHLORIDE 0.2 MG/1
0.2 TABLET ORAL 3 TIMES DAILY PRN
Qty: 90 TABLET | Refills: 3 | Status: SHIPPED | OUTPATIENT
Start: 2018-08-23 | End: 2019-01-28

## 2018-08-23 RX ORDER — FLUOXETINE 40 MG/1
40 CAPSULE ORAL DAILY
Qty: 30 CAPSULE | Refills: 3 | Status: SHIPPED | OUTPATIENT
Start: 2018-08-23 | End: 2019-02-22

## 2018-08-23 RX ORDER — BUPROPION HYDROCHLORIDE 300 MG/1
300 TABLET ORAL EVERY MORNING
Qty: 30 TABLET | Refills: 3 | Status: SHIPPED | OUTPATIENT
Start: 2018-08-23 | End: 2019-02-08

## 2018-08-23 ASSESSMENT — PAIN SCALES - GENERAL: PAINLEVEL: NO PAIN (0)
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
## 2018-08-23 NOTE — MR AVS SNAPSHOT
After Visit Summary   8/23/2018    Yara Edouard    MRN: [id number]           Patient Information     Date Of Birth          1980        Visit Information        Provider Department      8/23/2018 2:45 PM Satya Mendes MD Psychiatry Clinic        Today's Diagnoses     PTSD (post-traumatic stress disorder)    -  1    Other bipolar disorder (H)        Chemical dependency (H)        Recurrent major depressive disorder, in partial remission (H)           Follow-ups after your visit        Follow-up notes from your care team     Return in about 2 months (around 10/23/2018).      Your next 10 appointments already scheduled     Oct 17, 2018 11:20 AM CDT   Return Visit with Maine Fox MD   [facility] ([facility])    [address] [place] [address]  [address]  [facility] [address] [phone]              Who to contact     Please call your clinic at [phone] to:    Ask questions about your health    Make or cancel appointments    Discuss your medicines    Learn about your test results    Speak to your doctor            Additional Information About Your Visit        [employer] Information     [employer] gives you secure access to your electronic health record. If you see a primary care provider, you can also send messages to your care team and make appointments. If you have questions, please call your primary care clinic.  If you do not have a primary care provider, please call [phone] and they will assist you.      [employer] is an electronic gateway that provides easy, online access to your medical records. With [employer], you can request a clinic appointment, read your test results, renew a prescription or communicate with your care team.     To access your existing account, please contact your [facility] or call [phone] for assistance.        Care EveryWhere ID     This is your Care EveryWhere ID. This  could be used by other organizations to access your [facility] medical records  [id number]        Your Vitals Were     Pulse BMI (Body Mass Index)                73 27.5 kg/m2           Blood Pressure from Last 3 Encounters:   08/23/18 134/87   08/09/18 122/68   07/12/18 98/64    Weight from Last 3 Encounters:   08/23/18 77.3 kg (170 lb 6.4 oz)   08/09/18 75.3 kg (166 lb)   07/12/18 77.6 kg (171 lb)              Today, you had the following     No orders found for display         Today's Medication Changes          These changes are accurate as of 8/23/18 11:59 PM.  If you have any questions, ask your nurse or doctor.               Start taking these medicines.        Dose/Directions    buPROPion 300 MG 24 hr tablet   Commonly known as:  WELLBUTRIN XL   Used for:  Other bipolar disorder (H)   Replaces:  buPROPion 100 MG 12 hr tablet   Started by:  Satya Mendes MD        Dose:  300 mg   Take 1 tablet (300 mg) by mouth every morning   Quantity:  30 tablet   Refills:  3         These medicines have changed or have updated prescriptions.        Dose/Directions    cloNIDine 0.2 MG tablet   Commonly known as:  CATAPRES   This may have changed:    - medication strength  - how much to take   Used for:  Chemical dependency (H)   Changed by:  Satya Mendes MD        Dose:  0.2 mg   Take 1 tablet (0.2 mg) by mouth 3 times daily as needed (anxiety)   Quantity:  90 tablet   Refills:  3         Stop taking these medicines if you haven't already. Please contact your care team if you have questions.     buPROPion 100 MG 12 hr tablet   Commonly known as:  WELLBUTRIN SR   Replaced by:  buPROPion 300 MG 24 hr tablet   Stopped by:  Satya Mendes MD                Where to get your medicines      These medications were sent to [facility] [address] [place] [address] & [address] [address]     Phone:  [phone]     buPROPion 300 MG 24 hr tablet     cloNIDine 0.2 MG tablet    FLUoxetine 40 MG capsule                Primary Care Provider Office Phone # Fax #    Neyda Ori Meza [phone]       [facility] [address]  [facility] [address]        Equal Access to Services     TRISTAN GRULLON : Hadii bibi ku hadasho Soomaali, waaxda luqadaha, qaybta kaalmada adeegyada, waxay atul haypatrian africa lavonnetessie acuna. So [facility] [phone].    ATENCIÓN: Si habla español, tiene a galeana disposición servicios gratuitos de asistencia lingüística. Jese al [phone].    We comply with applicable federal civil rights laws and [place] laws. We do not discriminate on the basis of race, color, national origin, age, disability, sex, sexual orientation, or gender identity.            Thank you!     Thank you for choosing PS[facility]ATRY CLINIC  for your care. Our goal is always to provide you with excellent care. Hearing back from our patients is one way we can continue to improve our services. Please take a few minutes to complete the written survey that you may receive in the mail after your visit with us. Thank you!             Your Updated Medication List - Protect others around you: Learn how to safely use, store and throw away your medicines at www.disposemymeds.org.          This list is accurate as of 8/23/18 11:59 PM.  Always use your most recent med list.                   Brand Name Dispense Instructions for use Diagnosis    7-KETO LEAN PO      Take 1 tablet by mouth daily        albuterol 108 (90 Base) MCG/ACT inhaler    PROAIR HFA/PROVENTIL HFA/VENTOLIN HFA     Inhale 2 puffs into the lungs every 6 hours        ascorbic acid 1000 MG Tabs    vitamin C     Take 1,000 mg by mouth daily        buprenorphine-naloxone 8-2 MG Subl sublingual tablet    SUBOXONE    90 tablet    Place 1 tablet under the tongue 3 times daily    Opioid use disorder, severe, dependence (H)       buPROPion 300 MG 24 hr tablet    WELLBUTRIN XL    30 tablet     Take 1 tablet (300 mg) by mouth every morning    Other bipolar disorder (H)       Calcium-Magnesium-Zinc 167-83-8 MG Tabs      Take 1 tablet by mouth every morning        cholecalciferol 90894 units capsule    vitamin D3     Take 10,000 Units by mouth daily        chromium 200 MCG Caps capsule      Take 200 mcg by mouth daily        cloNIDine 0.2 MG tablet    CATAPRES    90 tablet    Take 1 tablet (0.2 mg) by mouth 3 times daily as needed (anxiety)    Chemical dependency (H)       DEPLIN 7.5 MG Tabs     30 tablet    Take 7.5 mg by mouth daily        DULCOLAX 5 MG EC tablet   Generic drug:  bisacodyl      Take 10 mg by mouth At Bedtime        fish oil-omega-3 fatty acids 1000 MG capsule      Take 1,000 mg by mouth 4 times daily        FLUoxetine 40 MG capsule    PROzac    30 capsule    Take 1 capsule (40 mg) by mouth daily    Recurrent major depressive disorder, in partial remission (H)       ibuprofen 200 MG tablet    ADVIL/MOTRIN     Take 800 mg by mouth every 6 hours as needed        L-Theanine 100 MG Caps      Take 1 capsule by mouth 2 times daily as needed        lamoTRIgine 150 MG tablet    LaMICtal    60 tablet    Take 1 tablet (150 mg) by mouth 2 times daily    Other bipolar disorder (H)       Melatonin 10 MG Tabs tablet      Take 10 mg by mouth At Bedtime        omeprazole 20 MG CR capsule    priLOSEC    90 capsule    Take 1 capsule (20 mg) by mouth every morning    Chemical dependency (H)       propranolol 40 MG tablet    INDERAL    100 tablet    Take 1 tablet (40 mg) by mouth 4 times daily as needed for tremor    PTSD (post-traumatic stress disorder)       QUEtiapine 100 MG tablet    SEROquel    60 tablet    Take 1-2 tablets (100-200 mg) by mouth nightly as needed    PTSD (post-traumatic stress disorder)       sucralfate 1 GM tablet    CARAFATE     Take 1 g by mouth 2 times daily

## 2018-09-04 ENCOUNTER — TELEPHONE (OUTPATIENT)
Dept: ADDICTION MEDICINE | Facility: CLINIC | Age: 38
End: 2018-09-04

## 2018-09-04 DIAGNOSIS — F31.89 OTHER BIPOLAR DISORDER (H): ICD-10-CM

## 2018-09-04 DIAGNOSIS — F11.20 OPIOID USE DISORDER, SEVERE, DEPENDENCE (H): ICD-10-CM

## 2018-09-04 DIAGNOSIS — F43.10 PTSD (POST-TRAUMATIC STRESS DISORDER): ICD-10-CM

## 2018-09-04 NOTE — TELEPHONE ENCOUNTER
gabapentin (NEURONTIN) 300 MG capsule  Requested Prescriptions  Last Written Prescription Date:  8/2/18  Last Fill Quantity: 90,  # refills: 0   Last office visit: 8/9/2018 with prescribing provider:     Future Office Visit:   Next 5 appointments (look out 90 days)     Sep 06, 2018  1:00 PM CDT   Return Visit with Maine Fox MD   [facility] ([facility])    [address]  [address]  [facility] [address] [phone]                    Pending Prescriptions Disp Refills     gabapentin (NEURONTIN) 300 MG capsule 90 capsule 0     Sig: Take 1 capsule (300 mg) by mouth 3 times daily    There is no refill protocol information for this order

## 2018-09-05 RX ORDER — GABAPENTIN 300 MG/1
300 CAPSULE ORAL 3 TIMES DAILY
Qty: 90 CAPSULE | Refills: 0 | Status: SHIPPED | OUTPATIENT
Start: 2018-09-05 | End: 2018-10-15

## 2018-09-05 NOTE — TELEPHONE ENCOUNTER
Refill for: Gabapentin     Last Appointment: 08/09/2018    Next Appointment: 09/14/2018    No Shows/Cancellations since last appointment:  Cancelled 9/6/18    Last Refill in Epic (date and amount/how many days): 30 days  gabapentin (NEURONTIN) 300 MG capsule 90 capsule 0 8/2/2018  No   Sig - Route: Take 1 capsule (300 mg) by mouth 3 times daily - Oral   Class: E-Prescribe   Order: [id number]   E-Prescribing Status: Receipt confirmed by pharmacy (8/2/2018  3:49 PM CDT)         Most Recent UDS results: 8/9/2018 POS for Benzos and Burpropion     reviewed and summarized below:  8/18/18 Suboxone 8-2 mg QTY 60 for 20  8/9/18 Suboxone 8-2 mg QTY 30 for 10   8/2/18 Gabapentin 300 mg QTY 90 for 30

## 2018-09-20 ENCOUNTER — TELEPHONE (OUTPATIENT)
Dept: ADDICTION MEDICINE | Facility: CLINIC | Age: 38
End: 2018-09-20

## 2018-09-20 DIAGNOSIS — F11.20 OPIOID USE DISORDER, SEVERE, DEPENDENCE (H): ICD-10-CM

## 2018-09-20 NOTE — TELEPHONE ENCOUNTER
Pt called to schedule appt w/ Ruddy. Pt took soonest appt 10/17/18 @ 11:20. Pt stated that she will be out of her subx starting 9/22/18 with none to take in the am.     [facility] tel: [phone]    Pt tel: [phone] (okay to leave a detailed message)    Danielle Valdez

## 2018-09-21 RX ORDER — BUPRENORPHINE AND NALOXONE 8; 2 MG/1; MG/1
1 FILM, SOLUBLE BUCCAL; SUBLINGUAL 3 TIMES DAILY
Qty: 78 FILM | Refills: 0 | Status: SHIPPED | OUTPATIENT
Start: 2018-09-21 | End: 2018-10-16

## 2018-09-21 NOTE — TELEPHONE ENCOUNTER
Patient reports that she has been taking less of her Suboxone because she has had to miss her appointment and she was taking percocet.        Patient would like to continue taking 3 doses per day now that surgery has passed and she is no longer taking percocet.    Will forward to provider for review.

## 2018-09-21 NOTE — TELEPHONE ENCOUNTER
Refill for: Suboxone    Last Appointment: 08.09.20018    Next Appointment: 10.17.2018    No Shows/Cancellations since last appointment:  2 cancellations - 09.06.2018, 09.14.2018    Last Refill in Epic (date and amount/how many days): 08.09.2018  90 tabs for 30 days    Most Recent UDS results: 08.09.2018  Positive BUP, Benzos     reviewed and summarized below:    09.12.2018  Oxycodone-Aceta.  25 tabs for 3 days    09.05.2018  Gabapentin 90 caps for 30 days    08.18.2018  Suboxone 60 films for 20 days    09.09.2018  Suboxone 30 films for 10 days.    Left VM for patient to return call to discuss  and Suboxone dosing.    Will forward to provider for review.

## 2018-10-15 ENCOUNTER — TELEPHONE (OUTPATIENT)
Dept: ADDICTION MEDICINE | Facility: CLINIC | Age: 38
End: 2018-10-15

## 2018-10-15 DIAGNOSIS — F31.89 OTHER BIPOLAR DISORDER (H): ICD-10-CM

## 2018-10-15 DIAGNOSIS — F43.10 PTSD (POST-TRAUMATIC STRESS DISORDER): ICD-10-CM

## 2018-10-15 DIAGNOSIS — F11.20 OPIOID USE DISORDER, SEVERE, DEPENDENCE (H): ICD-10-CM

## 2018-10-15 NOTE — TELEPHONE ENCOUNTER
gabapentin (NEURONTIN) 300 MG capsule  Requested Prescriptions  Last Written Prescription Date:  9/5/18  Last Fill Quantity: 90,  # refills: 0   Last office visit: 8/9/2018 with prescribing provider:     Future Office Visit:   Next 5 appointments (look out 90 days)     Oct 17, 2018 11:20 AM CDT   Return Visit with Maine Fox MD   [facility] ([facility])    [address] [facility] [address]  [address]  [facility] [address]   [phone]                    Pending Prescriptions Disp Refills     gabapentin (NEURONTIN) 300 MG capsule 90 capsule 0     Sig: Take 1 capsule (300 mg) by mouth 3 times daily    There is no refill protocol information for this order

## 2018-10-16 DIAGNOSIS — F11.20 OPIOID USE DISORDER, SEVERE, DEPENDENCE (H): ICD-10-CM

## 2018-10-16 RX ORDER — GABAPENTIN 300 MG/1
300 CAPSULE ORAL 3 TIMES DAILY
Qty: 90 CAPSULE | Refills: 0 | Status: SHIPPED | OUTPATIENT
Start: 2018-10-16 | End: 2018-12-06

## 2018-10-16 RX ORDER — BUPRENORPHINE AND NALOXONE 8; 2 MG/1; MG/1
1 FILM, SOLUBLE BUCCAL; SUBLINGUAL 3 TIMES DAILY
Qty: 33 FILM | Refills: 0 | Status: SHIPPED | OUTPATIENT
Start: 2018-10-22 | End: 2018-11-08

## 2018-10-16 NOTE — TELEPHONE ENCOUNTER
Refill for:  Gabapentin     Last Appointment: 8/9/18    Next Appointment: 11/1/2018    No Shows/Cancellations since last appointment:  Cancelled by clinic 10/17/2018, cancel 9/17, and 9/6    Last Refill in Epic (date and amount/how many days):   buprenorphine HCl-naloxone HCl (SUBOXONE) 8-2 MG per film 78 Film 0 9/21/2018  No   Sig - Route: Place 1 Film under the tongue 3 times daily - Sublingual   Class: Local Print   Order: [id number]        Most Recent UDS results: 8/9/18 POS for Benzos and Buprenorphine     reviewed and summarized below:  9/25/018 Suboxone 8-2 mg QTY 78 for 26 days  9/12/2018 Oxycodone 5 Tylenol 325 mg QTY 25 for 3 days ([facility])  10/5/2018  Gabapentin 300 mg QTY 90 for 30 days

## 2018-10-16 NOTE — TELEPHONE ENCOUNTER
Pt returned call to clinic. Pt is now scheduled for first available Dr. Tg walter which was on 11/1/18 @ 4:00. Pt was informed multiple times that due to her appt being at 4 she can not be late. Pt stated she will not be late. Pt also mentioned that she will have no meds to take starting the morning of 10/22. Pt will need a bridge to her new appt. Preferred pharmacy: [facility] tel: [phone].    Pt tel: [phone] (okay to leave a detailed message)    Danielle Valdez

## 2018-10-16 NOTE — TELEPHONE ENCOUNTER
Called and left message for patient to see if she would be able to reschedule appt for tomorrow due to provider need to be out of office.  Asked her to call office and reschedule and give pharmacy choice and will bridge until new appt.

## 2018-11-08 ENCOUNTER — OFFICE VISIT (OUTPATIENT)
Dept: ADDICTION MEDICINE | Facility: CLINIC | Age: 38
End: 2018-11-08
Payer: COMMERCIAL

## 2018-11-08 VITALS
SYSTOLIC BLOOD PRESSURE: 98 MMHG | BODY MASS INDEX: 26.79 KG/M2 | HEART RATE: 55 BPM | DIASTOLIC BLOOD PRESSURE: 62 MMHG | WEIGHT: 166 LBS | TEMPERATURE: 98.3 F | RESPIRATION RATE: 14 BRPM | OXYGEN SATURATION: 100 %

## 2018-11-08 DIAGNOSIS — F60.3 BORDERLINE PERSONALITY DISORDER (H): ICD-10-CM

## 2018-11-08 DIAGNOSIS — N30.10 INTERSTITIAL CYSTITIS: ICD-10-CM

## 2018-11-08 DIAGNOSIS — F31.89 OTHER BIPOLAR DISORDER (H): ICD-10-CM

## 2018-11-08 DIAGNOSIS — R41.840 UNABLE TO CONCENTRATE: ICD-10-CM

## 2018-11-08 DIAGNOSIS — F11.20 OPIOID USE DISORDER, SEVERE, DEPENDENCE (H): Primary | ICD-10-CM

## 2018-11-08 DIAGNOSIS — F43.10 PTSD (POST-TRAUMATIC STRESS DISORDER): ICD-10-CM

## 2018-11-08 LAB
AMPHETAMINES UR QL: NOT DETECTED NG/ML
BARBITURATES UR QL SCN: NOT DETECTED NG/ML
BENZODIAZ UR QL SCN: ABNORMAL NG/ML
BUPRENORPHINE UR QL: ABNORMAL NG/ML
CANNABINOIDS UR QL: NOT DETECTED NG/ML
COCAINE UR QL SCN: ABNORMAL NG/ML
D-METHAMPHET UR QL: NOT DETECTED NG/ML
METHADONE UR QL SCN: NOT DETECTED NG/ML
OPIATES UR QL SCN: NOT DETECTED NG/ML
OXYCODONE UR QL SCN: NOT DETECTED NG/ML
PCP UR QL SCN: NOT DETECTED NG/ML
PROPOXYPH UR QL: NOT DETECTED NG/ML
TRICYCLICS UR QL SCN: NOT DETECTED NG/ML

## 2018-11-08 PROCEDURE — 80321 ALCOHOLS BIOMARKERS 1OR 2: CPT | Mod: 90 | Performed by: PEDIATRICS

## 2018-11-08 PROCEDURE — 80306 DRUG TEST PRSMV INSTRMNT: CPT | Performed by: PEDIATRICS

## 2018-11-08 PROCEDURE — 99215 OFFICE O/P EST HI 40 MIN: CPT | Performed by: PEDIATRICS

## 2018-11-08 PROCEDURE — 99000 SPECIMEN HANDLING OFFICE-LAB: CPT | Performed by: PEDIATRICS

## 2018-11-08 PROCEDURE — 36415 COLL VENOUS BLD VENIPUNCTURE: CPT | Performed by: PEDIATRICS

## 2018-11-08 RX ORDER — BUPRENORPHINE AND NALOXONE 8; 2 MG/1; MG/1
1 FILM, SOLUBLE BUCCAL; SUBLINGUAL 2 TIMES DAILY
Qty: 14 FILM | Refills: 0 | Status: SHIPPED | OUTPATIENT
Start: 2018-11-08 | End: 2018-11-23

## 2018-11-08 NOTE — MR AVS SNAPSHOT
After Visit Summary   11/8/2018    Yara Edouard    MRN: [id number]           Patient Information     Date Of Birth          1980        Visit Information        Provider Department      11/8/2018 2:00 PM Maine Fox MD [facility]        Today's Diagnoses     Opioid use disorder, severe, dependence (H)    -  1    Other bipolar disorder (H)        PTSD (post-traumatic stress disorder)        Borderline personality disorder (H)        Interstitial cystitis        Unable to concentrate           Follow-ups after your visit        Additional Services     MENTAL HEALTH REFERRAL  - Adult; Assessments and Testing; Neuropsychological Testing; Other: [facility] [phone]; We will contact you to schedule the appointment or please call with any questions       All scheduling is subject to the client's specific insurance plan & benefits, provider/location availability, and provider clinical specialities.  Please arrive 15 minutes early for your first appointment and bring your completed paperwork.    Please be aware that coverage of these services is subject to the terms and limitations of your health insurance plan.  Call member services at your health plan with any benefit or coverage questions.                            Your next 10 appointments already scheduled     Nov 15, 2018  2:00 PM CST   Return Visit with Maine Fox MD   [facility] [place] [place]    [address] [place] [address]  [address]  [facility] [address] [phone]              Who to contact     If you have questions or need follow up information about today's clinic visit or your schedule please contact [facility] directly at [phone].  Normal or non-critical lab and imaging results will be communicated to you by MyChart, letter or phone within 4 business days after the clinic  has received the results. If you do not hear from us within 7 days, please contact the clinic through Donuts or phone. If you have a critical or abnormal lab result, we will notify you by phone as soon as possible.  Submit refill requests through Donuts or call your pharmacy and they will forward the refill request to us. Please allow 3 business days for your refill to be completed.          Additional Information About Your Visit        Donuts Information     Donuts gives you secure access to your electronic health record. If you see a primary care provider, you can also send messages to your care team and make appointments. If you have questions, please call your primary care clinic.  If you do not have a primary care provider, please call [phone] and they will assist you.        Care EveryWhere ID     This is your Care EveryWhere ID. This could be used by other organizations to access your [facility] medical records  [id number]        Your Vitals Were     Pulse Temperature Respirations Pulse Oximetry BMI (Body Mass Index)       55 98.3  F (36.8  C) (Oral) 14 100% 26.79 kg/m2        Blood Pressure from Last 3 Encounters:   11/08/18 98/62   08/23/18 134/87   08/09/18 122/68    Weight from Last 3 Encounters:   11/08/18 166 lb (75.3 kg)   08/23/18 170 lb 6.4 oz (77.3 kg)   08/09/18 166 lb (75.3 kg)              We Performed the Following     Benzodiazepine Confirmatory Urine Qual     Ethyl Glucuronide Urine     MENTAL HEALTH REFERRAL  - Adult; Assessments and Testing; Neuropsychological Testing; Other: [facility] [phone]; We will contact you to schedule the appointment or please call with any questions     Urine Drugs of Abuse Screen Panel 13          Today's Medication Changes          These changes are accurate as of 11/8/18 11:59 PM.  If you have any questions, ask your nurse or doctor.               These medicines have changed or have updated prescriptions.         Dose/Directions    buprenorphine HCl-naloxone HCl 8-2 MG per film   Commonly known as:  SUBOXONE   This may have changed:    - when to take this  - Another medication with the same name was removed. Continue taking this medication, and follow the directions you see here.   Used for:  Opioid use disorder, severe, dependence (H)   Changed by:  Maine Fox MD        Dose:  1 Film   Place 1 Film under the tongue 2 times daily   Quantity:  14 Film   Refills:  0            Where to get your medicines      Some of these will need a paper prescription and others can be bought over the counter.  Ask your nurse if you have questions.     Bring a paper prescription for each of these medications     buprenorphine HCl-naloxone HCl 8-2 MG per film                Primary Care Provider Office Phone # Fax #    Neyda Ori Meza [phone]       [place] [address]  [facility] [address]        Equal Access to Services     [facility]: Hadii aad ku hadasho Soomaali, waaxda luqadaha, qaybta kaalmada adeegyada, waxay randin hayaan africa hurd . So [facility] [phone].    ATENCIÓN: Si habla español, tiene a galeana disposición servicios gratuitos de asistencia lingüística. Jese al [phone].    We comply with applicable federal civil rights laws and [place] laws. We do not discriminate on the basis of race, color, national origin, age, disability, sex, sexual orientation, or gender identity.            Thank you!     Thank you for choosing [facility]  for your care. Our goal is always to provide you with excellent care. Hearing back from our patients is one way we can continue to improve our services. Please take a few minutes to complete the written survey that you may receive in the mail after your visit with us. Thank you!             Your Updated Medication List - Protect others around you: Learn how to safely use, store and throw away  your medicines at www.disposemymeds.org.          This list is accurate as of 11/8/18 11:59 PM.  Always use your most recent med list.                   Brand Name Dispense Instructions for use Diagnosis    7-KETO LEAN PO      Take 1 tablet by mouth daily        albuterol 108 (90 Base) MCG/ACT inhaler    PROAIR HFA/PROVENTIL HFA/VENTOLIN HFA     Inhale 2 puffs into the lungs every 6 hours        ascorbic acid 1000 MG Tabs    vitamin C     Take 1,000 mg by mouth daily        buprenorphine HCl-naloxone HCl 8-2 MG per film    SUBOXONE    14 Film    Place 1 Film under the tongue 2 times daily    Opioid use disorder, severe, dependence (H)       buPROPion 300 MG 24 hr tablet    WELLBUTRIN XL    30 tablet    Take 1 tablet (300 mg) by mouth every morning    Other bipolar disorder (H)       Calcium-Magnesium-Zinc 167-83-8 MG Tabs      Take 1 tablet by mouth every morning        cholecalciferol 55656 units capsule    vitamin D3     Take 10,000 Units by mouth daily        chromium 200 MCG Caps capsule      Take 200 mcg by mouth daily        cloNIDine 0.2 MG tablet    CATAPRES    90 tablet    Take 1 tablet (0.2 mg) by mouth 3 times daily as needed (anxiety)    Chemical dependency (H)       DEPLIN 7.5 MG Tabs     30 tablet    Take 7.5 mg by mouth daily        DULCOLAX 5 MG EC tablet   Generic drug:  bisacodyl      Take 10 mg by mouth At Bedtime        fish oil-omega-3 fatty acids 1000 MG capsule      Take 1,000 mg by mouth 4 times daily        FLUoxetine 40 MG capsule    PROzac    30 capsule    Take 1 capsule (40 mg) by mouth daily    Recurrent major depressive disorder, in partial remission (H)       gabapentin 300 MG capsule    NEURONTIN    90 capsule    Take 1 capsule (300 mg) by mouth 3 times daily    Opioid use disorder, severe, dependence (H), Other bipolar disorder (H), PTSD (post-traumatic stress disorder)       ibuprofen 200 MG tablet    ADVIL/MOTRIN     Take 800 mg by mouth every 6 hours as needed        L-Theanine  100 MG Caps      Take 1 capsule by mouth 2 times daily as needed        lamoTRIgine 150 MG tablet    LaMICtal    60 tablet    Take 1 tablet (150 mg) by mouth 2 times daily    Other bipolar disorder (H)       Melatonin 10 MG Tabs tablet      Take 10 mg by mouth At Bedtime        omeprazole 20 MG CR capsule    priLOSEC    90 capsule    Take 1 capsule (20 mg) by mouth every morning    Chemical dependency (H)       propranolol 40 MG tablet    INDERAL    100 tablet    Take 1 tablet (40 mg) by mouth 4 times daily as needed for tremor    PTSD (post-traumatic stress disorder)       QUEtiapine 100 MG tablet    SEROquel    60 tablet    Take 1-2 tablets (100-200 mg) by mouth nightly as needed    PTSD (post-traumatic stress disorder)       sucralfate 1 GM tablet    CARAFATE     Take 1 g by mouth 2 times daily
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
## 2018-11-14 LAB
A-OH ALPRAZ UR QL CFM: NORMAL
ALPRAZ UR QL CFM: POSITIVE
ETHYL GLUCURONIDE UR QL: NEGATIVE
LORAZEPAM UR QL CFM: NEGATIVE
NORDIAZEPAM UR QL CFM: NEGATIVE
OXAZEPAM UR QL CFM: NEGATIVE
TEMAZEPAM UR QL CFM: NEGATIVE

## 2018-11-16 ENCOUNTER — OFFICE VISIT (OUTPATIENT)
Dept: ADDICTION MEDICINE | Facility: CLINIC | Age: 38
End: 2018-11-16
Payer: COMMERCIAL

## 2018-11-16 ENCOUNTER — TELEPHONE (OUTPATIENT)
Dept: ADDICTION MEDICINE | Facility: CLINIC | Age: 38
End: 2018-11-16

## 2018-11-16 DIAGNOSIS — Z53.9 NO SHOW: Primary | ICD-10-CM

## 2018-11-16 PROCEDURE — 99207 ZZC NO SHOW FOR SCHEDULED APPT: CPT | Performed by: PEDIATRICS

## 2018-11-16 NOTE — TELEPHONE ENCOUNTER
Pt called regarding the fact that she no showed her appt today with Ruddy due to oversleeping. Pt is rescheduled for next available appt- 12/5/18 @ 11:20. Pt stated that she will be out with none to take starting 11/20/18. Pt is requesting a bridge to get her to her next appt.     Preferred [facility]: [facility] tel: [phone]    Pt tel: [phone] (okay to leave a detailed message)    Danielle Valdez

## 2018-11-16 NOTE — PROGRESS NOTES
[place]   10/17/18 Suboxone 8mg film #33      This patient was a no show for this scheduled appointment.

## 2018-11-16 NOTE — MR AVS SNAPSHOT
After Visit Summary   11/16/2018    Yara Edouard    MRN: [id number]           Patient Information     Date Of Birth          1980        Visit Information        Provider Department      11/16/2018 10:40 AM [place] Fox [place] [facility] [place]        Today's Diagnoses     NO SHOW    -  1       Follow-ups after your visit        Who to contact     If you have questions or need follow up information about today's clinic visit or your schedule please contact [facility] directly at [phone].  Normal or non-critical lab and imaging results will be communicated to you by [employer], letter or phone within 4 business days after the clinic has received the results. If you do not hear from us within 7 days, please contact the clinic through [employer] or phone. If you have a critical or abnormal lab result, we will notify you by phone as soon as possible.  Submit refill requests through [employer] or call your pharmacy and they will forward the refill request to us. Please allow 3 business days for your refill to be completed.          Additional Information About Your Visit        MyChart Information     [employer] gives you secure access to your electronic health record. If you see a primary care provider, you can also send messages to your care team and make appointments. If you have questions, please call your primary care clinic.  If you do not have a primary care provider, please call [phone] and they will assist you.        Care EveryWhere ID     This is your Care EveryWhere ID. This could be used by other organizations to access your [place] medical records  [id number]         Blood Pressure from Last 3 Encounters:   11/08/18 98/62   08/23/18 134/87   08/09/18 122/68    Weight from Last 3 Encounters:   11/08/18 166 lb (75.3 kg)   08/23/18 170 lb 6.4 oz (77.3 kg)   08/09/18 166 lb (75.3 kg)              Today, you had the following     No  orders found for display       Primary Care Provider Office Phone # Fax #    Neyda Ori Meza [phone]       Counts include 234 beds at the [facility] [address]  [facility] [address]        Equal Access to Services     TRISTAN GRULLON : Hadii aad ku hadguso Soomaali, waaxda luqadaha, qaybta kaalmada adeegyada, waxdelta randin haypatrian jensayesha baca vickey acuna. So [facility] [phone].    ATENCIÓN: Si habla español, tiene a galeana disposición servicios gratuitos de asistencia lingüística. Llame al [phone].    We comply with applicable federal civil rights laws and [place] laws. We do not discriminate on the basis of race, color, national origin, age, disability, sex, sexual orientation, or gender identity.            Thank you!     Thank you for choosing [facility] PRIMARY CARE  for your care. Our goal is always to provide you with excellent care. Hearing back from our patients is one way we can continue to improve our services. Please take a few minutes to complete the written survey that you may receive in the mail after your visit with us. Thank you!             Your Updated Medication List - Protect others around you: Learn how to safely use, store and throw away your medicines at www.disposemymeds.org.          This list is accurate as of 11/16/18 11:57 AM.  Always use your most recent med list.                   Brand Name Dispense Instructions for use Diagnosis    7-KETO LEAN PO      Take 1 tablet by mouth daily        albuterol 108 (90 Base) MCG/ACT inhaler    PROAIR HFA/PROVENTIL HFA/VENTOLIN HFA     Inhale 2 puffs into the lungs every 6 hours        ascorbic acid 1000 MG Tabs    vitamin C     Take 1,000 mg by mouth daily        buprenorphine HCl-naloxone HCl 8-2 MG per film    SUBOXONE    14 Film    Place 1 Film under the tongue 2 times daily    Opioid use disorder, severe, dependence (H)       buPROPion 300 MG 24 hr tablet    WELLBUTRIN XL    30 tablet    Take 1 tablet (300 mg) by  mouth every morning    Other bipolar disorder (H)       Calcium-Magnesium-Zinc 167-83-8 MG Tabs      Take 1 tablet by mouth every morning        cholecalciferol 53234 units capsule    vitamin D3     Take 10,000 Units by mouth daily        chromium 200 MCG Caps capsule      Take 200 mcg by mouth daily        cloNIDine 0.2 MG tablet    CATAPRES    90 tablet    Take 1 tablet (0.2 mg) by mouth 3 times daily as needed (anxiety)    Chemical dependency (H)       DEPLIN 7.5 MG Tabs     30 tablet    Take 7.5 mg by mouth daily        DULCOLAX 5 MG EC tablet   Generic drug:  bisacodyl      Take 10 mg by mouth At Bedtime        fish oil-omega-3 fatty acids 1000 MG capsule      Take 1,000 mg by mouth 4 times daily        FLUoxetine 40 MG capsule    PROzac    30 capsule    Take 1 capsule (40 mg) by mouth daily    Recurrent major depressive disorder, in partial remission (H)       gabapentin 300 MG capsule    NEURONTIN    90 capsule    Take 1 capsule (300 mg) by mouth 3 times daily    Opioid use disorder, severe, dependence (H), Other bipolar disorder (H), PTSD (post-traumatic stress disorder)       ibuprofen 200 MG tablet    ADVIL/MOTRIN     Take 800 mg by mouth every 6 hours as needed        L-Theanine 100 MG Caps      Take 1 capsule by mouth 2 times daily as needed        lamoTRIgine 150 MG tablet    LaMICtal    60 tablet    Take 1 tablet (150 mg) by mouth 2 times daily    Other bipolar disorder (H)       Melatonin 10 MG Tabs tablet      Take 10 mg by mouth At Bedtime        omeprazole 20 MG CR capsule    priLOSEC    90 capsule    Take 1 capsule (20 mg) by mouth every morning    Chemical dependency (H)       propranolol 40 MG tablet    INDERAL    100 tablet    Take 1 tablet (40 mg) by mouth 4 times daily as needed for tremor    PTSD (post-traumatic stress disorder)       QUEtiapine 100 MG tablet    SEROquel    60 tablet    Take 1-2 tablets (100-200 mg) by mouth nightly as needed    PTSD (post-traumatic stress disorder)        sucralfate 1 GM tablet    CARAFATE     Take 1 g by mouth 2 times daily

## 2018-11-19 DIAGNOSIS — F31.89 OTHER BIPOLAR DISORDER (H): ICD-10-CM

## 2018-11-19 RX ORDER — LAMOTRIGINE 150 MG/1
150 TABLET ORAL 2 TIMES DAILY
Qty: 60 TABLET | Refills: 0 | Status: SHIPPED | OUTPATIENT
Start: 2018-11-19 | End: 2019-01-09

## 2018-11-19 NOTE — TELEPHONE ENCOUNTER
Refill for: Suboxone    Last Appointment: 11.08.2018    Next Appointment: 12.05.2018    No Shows/Cancellations since last appointment:  No show 11.16.2018    Last Refill in Epic (date and amount/how many days): 11.08.2018  14 films for 7 days.    Most Recent UDS results: 11.08.2018  Positive BUP, Benzo, Cocaine    Confirmation test for Benzo was positive for alprazolam.  Confirmation test for cocaine was negative.     reviewed and summarized below:    11.09.2018  Suboxone 14 films for 7 days    Attempted to reach out to patient to determine if she is taking medication as prescribed.  Requested return call.    Will forward to provider after patient returns call.

## 2018-11-19 NOTE — TELEPHONE ENCOUNTER
Medication requested: lamoTRIgine (LAMICTAL) 150 MG tablet  Last refilled: 10/13/18  Qty: 60      Last seen: 8/23/18  RTC: 6 weeks  Cancel: 0  No-show: 0  Next appt: not scheduled    Refill decision:   30 day odilia refill sent to the pharmacy - including instructions for patient to call the clinic and schedule an appointment.

## 2018-11-23 ENCOUNTER — TELEPHONE (OUTPATIENT)
Dept: ADDICTION MEDICINE | Facility: CLINIC | Age: 38
End: 2018-11-23

## 2018-11-23 DIAGNOSIS — F11.20 OPIOID USE DISORDER, SEVERE, DEPENDENCE (H): ICD-10-CM

## 2018-11-23 RX ORDER — BUPRENORPHINE AND NALOXONE 8; 2 MG/1; MG/1
1 FILM, SOLUBLE BUCCAL; SUBLINGUAL 2 TIMES DAILY
Qty: 24 FILM | Refills: 0 | Status: SHIPPED | OUTPATIENT
Start: 2018-11-23 | End: 2018-12-05

## 2018-11-23 NOTE — TELEPHONE ENCOUNTER
Refill for: Suboxone     Last Appointment: 11.08.2018     Next Appointment: 12.05.2018     No Shows/Cancellations since last appointment:  No show 11.16.2018     Last Refill in Epic (date and amount/how many days): 11.08.2018  14 films for 7 days.     Most Recent UDS results: 11.08.2018  Positive BUP, Benzo, Cocaine     Confirmation test for Benzo was positive for alprazolam.  Confirmation test for cocaine was negative.      reviewed and summarized below:     11.09.2018  Suboxone 14 films for 7 days     Forwarding to provider to review and address refill request.

## 2018-11-23 NOTE — TELEPHONE ENCOUNTER
Rx faxed to:        [facility] - [place] [address] & [address] [address]  Phone: [phone] Fax: [phone]      Fax confirmation received. Hardcopy destroyed.     Juanita Craig CMA   [facility]

## 2018-12-05 ENCOUNTER — TELEPHONE (OUTPATIENT)
Dept: ADDICTION MEDICINE | Facility: CLINIC | Age: 38
End: 2018-12-05

## 2018-12-05 DIAGNOSIS — F11.20 OPIOID USE DISORDER, SEVERE, DEPENDENCE (H): ICD-10-CM

## 2018-12-05 RX ORDER — BUPRENORPHINE AND NALOXONE 8; 2 MG/1; MG/1
1 FILM, SOLUBLE BUCCAL; SUBLINGUAL 2 TIMES DAILY
Qty: 54 FILM | Refills: 0 | Status: SHIPPED | OUTPATIENT
Start: 2018-12-05 | End: 2019-01-16

## 2018-12-05 NOTE — TELEPHONE ENCOUNTER
Reason for Call:  Medication or medication refill:    Do you use a [facility]?  Name of the pharmacy and phone number for the current request:  [facility] [place]  Tel: [phone]    Name of the medication requested: Subx bridge     Other request: pt canceled her appt today, because her insurance is inactive. Pt's working on getting her insurance active again. Pt will run out of Subx on 12/8, and will need a bridge until her next appt 1/3/19.     Can we leave a detailed message on this number? YES    Phone number patient can be reached at: Home number on file [phone] (home)    Best Time: anytime     Call taken on 12/5/2018 at 8:41 AM by Dany Mckeon

## 2018-12-05 NOTE — TELEPHONE ENCOUNTER
Refill for: Suboxone    Last Appointment: 11/8/18    Next Appointment: 1/3/19    No Shows/Cancellations since last appointment:  Today due to lack of insurance, no show 11/16/18    Last Refill in Epic (date and amount/how many days):   buprenorphine HCl-naloxone HCl (SUBOXONE) 8-2 MG per film 24 Film 0 11/23/2018  No   Sig - Route: Place 1 Film under the tongue 2 times daily - Sublingual   Class: Local Print   Order: [id number]         Most Recent UDS results: 11/8/18 POS for Cocaine, Benzos, and Burprenorphine     reviewed and summarized below:     10/17/2018 2  10/17/2018 Suboxone 8 Mg-2 MG SL Film 33 11 Ei Bur [id number] [facility] ([id number]) 0 24.00 MG [place]  10/16/2018 2  10/16/2018 Gabapentin 300 MG Capsule 90 30 Ei Bur [id number] [facility] (5036) 0  [place]  09/25/2018 2  09/21/2018 Suboxone 8 Mg-2 MG SL Film 78 26 Ei Bur [id number] [facility] (1540) 0 24.00 [place]

## 2018-12-06 DIAGNOSIS — F11.20 OPIOID USE DISORDER, SEVERE, DEPENDENCE (H): ICD-10-CM

## 2018-12-06 DIAGNOSIS — F43.10 PTSD (POST-TRAUMATIC STRESS DISORDER): ICD-10-CM

## 2018-12-06 DIAGNOSIS — F31.89 OTHER BIPOLAR DISORDER (H): ICD-10-CM

## 2018-12-06 RX ORDER — GABAPENTIN 300 MG/1
300 CAPSULE ORAL 3 TIMES DAILY
Qty: 90 CAPSULE | Refills: 0 | Status: SHIPPED | OUTPATIENT
Start: 2018-12-06 | End: 2019-01-08

## 2018-12-06 NOTE — TELEPHONE ENCOUNTER
gabapentin (NEURONTIN) 300 MG capsule  Requested Prescriptions  Last Written Prescription Date:  10/16/18  Last Fill Quantity: 90,  # refills: 0   Last office visit: 11/8/2018 with prescribing provider:     Future Office Visit:   Next 5 appointments (look out 90 days)     Jan 03, 2019  1:00 PM CST   Return Visit with Maine Fox MD   [facility] ([facility])    [address] [facility]  [address]  [facility] [address]   [phone]                    Pending Prescriptions Disp Refills     gabapentin (NEURONTIN) 300 MG capsule 90 capsule 0     Sig: Take 1 capsule (300 mg) by mouth 3 times daily    There is no refill protocol information for this order

## 2018-12-06 NOTE — TELEPHONE ENCOUNTER
Refill Request for gabapentin (NEURONTIN) 300 MG capsule   Last refill: 10/16/18-- #90 for 30 day supply    Last clinic visit: 11/8/18   Next appt: 1/3/19      Documentation in problem list reviewed:  Yes  Processing:  electronically send to patient's pharmacy    [place]:  reviewed- no concerns      Cheyenne Salinas RN  12/06/18  11:20 AM

## 2019-01-07 ENCOUNTER — TELEPHONE (OUTPATIENT)
Dept: ADDICTION MEDICINE | Facility: CLINIC | Age: 39
End: 2019-01-07

## 2019-01-07 DIAGNOSIS — F31.89 OTHER BIPOLAR DISORDER (H): ICD-10-CM

## 2019-01-07 DIAGNOSIS — F43.10 PTSD (POST-TRAUMATIC STRESS DISORDER): ICD-10-CM

## 2019-01-07 DIAGNOSIS — F11.20 OPIOID USE DISORDER, SEVERE, DEPENDENCE (H): ICD-10-CM

## 2019-01-07 NOTE — TELEPHONE ENCOUNTER
Requested Prescriptions   Pending Prescriptions Disp Refills     gabapentin (NEURONTIN) 300 MG capsule Last Written Prescription Date:  12/6/18  Last Fill Quantity: 90,  # refills: 0   Last office visit: 11/8/2018 with prescribing provider:     Future Office Visit:   Next 5 appointments (look out 90 days)    Jan 09, 2019 10:00 AM CST  Return Visit with Maine Fox MD  [facility] ([facility]) [address] [facility]  [address]  [facility] [address]  [phone]        90 capsule 0     Sig: Take 1 capsule (300 mg) by mouth 3 times daily    There is no refill protocol information for this order

## 2019-01-08 ENCOUNTER — TELEPHONE (OUTPATIENT)
Dept: ADDICTION MEDICINE | Facility: CLINIC | Age: 39
End: 2019-01-08

## 2019-01-08 RX ORDER — GABAPENTIN 300 MG/1
300 CAPSULE ORAL 3 TIMES DAILY
Qty: 90 CAPSULE | Refills: 0 | Status: SHIPPED | OUTPATIENT
Start: 2019-01-08 | End: 2019-02-04

## 2019-01-08 NOTE — TELEPHONE ENCOUNTER
Call from Yara Edouard (psychology) she want to speak with Dr. Fox.  HARMONY fax today and is in pt chart.    Yara tel: [phone]      Dany Mckeon

## 2019-01-08 NOTE — TELEPHONE ENCOUNTER
Refill for: Gabapentin    Last Appointment: 11/8/18    Next Appointment: 1/9/19    No Shows/Cancellations since last appointment:  Cancel 1/3/19, cancel 12/5/18    Last Refill in Epic (date and amount/how many days):   gabapentin (NEURONTIN) 300 MG capsule 90 capsule 0 12/6/2018  No   Sig - Route: Take 1 capsule (300 mg) by mouth 3 times daily - Oral   Sent to pharmacy as: gabapentin (NEURONTIN) 300 MG capsule   Class: E-Prescribe   Order: [id number]   E-Prescribing Status: Receipt confirmed by pharmacy (12/6/2018  4:03 PM CST)         Most Recent UDS results: 11/8/18 POS for Cocaine, Benzodiazepine, and Buprenorphine

## 2019-01-09 ENCOUNTER — OFFICE VISIT (OUTPATIENT)
Dept: ADDICTION MEDICINE | Facility: CLINIC | Age: 39
End: 2019-01-09
Payer: COMMERCIAL

## 2019-01-09 VITALS
SYSTOLIC BLOOD PRESSURE: 110 MMHG | WEIGHT: 149.5 LBS | TEMPERATURE: 99.1 F | HEART RATE: 66 BPM | DIASTOLIC BLOOD PRESSURE: 76 MMHG | BODY MASS INDEX: 24.13 KG/M2

## 2019-01-09 DIAGNOSIS — F60.3 BORDERLINE PERSONALITY DISORDER (H): ICD-10-CM

## 2019-01-09 DIAGNOSIS — F11.20 OPIOID USE DISORDER, SEVERE, DEPENDENCE (H): ICD-10-CM

## 2019-01-09 DIAGNOSIS — F43.10 PTSD (POST-TRAUMATIC STRESS DISORDER): ICD-10-CM

## 2019-01-09 DIAGNOSIS — F13.10 BENZODIAZEPINE ABUSE (H): Primary | ICD-10-CM

## 2019-01-09 DIAGNOSIS — F31.89 OTHER BIPOLAR DISORDER (H): ICD-10-CM

## 2019-01-09 LAB
AMPHETAMINES UR QL: ABNORMAL NG/ML
BARBITURATES UR QL SCN: NOT DETECTED NG/ML
BENZODIAZ UR QL SCN: ABNORMAL NG/ML
BUPRENORPHINE UR QL: ABNORMAL NG/ML
CANNABINOIDS UR QL: NOT DETECTED NG/ML
COCAINE UR QL SCN: NOT DETECTED NG/ML
D-METHAMPHET UR QL: NOT DETECTED NG/ML
METHADONE UR QL SCN: NOT DETECTED NG/ML
OPIATES UR QL SCN: NOT DETECTED NG/ML
OXYCODONE UR QL SCN: NOT DETECTED NG/ML
PCP UR QL SCN: NOT DETECTED NG/ML
PROPOXYPH UR QL: NOT DETECTED NG/ML
TRICYCLICS UR QL SCN: NOT DETECTED NG/ML

## 2019-01-09 PROCEDURE — 99000 SPECIMEN HANDLING OFFICE-LAB: CPT | Performed by: PEDIATRICS

## 2019-01-09 PROCEDURE — 80346 BENZODIAZEPINES1-12: CPT | Mod: 90 | Performed by: PEDIATRICS

## 2019-01-09 PROCEDURE — 99215 OFFICE O/P EST HI 40 MIN: CPT | Performed by: PEDIATRICS

## 2019-01-09 PROCEDURE — 80306 DRUG TEST PRSMV INSTRMNT: CPT | Performed by: PEDIATRICS

## 2019-01-09 RX ORDER — LAMOTRIGINE 150 MG/1
TABLET ORAL
Qty: 60 TABLET | Refills: 0 | COMMUNITY
Start: 2019-01-09 | End: 2019-02-22
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
## 2019-01-09 NOTE — TELEPHONE ENCOUNTER
Brian madsen's therapist Conchis Santoro called,she would like a call back from you.    Conchis contact info: [phone]      Dany Mckeon

## 2019-01-13 ENCOUNTER — NURSE TRIAGE (OUTPATIENT)
Dept: NURSING | Facility: CLINIC | Age: 39
End: 2019-01-13

## 2019-01-13 ENCOUNTER — TELEPHONE (OUTPATIENT)
Dept: FAMILY MEDICINE | Facility: CLINIC | Age: 39
End: 2019-01-13

## 2019-01-13 NOTE — TELEPHONE ENCOUNTER
Clinic Action Needed: Yes  FNA Triage Call  Presenting Problem:    Patient calling. Very distressed and crying on the phone. Clarified that patient is with mother and safe at present time.    Going to [facility] - currently waiting for room to be admitted.    States she saw doctor on Wednesday and the doctor said she would notify her work of a need for leave of absence.  She works retail at [employer]. This morning she received a message that she was scheduled to work today and did not show up.    Please notify employer of patient's need for leave of absence.    Routed to: Dr. Fox/Nurse Pacheco    Please route to correct pool for Dr. Fox.

## 2019-01-13 NOTE — TELEPHONE ENCOUNTER
Patient calling. Very distressed and crying on the phone. Clarified that patient is with mother and safe at present time.    Going to [facility] - currently waiting for room to be admitted.    States she saw doctor on Wednesday and the doctor said she would notify her work of a need for leave of absence.  She works retail at [employer]. This morning she received a message that she was scheduled to work today and did not show up.    Will send message to Dr. Fox to follow up with patient with regards to employer.    Protocol and care advice reviewed  Caller states understanding of the recommended disposition. Message sent to clinic.    Advised to call back if further questions or concerns      Reason for Disposition    Alcohol or drug abuse, known or suspected    Additional Information    Negative: Coma (e.g., not moving, not talking, not responding to stimuli)    Negative: Difficult to awaken or acting confused  (e.g., disoriented, slurred speech)    Negative: Seeing, hearing, or feeling things that are not there (i.e., visual, auditory, or tactile hallucinations)    Negative: Slow, shallow and weak breathing    Negative: Seizure    Negative: Violent behavior or threatening to kill someone    Negative: Sounds like a life-threatening emergency to the triager    Protocols used: SUBSTANCE ABUSE AND DEPENDENCE-ADULT-

## 2019-01-15 LAB
A-OH ALPRAZ UR QL CFM: NORMAL
ALPRAZ UR QL CFM: POSITIVE
AMPHETAMINES UR CFM-MCNC: NEGATIVE NG/ML
LORAZEPAM UR QL CFM: NEGATIVE
METHAMPHET UR CFM-MCNC: NEGATIVE NG/ML
NORDIAZEPAM UR QL CFM: NEGATIVE
OXAZEPAM UR QL CFM: NEGATIVE
TEMAZEPAM UR QL CFM: NEGATIVE

## 2019-01-16 ENCOUNTER — TELEPHONE (OUTPATIENT)
Dept: ADDICTION MEDICINE | Facility: CLINIC | Age: 39
End: 2019-01-16

## 2019-01-16 ENCOUNTER — OFFICE VISIT (OUTPATIENT)
Dept: ADDICTION MEDICINE | Facility: CLINIC | Age: 39
End: 2019-01-16
Payer: COMMERCIAL

## 2019-01-16 VITALS
BODY MASS INDEX: 24.59 KG/M2 | TEMPERATURE: 98 F | HEIGHT: 66 IN | WEIGHT: 153 LBS | DIASTOLIC BLOOD PRESSURE: 52 MMHG | HEART RATE: 60 BPM | RESPIRATION RATE: 16 BRPM | SYSTOLIC BLOOD PRESSURE: 102 MMHG | OXYGEN SATURATION: 97 %

## 2019-01-16 DIAGNOSIS — F13.10 BENZODIAZEPINE ABUSE (H): Primary | ICD-10-CM

## 2019-01-16 DIAGNOSIS — F11.20 OPIOID USE DISORDER, SEVERE, DEPENDENCE (H): ICD-10-CM

## 2019-01-16 PROCEDURE — 99215 OFFICE O/P EST HI 40 MIN: CPT | Performed by: PEDIATRICS

## 2019-01-16 PROCEDURE — 80306 DRUG TEST PRSMV INSTRMNT: CPT | Performed by: PEDIATRICS

## 2019-01-16 RX ORDER — BUPRENORPHINE AND NALOXONE 8; 2 MG/1; MG/1
1 FILM, SOLUBLE BUCCAL; SUBLINGUAL 2 TIMES DAILY
Qty: 14 FILM | Refills: 0 | Status: SHIPPED | OUTPATIENT
Start: 2019-01-16 | End: 2019-01-24

## 2019-01-16 ASSESSMENT — MIFFLIN-ST. JEOR: SCORE: 1385.75

## 2019-01-16 NOTE — PATIENT INSTRUCTIONS
Call for mental health appointment.     Call for Rule 25   To access chemical dependency treatment you must have an assessment done or updated within 30 days of starting any program. If you are intoxicated to may be required to detox at a detox facility before starting treatment.   [facility]: [phone]  [facility]: [phone]  [facility]: [phone]  [facility]: [phone]   [facility]: [phone]    The following facilities offer Rule 25 assessments -     [facility]  [phone]  [address] [address]  [phone]    [facility]  [phone]  [facility] - Outpatient Mental Health and Addiction   [address] [place] [address]    [facility] Drug Abuse [facility] ([facility])  [phone]  [address] [place] [address]  [facility] [address]  *Walk in assessments also available M-F starting at 8 am.     [facility]   [phone]  [facility]  [address] [facility] [address]    [facility] [phone]  [address] [facility] [address]    [facility]  [phone] - [facility] 1  Multiple clinic locations    [facility]   [phone]  [address]  [facility] [address]    [facility]  [phone]  [address] [facility] [address]    [facility]  [phone]  [address] [facility] [address]    Counts include 234 beds at the [facility] RULE 25 INFORMATION -   [place] - [phone]  [place] - [phone]  [place] - [phone]  [place] - [phone]  [facility] [phone]  [place] - [phone]  [place] - [phone]  [facility] [phone]     Resume Suboxone 8mg 2 x day

## 2019-01-16 NOTE — LETTER
[facility]  [address]  [address]  [facility] [facility] [address]  [phone]        January 16, 2019    Regarding:  Yara Edouard  [address] [address]              To Whom It May Concern;  Please excuse from work due to medical reasons from 12/25/18 through 2/6/19.   If you have any questions please feel free to call my office at number above.          Sincerely,        Maine Fox MD

## 2019-01-16 NOTE — TELEPHONE ENCOUNTER
Pt called about the letter that Dr. Fox wrote for her today, the dates are wrong. The correct dates are 12/25 to 2/6/19. Pt will  letter next week.    Pt contact info: [phone]      Dany Mckeon

## 2019-01-16 NOTE — LETTER
[facility]  [address]  [address]  [facility] [address]  [phone]        January 16, 2019    Regarding:  Yara Edouard  [address] [facility] [address]              To Whom It May Concern;    Please excuse from work due to medical reasons from  1/9/19 lasting for the next three weeks.   If you have any questions please feel free to call my office at number above.            Sincerely,        Maine Fox MD
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
## 2019-01-24 ENCOUNTER — OFFICE VISIT (OUTPATIENT)
Dept: ADDICTION MEDICINE | Facility: CLINIC | Age: 39
End: 2019-01-24
Payer: COMMERCIAL

## 2019-01-24 VITALS
TEMPERATURE: 98.6 F | SYSTOLIC BLOOD PRESSURE: 104 MMHG | HEART RATE: 59 BPM | OXYGEN SATURATION: 100 % | DIASTOLIC BLOOD PRESSURE: 68 MMHG | WEIGHT: 154 LBS | BODY MASS INDEX: 24.86 KG/M2

## 2019-01-24 DIAGNOSIS — F11.20 OPIOID USE DISORDER, SEVERE, DEPENDENCE (H): Primary | ICD-10-CM

## 2019-01-24 DIAGNOSIS — F60.3 BORDERLINE PERSONALITY DISORDER (H): ICD-10-CM

## 2019-01-24 DIAGNOSIS — F43.10 PTSD (POST-TRAUMATIC STRESS DISORDER): ICD-10-CM

## 2019-01-24 DIAGNOSIS — F13.10 BENZODIAZEPINE ABUSE (H): ICD-10-CM

## 2019-01-24 DIAGNOSIS — F31.89 OTHER BIPOLAR DISORDER (H): ICD-10-CM

## 2019-01-24 PROCEDURE — 99000 SPECIMEN HANDLING OFFICE-LAB: CPT | Performed by: PEDIATRICS

## 2019-01-24 PROCEDURE — 80306 DRUG TEST PRSMV INSTRMNT: CPT | Performed by: PEDIATRICS

## 2019-01-24 PROCEDURE — 80346 BENZODIAZEPINES1-12: CPT | Mod: 90 | Performed by: PEDIATRICS

## 2019-01-24 PROCEDURE — 99215 OFFICE O/P EST HI 40 MIN: CPT | Performed by: PEDIATRICS

## 2019-01-24 RX ORDER — BUPRENORPHINE AND NALOXONE 8; 2 MG/1; MG/1
1 FILM, SOLUBLE BUCCAL; SUBLINGUAL 2 TIMES DAILY
Qty: 30 FILM | Refills: 0 | Status: SHIPPED | OUTPATIENT
Start: 2019-01-24 | End: 2019-02-04
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
## 2019-01-28 DIAGNOSIS — F19.20 CHEMICAL DEPENDENCY (H): ICD-10-CM

## 2019-01-28 DIAGNOSIS — F31.89 OTHER BIPOLAR DISORDER (H): ICD-10-CM

## 2019-01-28 LAB
A-OH ALPRAZ UR QL CFM: NEGATIVE
ALPRAZ UR QL CFM: NEGATIVE
LORAZEPAM UR QL CFM: NEGATIVE
NORDIAZEPAM UR QL CFM: NEGATIVE
OXAZEPAM UR QL CFM: NEGATIVE
TEMAZEPAM UR QL CFM: NEGATIVE

## 2019-01-29 DIAGNOSIS — F43.10 PTSD (POST-TRAUMATIC STRESS DISORDER): ICD-10-CM

## 2019-01-29 DIAGNOSIS — F11.20 OPIOID USE DISORDER, SEVERE, DEPENDENCE (H): ICD-10-CM

## 2019-01-29 DIAGNOSIS — F31.89 OTHER BIPOLAR DISORDER (H): ICD-10-CM

## 2019-01-29 RX ORDER — GABAPENTIN 300 MG/1
300 CAPSULE ORAL 3 TIMES DAILY
Qty: 90 CAPSULE | Refills: 0 | Status: CANCELLED | OUTPATIENT
Start: 2019-01-29

## 2019-01-29 NOTE — TELEPHONE ENCOUNTER
gabapentin (NEURONTIN) 300 MG capsule  Requested Prescriptions  Last Written Prescription Date:  1/8/19  Last Fill Quantity: 90,  # refills: 0   Last office visit: 1/24/2019 with prescribing provider:     Future Office Visit:   Next 5 appointments (look out 90 days)    Feb 04, 2019  3:20 PM CST  Return Visit with Maine Fox MD  [facility] ([facility]) [address]  [address]  [facility] [address] [phone]            Pending Prescriptions Disp Refills     gabapentin (NEURONTIN) 300 MG capsule 90 capsule 0     Sig: Take 1 capsule (300 mg) by mouth 3 times daily    There is no refill protocol information for this order

## 2019-01-30 RX ORDER — CLONIDINE HYDROCHLORIDE 0.2 MG/1
0.2 TABLET ORAL 3 TIMES DAILY PRN
Qty: 90 TABLET | Refills: 0 | Status: SHIPPED | OUTPATIENT
Start: 2019-01-30 | End: 2019-02-22

## 2019-01-30 RX ORDER — LAMOTRIGINE 150 MG/1
150 TABLET ORAL 2 TIMES DAILY
Qty: 60 TABLET | Refills: 0 | Status: SHIPPED | OUTPATIENT
Start: 2019-01-30 | End: 2019-02-22

## 2019-01-31 ENCOUNTER — TELEPHONE (OUTPATIENT)
Dept: PSYCHIATRY | Facility: CLINIC | Age: 39
End: 2019-01-31

## 2019-01-31 NOTE — TELEPHONE ENCOUNTER
Meds refilled by provider on 1/30/19. Writer unable to locate the script.     Placed a call to pharmacy at [phone] to confirm if a script was faxed. Per pharmacist the script was not received. Writer called in cloNIDine (CATAPRES) 0.2 MG tablet- Take 1 tablet (0.2 mg) by mouth 3 times daily as needed (anxiety)  #90 with 0 refills to pharmacist Sharmila.     Placed a call to patient at [phone]. No answer at number provided. LVM, notifying the refill was completed. Clinic number provided

## 2019-01-31 NOTE — TELEPHONE ENCOUNTER
Medication requested: clonidine 0.2mg  Last refilled: 1/2/19  Qty: 90    Medication requested: lamotrigine 150mg tab  Last refilled: 11/19/18  Qty: 60        Last seen: 8/23/18  RTC: 6 weeks  Cancel: 0  No-show: 0  Next appt: not scheduled    Refill decision:   30 day odilia refill sent to the pharmacy - including instructions for patient to call the clinic and schedule an appointment.  Scheduling has been notified to contact the pt for appointment.  Pt outside of RTC timeframe.

## 2019-02-04 ENCOUNTER — TELEPHONE (OUTPATIENT)
Dept: ADDICTION MEDICINE | Facility: CLINIC | Age: 39
End: 2019-02-04

## 2019-02-04 DIAGNOSIS — F43.10 PTSD (POST-TRAUMATIC STRESS DISORDER): ICD-10-CM

## 2019-02-04 DIAGNOSIS — F31.89 OTHER BIPOLAR DISORDER (H): ICD-10-CM

## 2019-02-04 DIAGNOSIS — F11.20 OPIOID USE DISORDER, SEVERE, DEPENDENCE (H): ICD-10-CM

## 2019-02-04 RX ORDER — GABAPENTIN 300 MG/1
300 CAPSULE ORAL 3 TIMES DAILY
Qty: 51 CAPSULE | Refills: 0 | Status: SHIPPED | OUTPATIENT
Start: 2019-02-04 | End: 2019-02-22

## 2019-02-04 RX ORDER — BUPRENORPHINE AND NALOXONE 8; 2 MG/1; MG/1
1 FILM, SOLUBLE BUCCAL; SUBLINGUAL 2 TIMES DAILY
Qty: 34 FILM | Refills: 0 | Status: SHIPPED | OUTPATIENT
Start: 2019-02-04 | End: 2019-02-22

## 2019-02-04 NOTE — TELEPHONE ENCOUNTER
Refill for: suboxone and gabapentin    Last Appointment: 1/24/19    Next Appointment: 2/22/19     No Shows/Cancellations since last appointment:  Late cancel 2/4/19    Last Refill in Epic (date and amount/how many days):    Disp Refills Start End CECE   buprenorphine HCl-naloxone HCl (SUBOXONE) 8-2 MG per film 30 Film 0 1/24/2019  No   Sig - Route: Place 1 Film under the tongue 2 times daily - Sublingual      Disp Refills Start End CECE   gabapentin (NEURONTIN) 300 MG capsule 90 capsule 0 1/8/2019  No   Sig - Route: Take 1 capsule (300 mg) by mouth 3 times daily - Oral       Most Recent UDS results: 1/24/19 POS for Benzodiazepines and Buprenorphine     reviewed and summarized below:   Fill Date Written    Drug   Qty Days Prescriber  01/24/2019 01/24/2019 Suboxone 8 Mg-2 MG SL Film 30 15 Ei Bur  01/16/2019 01/16/2019 Suboxone 8 Mg-2 MG SL Film 14 7 Ei Bur   01/09/2019 01/08/2019 Gabapentin 300 MG Capsule  90 30 Ei Bur     Pended Rx with adjusted QTY based on next appointment.     Routing to provider.    Sheri Schuler, RN on 2/4/2019 at 1:54 PM

## 2019-02-04 NOTE — TELEPHONE ENCOUNTER
Pt called needing to reschedule her appt that was scheduled for 2/4/19. Pt is now scheduled for 2/22/19. Pt stated that she will run out of her subx with none to take starting on 2/6/19. Pt is requesting a bridge of subx to get her to her scheduled appt.     Pt is also requesting a refill of her gabapentin or at least a bridge to get her to her scheduled appt as she will be out with none to take starting 2/6/19.     gabapentin (NEURONTIN) 300 MG capsule  Requested Prescriptions  Last Written Prescription Date:  1/8/19  Last Fill Quantity: 90,  # refills: 0   Last office visit: 1/24/2019 with prescribing provider:     Future Office Visit:   Next 5 appointments (look out 90 days)    Feb 22, 2019 11:00 AM CST  Return Visit with Maine Fox MD  [facility] ([facility]) [address] [place] [address]  [address]  [facility] [address] [phone]            Pending Prescriptions Disp Refills     buprenorphine HCl-naloxone HCl (SUBOXONE) 8-2 MG per film 30 Film 0     Sig: Place 1 Film under the tongue 2 times daily    There is no refill protocol information for this order        gabapentin (NEURONTIN) 300 MG capsule 90 capsule 0     Sig: Take 1 capsule (300 mg) by mouth 3 times daily    There is no refill protocol information for this order        Pharmacy: [facility] tel: [phone]    Pt tel: [phone] (okay to leave a detailed message)    Danielle Valdez  [facility]

## 2019-02-05 NOTE — TELEPHONE ENCOUNTER
Writer called in Suboxone to pharmacy. Confirmed gabapentin was received yesterday via e-prescribe.     Writer called patient to inform her the prescriptions were called in.    Sheri Schuler RN on 2/5/2019 at 9:14 AM

## 2019-02-08 DIAGNOSIS — F31.89 OTHER BIPOLAR DISORDER (H): ICD-10-CM

## 2019-02-11 RX ORDER — BUPROPION HYDROCHLORIDE 300 MG/1
TABLET ORAL
Qty: 30 TABLET | Refills: 0 | Status: SHIPPED | OUTPATIENT
Start: 2019-02-11 | End: 2019-03-11

## 2019-02-11 NOTE — TELEPHONE ENCOUNTER
Medication requested: buPROPion (WELLBUTRIN XL) 300 MG 24 hr tablet  Last refilled: 1/12/19  Qty: 30      Last seen: 8/23/18  RTC: 6 weeks  Cancel: 2  No-show: 0  Next appt: 2/14/19    Refill decision:   Refill pended and routed to the provider for review/determination due to cancel x 2  Pt outside of RTC timeframe.

## 2019-02-22 ENCOUNTER — TELEPHONE (OUTPATIENT)
Dept: ADDICTION MEDICINE | Facility: CLINIC | Age: 39
End: 2019-02-22

## 2019-02-22 ENCOUNTER — OFFICE VISIT (OUTPATIENT)
Dept: ADDICTION MEDICINE | Facility: CLINIC | Age: 39
End: 2019-02-22
Payer: COMMERCIAL

## 2019-02-22 VITALS
HEART RATE: 88 BPM | DIASTOLIC BLOOD PRESSURE: 64 MMHG | OXYGEN SATURATION: 99 % | WEIGHT: 164 LBS | SYSTOLIC BLOOD PRESSURE: 120 MMHG | RESPIRATION RATE: 14 BRPM | BODY MASS INDEX: 26.47 KG/M2

## 2019-02-22 DIAGNOSIS — F60.3 BORDERLINE PERSONALITY DISORDER (H): ICD-10-CM

## 2019-02-22 DIAGNOSIS — F33.41 RECURRENT MAJOR DEPRESSIVE DISORDER, IN PARTIAL REMISSION (H): ICD-10-CM

## 2019-02-22 DIAGNOSIS — F43.10 PTSD (POST-TRAUMATIC STRESS DISORDER): ICD-10-CM

## 2019-02-22 DIAGNOSIS — N30.10 INTERSTITIAL CYSTITIS: ICD-10-CM

## 2019-02-22 DIAGNOSIS — F13.10 BENZODIAZEPINE ABUSE (H): ICD-10-CM

## 2019-02-22 DIAGNOSIS — F19.20 CHEMICAL DEPENDENCY (H): ICD-10-CM

## 2019-02-22 DIAGNOSIS — F11.20 OPIOID USE DISORDER, SEVERE, DEPENDENCE (H): Primary | ICD-10-CM

## 2019-02-22 DIAGNOSIS — F31.89 OTHER BIPOLAR DISORDER (H): ICD-10-CM

## 2019-02-22 LAB
AMPHETAMINES UR QL: NOT DETECTED NG/ML
BARBITURATES UR QL SCN: NOT DETECTED NG/ML
BENZODIAZ UR QL SCN: ABNORMAL NG/ML
BUPRENORPHINE UR QL: ABNORMAL NG/ML
CANNABINOIDS UR QL: NOT DETECTED NG/ML
COCAINE UR QL SCN: NOT DETECTED NG/ML
D-METHAMPHET UR QL: NOT DETECTED NG/ML
METHADONE UR QL SCN: NOT DETECTED NG/ML
OPIATES UR QL SCN: NOT DETECTED NG/ML
OXYCODONE UR QL SCN: NOT DETECTED NG/ML
PCP UR QL SCN: ABNORMAL NG/ML
PROPOXYPH UR QL: NOT DETECTED NG/ML
TRICYCLICS UR QL SCN: ABNORMAL NG/ML

## 2019-02-22 PROCEDURE — 80346 BENZODIAZEPINES1-12: CPT | Mod: 90 | Performed by: PEDIATRICS

## 2019-02-22 PROCEDURE — 99000 SPECIMEN HANDLING OFFICE-LAB: CPT | Performed by: PEDIATRICS

## 2019-02-22 PROCEDURE — 80306 DRUG TEST PRSMV INSTRMNT: CPT | Performed by: PEDIATRICS

## 2019-02-22 PROCEDURE — 99215 OFFICE O/P EST HI 40 MIN: CPT | Performed by: PEDIATRICS

## 2019-02-22 RX ORDER — LAMOTRIGINE 150 MG/1
TABLET ORAL
Qty: 30 TABLET | Refills: 3 | Status: SHIPPED | OUTPATIENT
Start: 2019-02-22 | End: 2019-11-07

## 2019-02-22 RX ORDER — CLONIDINE HYDROCHLORIDE 0.1 MG/1
.1-.2 TABLET ORAL 3 TIMES DAILY PRN
Qty: 180 TABLET | Refills: 1 | Status: SHIPPED | OUTPATIENT
Start: 2019-02-22 | End: 2020-04-03

## 2019-02-22 RX ORDER — FLUOXETINE 40 MG/1
40 CAPSULE ORAL DAILY
Qty: 30 CAPSULE | Refills: 3 | Status: SHIPPED | OUTPATIENT
Start: 2019-02-22 | End: 2019-06-24

## 2019-02-22 RX ORDER — CLONIDINE HYDROCHLORIDE 0.2 MG/1
0.2 TABLET ORAL 3 TIMES DAILY PRN
Qty: 90 TABLET | Refills: 3 | Status: SHIPPED | OUTPATIENT
Start: 2019-02-22 | End: 2020-07-31

## 2019-02-22 RX ORDER — BUPRENORPHINE AND NALOXONE 8; 2 MG/1; MG/1
1 FILM, SOLUBLE BUCCAL; SUBLINGUAL 2 TIMES DAILY
Qty: 60 FILM | Refills: 0 | Status: SHIPPED | OUTPATIENT
Start: 2019-02-22 | End: 2019-04-15

## 2019-02-22 RX ORDER — GABAPENTIN 300 MG/1
300 CAPSULE ORAL 3 TIMES DAILY
Qty: 90 CAPSULE | Refills: 3 | Status: SHIPPED | OUTPATIENT
Start: 2019-02-22 | End: 2019-05-24

## 2019-02-22 RX ORDER — LAMOTRIGINE 200 MG/1
100 TABLET ORAL DAILY
Qty: 30 TABLET | Refills: 3 | Status: SHIPPED | OUTPATIENT
Start: 2019-02-22 | End: 2019-11-07

## 2019-02-22 NOTE — TELEPHONE ENCOUNTER
Reason for Call:  Medication or medication refill:    Do you use a [facility]?  Name of the pharmacy and phone number for the current request:  [facility]: [phone]    Name of the medication requested: cloNIDine (CATAPRES) 0.2 MG tablet    Other request: Pt is requesting to decrease/change her cloNIDine (CATAPRES) 0.2 MG tablet to 0.1 with the directions that she can take 1 or 2 as needed.    Can we leave a detailed message on this number? YES    Phone number patient can be reached at: Home number on file [phone] (home)    Best Time: anytime    Call taken on 2/22/2019 at 4:46 PM by Danielle Valdez
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
## 2019-03-07 DIAGNOSIS — F43.10 PTSD (POST-TRAUMATIC STRESS DISORDER): ICD-10-CM

## 2019-03-09 NOTE — TELEPHONE ENCOUNTER
Medication requested: propranolol (INDERAL) 40 MG tablet  Last refilled: 1/30/19  Qty: 100      Last seen: 8/23/18  RTC: 6 weeks  Cancel: 2  No-show: 1  Next appt: not scheduled    Refill decision:   Refill pended and routed to the provider for review/determination due to   NO SHOW X 1  CANCEL X 2  Pt outside of RTC timeframe.  Scheduling has been notified to contact the pt for appointment.  .

## 2019-03-11 DIAGNOSIS — F31.89 OTHER BIPOLAR DISORDER (H): ICD-10-CM

## 2019-03-12 NOTE — TELEPHONE ENCOUNTER
Medication requested: buPROPion (WELLBUTRIN XL) 300 MG 24 hr tablet  Last refilled: 2/11/19  Qty: 30       Last seen: 8/23/18  RTC: 6 weeks  Cancel: 2  No-show: 1  Next appt: not scheduled     Refill decision:   Refill pended and routed to the provider for review/determination due to   NO SHOW X 1  CANCEL X 2  Pt outside of RTC timeframe.  Scheduling has been notified to contact the pt for appointment.  .

## 2019-03-19 RX ORDER — PROPRANOLOL HYDROCHLORIDE 40 MG/1
40 TABLET ORAL 4 TIMES DAILY PRN
Qty: 100 TABLET | Refills: 0 | Status: SHIPPED | OUTPATIENT
Start: 2019-03-19 | End: 2020-07-31

## 2019-03-19 RX ORDER — BUPROPION HYDROCHLORIDE 300 MG/1
300 TABLET ORAL EVERY MORNING
Qty: 30 TABLET | Refills: 0 | Status: SHIPPED | OUTPATIENT
Start: 2019-03-19 | End: 2019-04-15

## 2019-04-08 DIAGNOSIS — F11.20 OPIOID USE DISORDER, SEVERE, DEPENDENCE (H): ICD-10-CM

## 2019-04-08 RX ORDER — CLONIDINE HYDROCHLORIDE 0.1 MG/1
.1-.2 TABLET ORAL 3 TIMES DAILY PRN
Qty: 180 TABLET | Refills: 1 | OUTPATIENT
Start: 2019-04-08

## 2019-04-08 NOTE — TELEPHONE ENCOUNTER
"Requested Prescriptions   Pending Prescriptions Disp Refills     cloNIDine (CATAPRES) 0.1 MG tablet  Last Written Prescription Date:  22/22/19  Last Fill Quantity: 180,  # refills: 1   Last office visit: 2/22/2019 with prescribing provider:     Future Office Visit:   Next 5 appointments (look out 90 days)    Apr 11, 2019  2:20 PM CDT  Return Visit with Maine Fox MD  [facility] ([facility]) [address] [place] [address]  [address]  [facility] [address] [phone]          180 tablet 1     Sig: Take 1-2 tablets (0.1-0.2 mg) by mouth 3 times daily as needed       Central Acting Antiadrenergic Agents Failed - 4/8/2019 10:44 AM        Failed - Normal serum creatinine on file within past 12 months     Recent Labs   Lab Test 01/05/18  0744   CR 0.72             Passed - Blood pressure under 140/90 in past 12 months     BP Readings from Last 3 Encounters:   02/22/19 120/64   01/24/19 104/68   01/16/19 102/52                 Passed - Patient is 6 years of age or older        Passed - Recent (12 mo) or future (30 days) visit within the authorizing provider's specialty     Patient had office visit in the last 12 months or has a visit in the next 30 days with authorizing provider or within the authorizing provider's specialty.  See \"Patient Info\" tab in inbasket, or \"Choose Columns\" in Meds & Orders section of the refill encounter.              Passed - Medication is active on med list        Passed - Patient not pregnant        Passed - No positive pregnancy test on file in past 12 months          "

## 2019-04-08 NOTE — TELEPHONE ENCOUNTER
Refill for: cloNIDine (CATAPRES) 0.1 MG tablet    Last Appointment: 2/22/19    Next Appointment: 4/11/19    No Shows/Cancellations since last appointment:  Late Cancel 3/21/19    Last Refill in Epic (date and amount/how many days):    Disp Refills Start End CECE   cloNIDine (CATAPRES) 0.2 MG tablet 90 tablet 3 2/22/2019  No   Sig - Route: Take 1 tablet (0.2 mg) by mouth 3 times daily as needed (anxiety) - Oral     Refill request refused as patient received RX for    Disp Refills Start End CECE   cloNIDine (CATAPRES) 0.2 MG tablet 90 tablet 3 2/22/2019  No   Sig - Route: Take 1 tablet (0.2 mg) by mouth 3 times daily as needed (anxiety) - Oral     So refill of cloNIDine (CATAPRES) 0.1 MG tablet is not appropriate at this time.

## 2019-04-15 ENCOUNTER — OFFICE VISIT (OUTPATIENT)
Dept: ADDICTION MEDICINE | Facility: CLINIC | Age: 39
End: 2019-04-15
Payer: COMMERCIAL

## 2019-04-15 VITALS
SYSTOLIC BLOOD PRESSURE: 110 MMHG | DIASTOLIC BLOOD PRESSURE: 60 MMHG | RESPIRATION RATE: 16 BRPM | TEMPERATURE: 98.8 F | BODY MASS INDEX: 27.4 KG/M2 | OXYGEN SATURATION: 95 % | WEIGHT: 170.5 LBS | HEART RATE: 85 BPM | HEIGHT: 66 IN

## 2019-04-15 DIAGNOSIS — F33.41 RECURRENT MAJOR DEPRESSIVE DISORDER, IN PARTIAL REMISSION (H): ICD-10-CM

## 2019-04-15 DIAGNOSIS — N30.10 INTERSTITIAL CYSTITIS: ICD-10-CM

## 2019-04-15 DIAGNOSIS — F11.20 OPIOID USE DISORDER, SEVERE, DEPENDENCE (H): Primary | ICD-10-CM

## 2019-04-15 DIAGNOSIS — F43.10 PTSD (POST-TRAUMATIC STRESS DISORDER): ICD-10-CM

## 2019-04-15 DIAGNOSIS — F60.3 BORDERLINE PERSONALITY DISORDER (H): ICD-10-CM

## 2019-04-15 DIAGNOSIS — F31.89 OTHER BIPOLAR DISORDER (H): ICD-10-CM

## 2019-04-15 DIAGNOSIS — F13.10 BENZODIAZEPINE ABUSE (H): ICD-10-CM

## 2019-04-15 LAB
AMPHETAMINES UR QL: NOT DETECTED NG/ML
BARBITURATES UR QL SCN: NOT DETECTED NG/ML
BENZODIAZ UR QL SCN: ABNORMAL NG/ML
BUPRENORPHINE UR QL: ABNORMAL NG/ML
CANNABINOIDS UR QL: NOT DETECTED NG/ML
COCAINE UR QL SCN: NOT DETECTED NG/ML
D-METHAMPHET UR QL: NOT DETECTED NG/ML
METHADONE UR QL SCN: NOT DETECTED NG/ML
OPIATES UR QL SCN: NOT DETECTED NG/ML
OXYCODONE UR QL SCN: NOT DETECTED NG/ML
PCP UR QL SCN: ABNORMAL NG/ML
PROPOXYPH UR QL: ABNORMAL NG/ML
TRICYCLICS UR QL SCN: ABNORMAL NG/ML

## 2019-04-15 PROCEDURE — 80306 DRUG TEST PRSMV INSTRMNT: CPT | Performed by: PEDIATRICS

## 2019-04-15 PROCEDURE — 99215 OFFICE O/P EST HI 40 MIN: CPT | Performed by: PEDIATRICS

## 2019-04-15 RX ORDER — BUPRENORPHINE AND NALOXONE 8; 2 MG/1; MG/1
1 FILM, SOLUBLE BUCCAL; SUBLINGUAL 2 TIMES DAILY
Qty: 60 FILM | Refills: 0 | Status: SHIPPED | OUTPATIENT
Start: 2019-04-15 | End: 2019-05-24

## 2019-04-15 ASSESSMENT — MIFFLIN-ST. JEOR: SCORE: 1465.13
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
## 2019-05-03 DIAGNOSIS — F19.20 CHEMICAL DEPENDENCY (H): ICD-10-CM

## 2019-05-03 NOTE — TELEPHONE ENCOUNTER
"Requested Prescriptions   Pending Prescriptions Disp Refills     omeprazole (PRILOSEC) 20 MG DR capsule Last Written Prescription Date:  4/19/18  Last Fill Quantity: 90,  # refills: 3   Last office visit: 4/15/2019 with prescribing provider:     Future Office Visit:   Next 5 appointments (look out 90 days)    May 03, 2019  3:40 PM CDT  Return Visit with Maine Fox MD  [facility] [address] [place] [address]  [address]  [facility] [address] [phone]          90 capsule 3     Sig: Take 1 capsule (20 mg) by mouth every morning       PPI Protocol Passed - 5/3/2019  8:17 AM        Passed - Not on Clopidogrel (unless Pantoprazole ordered)        Passed - No diagnosis of osteoporosis on record        Passed - Recent (12 mo) or future (30 days) visit within the authorizing provider's specialty     Patient had office visit in the last 12 months or has a visit in the next 30 days with authorizing provider or within the authorizing provider's specialty.  See \"Patient Info\" tab in inbasket, or \"Choose Columns\" in Meds & Orders section of the refill encounter.              Passed - Medication is active on med list        Passed - Patient is age 18 or older        Passed - No active pregnacy on record        Passed - No positive pregnancy test in past 12 months          "

## 2019-05-03 NOTE — TELEPHONE ENCOUNTER
Omeprazole script denied as she does not see a family practice provider at this clinic.  Unable to route to PCP noted in Epic. Juanita Valdez RN May 3, 2019 10:41 AM

## 2019-06-12 ENCOUNTER — OFFICE VISIT (OUTPATIENT)
Dept: ADDICTION MEDICINE | Facility: CLINIC | Age: 39
End: 2019-06-12
Payer: COMMERCIAL

## 2019-06-12 VITALS
OXYGEN SATURATION: 99 % | RESPIRATION RATE: 14 BRPM | WEIGHT: 182 LBS | SYSTOLIC BLOOD PRESSURE: 122 MMHG | HEIGHT: 66 IN | DIASTOLIC BLOOD PRESSURE: 68 MMHG | HEART RATE: 85 BPM | BODY MASS INDEX: 29.25 KG/M2

## 2019-06-12 DIAGNOSIS — F33.41 RECURRENT MAJOR DEPRESSIVE DISORDER, IN PARTIAL REMISSION (H): ICD-10-CM

## 2019-06-12 DIAGNOSIS — F31.89 OTHER BIPOLAR DISORDER (H): ICD-10-CM

## 2019-06-12 DIAGNOSIS — F43.10 PTSD (POST-TRAUMATIC STRESS DISORDER): ICD-10-CM

## 2019-06-12 DIAGNOSIS — F13.10 BENZODIAZEPINE ABUSE (H): ICD-10-CM

## 2019-06-12 DIAGNOSIS — N30.10 INTERSTITIAL CYSTITIS: ICD-10-CM

## 2019-06-12 DIAGNOSIS — F11.20 OPIOID USE DISORDER, SEVERE, DEPENDENCE (H): Primary | ICD-10-CM

## 2019-06-12 DIAGNOSIS — F60.3 BORDERLINE PERSONALITY DISORDER (H): ICD-10-CM

## 2019-06-12 LAB
AMPHETAMINES UR QL SCN: NEGATIVE
AMPHETAMINES UR QL: NOT DETECTED NG/ML
BARBITURATES UR QL SCN: NOT DETECTED NG/ML
BENZODIAZ UR QL SCN: ABNORMAL NG/ML
BUPRENORPHINE UR QL: ABNORMAL NG/ML
CANNABINOIDS UR QL: NOT DETECTED NG/ML
COCAINE UR QL SCN: NOT DETECTED NG/ML
D-METHAMPHET UR QL: ABNORMAL NG/ML
METHADONE UR QL SCN: NOT DETECTED NG/ML
OPIATES UR QL SCN: NOT DETECTED NG/ML
OXYCODONE UR QL SCN: NOT DETECTED NG/ML
PCP UR QL SCN: NOT DETECTED NG/ML
PROPOXYPH UR QL: NOT DETECTED NG/ML
TRICYCLICS UR QL SCN: NOT DETECTED NG/ML

## 2019-06-12 PROCEDURE — 80307 DRUG TEST PRSMV CHEM ANLYZR: CPT | Performed by: PEDIATRICS

## 2019-06-12 PROCEDURE — 99215 OFFICE O/P EST HI 40 MIN: CPT | Performed by: PEDIATRICS

## 2019-06-12 RX ORDER — BUPRENORPHINE AND NALOXONE 8; 2 MG/1; MG/1
1 FILM, SOLUBLE BUCCAL; SUBLINGUAL 2 TIMES DAILY
Qty: 14 FILM | Refills: 0 | Status: SHIPPED | OUTPATIENT
Start: 2019-06-12 | End: 2019-06-26

## 2019-06-12 ASSESSMENT — MIFFLIN-ST. JEOR: SCORE: 1517.3
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
## 2019-06-21 ENCOUNTER — OFFICE VISIT (OUTPATIENT)
Dept: ADDICTION MEDICINE | Facility: CLINIC | Age: 39
End: 2019-06-21
Payer: COMMERCIAL

## 2019-06-21 VITALS
HEIGHT: 66 IN | WEIGHT: 189 LBS | RESPIRATION RATE: 14 BRPM | HEART RATE: 97 BPM | BODY MASS INDEX: 30.37 KG/M2 | SYSTOLIC BLOOD PRESSURE: 114 MMHG | DIASTOLIC BLOOD PRESSURE: 62 MMHG | OXYGEN SATURATION: 98 %

## 2019-06-21 DIAGNOSIS — F31.89 OTHER BIPOLAR DISORDER (H): ICD-10-CM

## 2019-06-21 DIAGNOSIS — F11.20 OPIOID USE DISORDER, SEVERE, DEPENDENCE (H): Primary | ICD-10-CM

## 2019-06-21 DIAGNOSIS — F60.3 BORDERLINE PERSONALITY DISORDER (H): ICD-10-CM

## 2019-06-21 DIAGNOSIS — F43.10 PTSD (POST-TRAUMATIC STRESS DISORDER): ICD-10-CM

## 2019-06-21 DIAGNOSIS — F33.41 RECURRENT MAJOR DEPRESSIVE DISORDER, IN PARTIAL REMISSION (H): ICD-10-CM

## 2019-06-21 DIAGNOSIS — F13.10 BENZODIAZEPINE ABUSE (H): ICD-10-CM

## 2019-06-21 PROCEDURE — 99215 OFFICE O/P EST HI 40 MIN: CPT | Performed by: PEDIATRICS

## 2019-06-21 PROCEDURE — 80306 DRUG TEST PRSMV INSTRMNT: CPT | Performed by: PEDIATRICS

## 2019-06-21 ASSESSMENT — MIFFLIN-ST. JEOR: SCORE: 1549.05
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
## 2019-06-24 DIAGNOSIS — F33.41 RECURRENT MAJOR DEPRESSIVE DISORDER, IN PARTIAL REMISSION (H): ICD-10-CM

## 2019-06-24 RX ORDER — FLUOXETINE 40 MG/1
40 CAPSULE ORAL DAILY
Qty: 30 CAPSULE | Refills: 3 | Status: SHIPPED | OUTPATIENT
Start: 2019-06-24

## 2019-06-24 NOTE — TELEPHONE ENCOUNTER
"Requested Prescriptions   Pending Prescriptions Disp Refills     FLUoxetine (PROZAC) 40 MG capsule  Last Written Prescription Date:  2/22/19  Last Fill Quantity: 30,  # refills: 3   Last office visit: 6/21/2019 with prescribing provider:     Future Office Visit:   Next 5 appointments (look out 90 days)    Jun 26, 2019 10:40 AM CDT  Return Visit with Maine Fox MD  [facility] ([facility]) [address] [place] [address]  [address]  [facility] [id number] [phone]          30 capsule 3     Sig: Take 1 capsule (40 mg) by mouth daily       SSRIs Protocol Failed - 6/24/2019 10:16 AM        Failed - PHQ-9 score less than 5 in past 6 months     Please review last PHQ-9 score.           Passed - Medication is active on med list        Passed - Patient is age 18 or older        Passed - No active pregnancy on record        Passed - No positive pregnancy test in last 12 months        Passed - Recent (6 mo) or future (30 days) visit within the authorizing provider's specialty     Patient had office visit in the last 6 months or has a visit in the next 30 days with authorizing provider or within the authorizing provider's specialty.  See \"Patient Info\" tab in inbasket, or \"Choose Columns\" in Meds & Orders section of the refill encounter.              "

## 2019-06-26 ENCOUNTER — TELEPHONE (OUTPATIENT)
Dept: PSYCHIATRY | Facility: CLINIC | Age: 39
End: 2019-06-26

## 2019-06-26 ENCOUNTER — OFFICE VISIT (OUTPATIENT)
Dept: ADDICTION MEDICINE | Facility: CLINIC | Age: 39
End: 2019-06-26
Payer: COMMERCIAL

## 2019-06-26 VITALS
HEART RATE: 98 BPM | HEIGHT: 66 IN | SYSTOLIC BLOOD PRESSURE: 112 MMHG | OXYGEN SATURATION: 98 % | RESPIRATION RATE: 16 BRPM | BODY MASS INDEX: 29.89 KG/M2 | WEIGHT: 186 LBS | DIASTOLIC BLOOD PRESSURE: 64 MMHG

## 2019-06-26 DIAGNOSIS — F13.10 BENZODIAZEPINE ABUSE (H): ICD-10-CM

## 2019-06-26 DIAGNOSIS — F31.89 OTHER BIPOLAR DISORDER (H): ICD-10-CM

## 2019-06-26 DIAGNOSIS — F43.10 PTSD (POST-TRAUMATIC STRESS DISORDER): ICD-10-CM

## 2019-06-26 DIAGNOSIS — F60.3 BORDERLINE PERSONALITY DISORDER (H): ICD-10-CM

## 2019-06-26 DIAGNOSIS — F11.20 OPIOID USE DISORDER, SEVERE, DEPENDENCE (H): Primary | ICD-10-CM

## 2019-06-26 PROCEDURE — 99215 OFFICE O/P EST HI 40 MIN: CPT | Performed by: PEDIATRICS

## 2019-06-26 PROCEDURE — 80306 DRUG TEST PRSMV INSTRMNT: CPT | Performed by: PEDIATRICS

## 2019-06-26 RX ORDER — BUPRENORPHINE AND NALOXONE 8; 2 MG/1; MG/1
1 FILM, SOLUBLE BUCCAL; SUBLINGUAL 2 TIMES DAILY
Qty: 14 FILM | Refills: 0 | Status: SHIPPED | OUTPATIENT
Start: 2019-06-26 | End: 2019-07-11

## 2019-06-26 ASSESSMENT — MIFFLIN-ST. JEOR: SCORE: 1535.44
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
## 2019-06-26 NOTE — TELEPHONE ENCOUNTER
"Writer received incoming call from patient [phone] requesting a copy of Gene[employer] results completed in 2017. Patient reports the testing was completed in a treatment center but a copy was provided to a provider. Post reviewing the chart GeneSight result copy was not found. This writer was directed to provide a \"[employer] patient report request\" to the patient. Writer will provide to patient when arrive in clinic.   "

## 2019-07-11 ENCOUNTER — OFFICE VISIT (OUTPATIENT)
Dept: ADDICTION MEDICINE | Facility: CLINIC | Age: 39
End: 2019-07-11
Payer: COMMERCIAL

## 2019-07-11 VITALS
RESPIRATION RATE: 14 BRPM | OXYGEN SATURATION: 97 % | DIASTOLIC BLOOD PRESSURE: 78 MMHG | BODY MASS INDEX: 30.67 KG/M2 | SYSTOLIC BLOOD PRESSURE: 122 MMHG | HEART RATE: 83 BPM | TEMPERATURE: 97.7 F | WEIGHT: 190 LBS

## 2019-07-11 DIAGNOSIS — F60.3 BORDERLINE PERSONALITY DISORDER (H): ICD-10-CM

## 2019-07-11 DIAGNOSIS — R05.9 COUGH: ICD-10-CM

## 2019-07-11 DIAGNOSIS — F31.89 OTHER BIPOLAR DISORDER (H): ICD-10-CM

## 2019-07-11 DIAGNOSIS — F43.10 PTSD (POST-TRAUMATIC STRESS DISORDER): ICD-10-CM

## 2019-07-11 DIAGNOSIS — F33.41 RECURRENT MAJOR DEPRESSIVE DISORDER, IN PARTIAL REMISSION (H): ICD-10-CM

## 2019-07-11 DIAGNOSIS — F13.10 BENZODIAZEPINE ABUSE (H): ICD-10-CM

## 2019-07-11 DIAGNOSIS — F11.20 OPIOID USE DISORDER, SEVERE, DEPENDENCE (H): Primary | ICD-10-CM

## 2019-07-11 LAB
AMPHETAMINES UR QL: NOT DETECTED NG/ML
BARBITURATES UR QL SCN: NOT DETECTED NG/ML
BENZODIAZ UR QL SCN: ABNORMAL NG/ML
BUPRENORPHINE UR QL: ABNORMAL NG/ML
CANNABINOIDS UR QL: NOT DETECTED NG/ML
COCAINE UR QL SCN: NOT DETECTED NG/ML
D-METHAMPHET UR QL: ABNORMAL NG/ML
METHADONE UR QL SCN: NOT DETECTED NG/ML
OPIATES UR QL SCN: NOT DETECTED NG/ML
OXYCODONE UR QL SCN: NOT DETECTED NG/ML
PCP UR QL SCN: NOT DETECTED NG/ML
PROPOXYPH UR QL: NOT DETECTED NG/ML
TRICYCLICS UR QL SCN: NOT DETECTED NG/ML

## 2019-07-11 PROCEDURE — 99215 OFFICE O/P EST HI 40 MIN: CPT | Performed by: PEDIATRICS

## 2019-07-11 PROCEDURE — 80306 DRUG TEST PRSMV INSTRMNT: CPT | Performed by: PEDIATRICS

## 2019-07-11 RX ORDER — ALBUTEROL SULFATE 90 UG/1
2 AEROSOL, METERED RESPIRATORY (INHALATION) EVERY 6 HOURS PRN
Qty: 1 INHALER | Refills: 0 | Status: SHIPPED | OUTPATIENT
Start: 2019-07-11 | End: 2019-08-07

## 2019-07-11 RX ORDER — BUPRENORPHINE AND NALOXONE 8; 2 MG/1; MG/1
1 FILM, SOLUBLE BUCCAL; SUBLINGUAL 2 TIMES DAILY
Qty: 30 FILM | Refills: 0 | Status: SHIPPED | OUTPATIENT
Start: 2019-07-11 | End: 2019-08-01
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
## 2019-07-12 ENCOUNTER — TELEPHONE (OUTPATIENT)
Dept: ADDICTION MEDICINE | Facility: CLINIC | Age: 39
End: 2019-07-12

## 2019-07-12 NOTE — TELEPHONE ENCOUNTER
Prior Authorization Retail Medication Request    Medication/Dose: buprenorphine HCl-naloxone HCl (SUBOXONE) 8-2 MG per film  ICD code (if different than what is on RX):    Previously Tried and Failed:    Rationale:      Insurance Name:  [phone]  Insurance ID:  [id number]      Pharmacy Information (if different than what is on RX)  [facility]  Phone:  [phone]

## 2019-07-12 NOTE — TELEPHONE ENCOUNTER
Central Prior Authorization Team   Phone: [phone]    Prior Authorization Not Needed per Insurance    Medication: buprenorphine HCl-naloxone HCl (SUBOXONE) 8-2 MG per film  Insurance Company: [employer] - Phone [phone] Fax [phone]  Expected CoPay:      Pharmacy Filling the Rx: [facility] [address] [place] [address] & [facility]  Pharmacy Notified: Yes  Patient Notified: Yes    PA not needed, medication went through insurance.

## 2019-07-26 ENCOUNTER — TELEPHONE (OUTPATIENT)
Dept: ADDICTION MEDICINE | Facility: CLINIC | Age: 39
End: 2019-07-26

## 2019-07-26 NOTE — TELEPHONE ENCOUNTER
Spoke with provider Khushi Lewis NP who has been her prescribing psychiatric provider. (HARMONY in place)  Patient is quite destabilized.  She has admitted to buying benzodiazepines off the dark web which would explain her clinical presentation and deterioration of symptoms.  Detoxification and inpatient hospitalization are being recommended by the psychiatric provider and I would concur.  She will be working with the patient's primary therapist today.  Encouraged him to call with questions or concerns.

## 2019-07-26 NOTE — TELEPHONE ENCOUNTER
Reason for call:  Other   Patient called regarding (reason for call): call back  Additional comments: would like to discuss willy condition with benzo  Phone number to reach patient:  Other phone number:  [phone]    Best Time:   Any time    Can we leave a detailed message on this number?  YES

## 2019-08-01 ENCOUNTER — TELEPHONE (OUTPATIENT)
Dept: ADDICTION MEDICINE | Facility: CLINIC | Age: 39
End: 2019-08-01

## 2019-08-01 DIAGNOSIS — F11.20 OPIOID USE DISORDER, SEVERE, DEPENDENCE (H): ICD-10-CM

## 2019-08-01 RX ORDER — BUPRENORPHINE AND NALOXONE 8; 2 MG/1; MG/1
1 FILM, SOLUBLE BUCCAL; SUBLINGUAL 2 TIMES DAILY
Qty: 2 FILM | Refills: 0 | Status: SHIPPED | OUTPATIENT
Start: 2019-08-01 | End: 2019-08-02

## 2019-08-01 NOTE — TELEPHONE ENCOUNTER
Reason for Call:  subx bridge    Do you use a [facility]?  Name of the pharmacy and phone number for the current request:  [facility] tel: [phone]    Name of the medication requested: buprenorphine HCl-naloxone HCl (SUBOXONE) 8-2 MG per film    Other request:   Last appointment: 7/11/19  Cancelled appointments: 7/24/19, 8/1/19  No Shows/missed appointments: N/A  Scheduled appointment: 8/2/19 @ 8:00  None to take as of: 1/2 dose 8/1/19  Date/Time/ amount of last dose: 1/2 dose 8/1/19    Pt informed to follow up with pharmacy for status of refill as addiction RN will only reach out if there are any issues or questions and will be addressed within one business day.    This request for a bridge is simply a request but doesn't guarantee you medication.    Can we leave a detailed message on this number? YES    Phone number patient can be reached at: Home number on file [phone] (home)    Best Time: anytime    Call taken on 8/1/2019 at 10:55 AM by Danielle Valdez

## 2019-08-02 ENCOUNTER — OFFICE VISIT (OUTPATIENT)
Dept: ADDICTION MEDICINE | Facility: CLINIC | Age: 39
End: 2019-08-02
Payer: COMMERCIAL

## 2019-08-02 ENCOUNTER — TELEPHONE (OUTPATIENT)
Dept: ADDICTION MEDICINE | Facility: CLINIC | Age: 39
End: 2019-08-02

## 2019-08-02 VITALS
SYSTOLIC BLOOD PRESSURE: 124 MMHG | TEMPERATURE: 97.5 F | HEART RATE: 55 BPM | OXYGEN SATURATION: 97 % | WEIGHT: 192 LBS | BODY MASS INDEX: 30.99 KG/M2 | DIASTOLIC BLOOD PRESSURE: 78 MMHG | RESPIRATION RATE: 18 BRPM

## 2019-08-02 DIAGNOSIS — F43.10 PTSD (POST-TRAUMATIC STRESS DISORDER): ICD-10-CM

## 2019-08-02 DIAGNOSIS — F31.89 OTHER BIPOLAR DISORDER (H): ICD-10-CM

## 2019-08-02 DIAGNOSIS — F11.20 OPIOID USE DISORDER, SEVERE, DEPENDENCE (H): Primary | ICD-10-CM

## 2019-08-02 DIAGNOSIS — F60.3 BORDERLINE PERSONALITY DISORDER (H): ICD-10-CM

## 2019-08-02 DIAGNOSIS — F13.10 BENZODIAZEPINE ABUSE (H): ICD-10-CM

## 2019-08-02 LAB
AMPHETAMINES UR QL: NOT DETECTED NG/ML
BARBITURATES UR QL SCN: NOT DETECTED NG/ML
BENZODIAZ UR QL SCN: ABNORMAL NG/ML
BUPRENORPHINE UR QL: ABNORMAL NG/ML
CANNABINOIDS UR QL: NOT DETECTED NG/ML
COCAINE UR QL SCN: NOT DETECTED NG/ML
D-METHAMPHET UR QL: NOT DETECTED NG/ML
METHADONE UR QL SCN: NOT DETECTED NG/ML
OPIATES UR QL SCN: NOT DETECTED NG/ML
OXYCODONE UR QL SCN: NOT DETECTED NG/ML
PCP UR QL SCN: ABNORMAL NG/ML
PROPOXYPH UR QL: NOT DETECTED NG/ML
TRICYCLICS UR QL SCN: NOT DETECTED NG/ML

## 2019-08-02 PROCEDURE — 99215 OFFICE O/P EST HI 40 MIN: CPT | Performed by: PEDIATRICS

## 2019-08-02 PROCEDURE — 80306 DRUG TEST PRSMV INSTRMNT: CPT | Performed by: PEDIATRICS

## 2019-08-02 RX ORDER — BUPRENORPHINE AND NALOXONE 8; 2 MG/1; MG/1
1 FILM, SOLUBLE BUCCAL; SUBLINGUAL 2 TIMES DAILY
Qty: 30 FILM | Refills: 0 | Status: SHIPPED | OUTPATIENT
Start: 2019-08-02 | End: 2019-08-28

## 2019-08-02 NOTE — TELEPHONE ENCOUNTER
Central Prior Authorization Team   Phone: [phone]    PA Initiation    Medication: buprenorphine/ naloxone alternative-PA NOT NEEDED  Insurance Company:    Pharmacy Filling the Rx:    Filling Pharmacy Phone:    Filling Pharmacy Fax:    Start Date: 8/2/2019    PA not needed, insurance prefers brand and it was processed at the pharmacy and is waiting for pickup.
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
## 2019-08-02 NOTE — TELEPHONE ENCOUNTER
Prior Authorization Retail Medication Request    Medication/Dose: buprenorphine HCl-naloxone HCl (SUBOXONE) 8-2 MG per film  ICD code (if different than what is on RX):  Opioid use disorder, severe, dependence (H) [F11.20]  Previously Tried and Failed:    Rationale:      Insurance Name:  DEBBYelisha MA  Insurance ID:  [id number]       Pharmacy Information (if different than what is on RX)  Name:  [id number]  Phone:  [phone]    Cheyenne Salinas RN  08/02/19  8:40 AM

## 2019-08-02 NOTE — TELEPHONE ENCOUNTER
Buprenorphine/ Naloxone not covered by patient plan.  Preferred alternative is:  BUPRENORPHINENALOSXONE, SUBOXONE, NARCAN, NALOXONEHCL

## 2019-08-06 DIAGNOSIS — R05.9 COUGH: ICD-10-CM

## 2019-08-06 ASSESSMENT — PATIENT HEALTH QUESTIONNAIRE - PHQ9: SUM OF ALL RESPONSES TO PHQ QUESTIONS 1-9: 11

## 2019-08-06 NOTE — TELEPHONE ENCOUNTER
Requested Prescriptions   Pending Prescriptions Disp Refills     albuterol (PROAIR HFA/PROVENTIL HFA/VENTOLIN HFA) 108 (90 Base) MCG/ACT inhaler 1 Inhaler 0     Sig: Inhale 2 puffs into the lungs every 6 hours as needed for shortness of breath / dyspnea or wheezing       There is no refill protocol information for this order      Last Written Prescription Date:  7-11-19  Last Fill Quantity: 8.5,  # refills: 0   Last office visit: 8/2/2019 with prescribing provider:  8-2-19   Future Office Visit:   Next 5 appointments (look out 90 days)    Aug 09, 2019  1:40 PM CDT  Return Visit with Maine Fox MD  [facility] ([facility]) [address] [place] [address]  [address]  [facility] [address] [phone]

## 2019-08-07 RX ORDER — ALBUTEROL SULFATE 90 UG/1
2 AEROSOL, METERED RESPIRATORY (INHALATION) EVERY 6 HOURS PRN
Qty: 1 INHALER | Refills: 0 | Status: SHIPPED | OUTPATIENT
Start: 2019-08-07 | End: 2019-09-24

## 2019-08-07 NOTE — TELEPHONE ENCOUNTER
Refill for: albuterol (PROAIR HFA/PROVENTIL HFA/VENTOLIN HFA) 108 (90 Base) MCG/ACT inhaler    Last Appointment: 8/2/19    Next Appointment: 8/9/19    No Shows/Cancellations since last appointment: none    Last Refill in Epic (date and amount/how many days):    Disp Refills Start End CECE   albuterol (PROAIR HFA/PROVENTIL HFA/VENTOLIN HFA) 108 (90 Base) MCG/ACT inhaler 1 Inhaler 0 7/11/2019  No   Sig - Route: Inhale 2 puffs into the lungs every 6 hours as needed for shortness of breath / dyspnea or wheezing - Inhalation       Patient has PCP outside of -- routing to  for approval.          Cheyenne Salinas RN  08/07/19  8:47 AM

## 2019-08-16 ENCOUNTER — TELEPHONE (OUTPATIENT)
Dept: ADDICTION MEDICINE | Facility: CLINIC | Age: 39
End: 2019-08-16

## 2019-08-16 NOTE — TELEPHONE ENCOUNTER
Reason for Call:  Other    Detailed comments: Conchis Santoro called Dr. Fox's office regarding a mutual patient. Stated patient has missed 2 appts with Conchis and her [facility] therapist has terminated her due to attendance. Unsure if she s seeing Khushi Acevedo anymore. Conchis left a message with Khushi. The plan has been for her to go to treatment. She assured Conchis that she would follow through it and she worries that she s not following through appropriately.    Conchis's Phone Number Patient can be reached at: [phone]    Best Time: Anytime though she has appts/may be with other patients    Can we leave a detailed message on this number? YES    Call taken on 8/16/2019 at 2:31 PM by Karolyn Johnson

## 2019-08-16 NOTE — TELEPHONE ENCOUNTER
Discussed with therapist Conchis ( [phone])  (cell [phone])  Reviewed patient missed appointment on 8/9 and they canceled on 8/16.  Agree with concerns regarding possible ongoing benzodiazepine use, need for further higher level of treatment with regard to chemical dependency and mental health and risks of potential overdose.  She will continue to work with patient on being seen.  Patient is currently rate scheduled for 8/26/2019.  Will update as needed.

## 2019-08-28 ENCOUNTER — OFFICE VISIT (OUTPATIENT)
Dept: ADDICTION MEDICINE | Facility: CLINIC | Age: 39
End: 2019-08-28
Payer: COMMERCIAL

## 2019-08-28 VITALS
SYSTOLIC BLOOD PRESSURE: 128 MMHG | HEART RATE: 88 BPM | OXYGEN SATURATION: 100 % | TEMPERATURE: 98.4 F | DIASTOLIC BLOOD PRESSURE: 88 MMHG | BODY MASS INDEX: 31.96 KG/M2 | RESPIRATION RATE: 18 BRPM | WEIGHT: 198 LBS

## 2019-08-28 DIAGNOSIS — F11.20 OPIOID USE DISORDER, SEVERE, DEPENDENCE (H): ICD-10-CM

## 2019-08-28 PROCEDURE — 80306 DRUG TEST PRSMV INSTRMNT: CPT | Performed by: PEDIATRICS

## 2019-08-28 PROCEDURE — 99215 OFFICE O/P EST HI 40 MIN: CPT | Performed by: PEDIATRICS

## 2019-08-28 RX ORDER — BUPRENORPHINE AND NALOXONE 8; 2 MG/1; MG/1
1 FILM, SOLUBLE BUCCAL; SUBLINGUAL 2 TIMES DAILY
Qty: 60 FILM | Refills: 0 | Status: SHIPPED | OUTPATIENT
Start: 2019-08-28 | End: 2019-09-19
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
## 2019-09-19 ENCOUNTER — TELEPHONE (OUTPATIENT)
Dept: ADDICTION MEDICINE | Facility: CLINIC | Age: 39
End: 2019-09-19

## 2019-09-19 ENCOUNTER — OFFICE VISIT (OUTPATIENT)
Dept: ADDICTION MEDICINE | Facility: CLINIC | Age: 39
End: 2019-09-19
Payer: COMMERCIAL

## 2019-09-19 VITALS
WEIGHT: 195 LBS | BODY MASS INDEX: 31.47 KG/M2 | DIASTOLIC BLOOD PRESSURE: 74 MMHG | TEMPERATURE: 97.9 F | HEART RATE: 62 BPM | OXYGEN SATURATION: 97 % | SYSTOLIC BLOOD PRESSURE: 108 MMHG | RESPIRATION RATE: 18 BRPM

## 2019-09-19 DIAGNOSIS — F60.3 BORDERLINE PERSONALITY DISORDER (H): ICD-10-CM

## 2019-09-19 DIAGNOSIS — F31.89 OTHER BIPOLAR DISORDER (H): ICD-10-CM

## 2019-09-19 DIAGNOSIS — F13.10 BENZODIAZEPINE ABUSE (H): Primary | ICD-10-CM

## 2019-09-19 DIAGNOSIS — F43.10 PTSD (POST-TRAUMATIC STRESS DISORDER): ICD-10-CM

## 2019-09-19 DIAGNOSIS — F33.41 RECURRENT MAJOR DEPRESSIVE DISORDER, IN PARTIAL REMISSION (H): ICD-10-CM

## 2019-09-19 DIAGNOSIS — F11.20 OPIOID USE DISORDER, SEVERE, DEPENDENCE (H): ICD-10-CM

## 2019-09-19 LAB
AMPHETAMINES UR QL: NOT DETECTED NG/ML
BARBITURATES UR QL SCN: NOT DETECTED NG/ML
BENZODIAZ UR QL SCN: ABNORMAL NG/ML
BUPRENORPHINE UR QL: ABNORMAL NG/ML
CANNABINOIDS UR QL: NOT DETECTED NG/ML
COCAINE UR QL SCN: NOT DETECTED NG/ML
D-METHAMPHET UR QL: NOT DETECTED NG/ML
METHADONE UR QL SCN: NOT DETECTED NG/ML
OPIATES UR QL SCN: NOT DETECTED NG/ML
OXYCODONE UR QL SCN: NOT DETECTED NG/ML
PCP UR QL SCN: NOT DETECTED NG/ML
PROPOXYPH UR QL: NOT DETECTED NG/ML
TRICYCLICS UR QL SCN: ABNORMAL NG/ML

## 2019-09-19 PROCEDURE — 80306 DRUG TEST PRSMV INSTRMNT: CPT | Performed by: PEDIATRICS

## 2019-09-19 PROCEDURE — 80346 BENZODIAZEPINES1-12: CPT | Mod: 90 | Performed by: PEDIATRICS

## 2019-09-19 PROCEDURE — 99000 SPECIMEN HANDLING OFFICE-LAB: CPT | Performed by: PEDIATRICS

## 2019-09-19 PROCEDURE — 99214 OFFICE O/P EST MOD 30 MIN: CPT | Performed by: PEDIATRICS

## 2019-09-19 RX ORDER — BUPRENORPHINE AND NALOXONE 8; 2 MG/1; MG/1
1 FILM, SOLUBLE BUCCAL; SUBLINGUAL 2 TIMES DAILY
Qty: 60 FILM | Refills: 0 | Status: SHIPPED | OUTPATIENT
Start: 2019-09-19 | End: 2019-11-05
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
## 2019-09-19 NOTE — TELEPHONE ENCOUNTER
Reason for Call:  SUMI    Detailed comments: Pt called stating that per her appt earlier with Dr. Fox she was to call back with the name of the pharmacy that the treatment facility she is going to uses. The pharmacy is [facility].    Phone Number Patient can be reached at: Home number on file [phone] (home)    Best Time: anytime    Can we leave a detailed message on this number? YES    Call taken on 9/19/2019 at 10:03 AM by Danielle Valdez

## 2019-09-23 DIAGNOSIS — R05.9 COUGH: ICD-10-CM

## 2019-09-23 NOTE — TELEPHONE ENCOUNTER
"Requested Prescriptions   Pending Prescriptions Disp Refills     albuterol (PROAIR HFA/PROVENTIL HFA/VENTOLIN HFA) 108 (90 Base) MCG/ACT inhaler  Last Written Prescription Date:  8/7/19  Last Fill Quantity: 1,  # refills: 0   Last office visit: 9/19/2019 with prescribing provider:     Future Office Visit:   Next 5 appointments (look out 90 days)    Oct 30, 2019 10:00 AM CDT  Return Visit with Maine Fox MD  [facility] ([facility]) [address] [place] [address]  [address]  [facility] [address]  [phone]            1 Inhaler 0     Sig: Inhale 2 puffs into the lungs every 6 hours as needed for shortness of breath / dyspnea or wheezing       Asthma Maintenance Inhalers - Anticholinergics Passed - 9/23/2019  2:41 PM        Passed - Patient is age 12 years or older        Passed - Recent (12 mo) or future (30 days) visit within the authorizing provider's specialty     Patient had office visit in the last 12 months or has a visit in the next 30 days with authorizing provider or within the authorizing provider's specialty.  See \"Patient Info\" tab in inbasket, or \"Choose Columns\" in Meds & Orders section of the refill encounter.              Passed - Medication is active on med list          "

## 2019-09-24 RX ORDER — ALBUTEROL SULFATE 90 UG/1
2 AEROSOL, METERED RESPIRATORY (INHALATION) EVERY 6 HOURS PRN
Qty: 1 INHALER | Refills: 0 | Status: SHIPPED | OUTPATIENT
Start: 2019-09-24 | End: 2019-10-16

## 2019-09-24 NOTE — TELEPHONE ENCOUNTER
Refill for: albuterol (PROAIR HFA/PROVENTIL HFA/VENTOLIN HFA) 108 (90 Base) MCG/ACT inhaler    Last Refill in Epic (date and amount/how many days):    Disp Refills Start End CECE   albuterol (PROAIR HFA/PROVENTIL HFA/VENTOLIN HFA) 108 (90 Base) MCG/ACT inhaler 1 Inhaler 0 8/7/2019  No   Sig - Route: Inhale 2 puffs into the lungs every 6 hours as needed for shortness of breath / dyspnea or wheezing - Inhalation        did refill this recently for patient.   Patient should really follow with PCP for this medication.   LVM instructing patient to follow up with PCP for management of this medication.    Routing to providers for review due to high drug-drug interaction risk.    Cheyenne Salinas RN  09/24/19  2:44 PM

## 2019-09-30 ENCOUNTER — TELEPHONE (OUTPATIENT)
Dept: ADDICTION MEDICINE | Facility: CLINIC | Age: 39
End: 2019-09-30

## 2019-09-30 NOTE — TELEPHONE ENCOUNTER
Reason for Call:  Medication request    Detailed comments: Pt called stating that the RX from 9/19/19 was never received by [facility] and that they need the rx called in.    Phone Number Patient can be reached at: [phone] ([facility])    Best Time: anytime    Can we leave a detailed message on this number? YES    Call taken on 9/30/2019 at 1:02 PM by Danielle Valdez

## 2019-09-30 NOTE — TELEPHONE ENCOUNTER
"I spoke with [facility] rep. Suboxone from 9/19/19 was received but \"never processed for unexplained reasons.\"     Message left for patient with treatment center team. Patient was in group at the time of my call.     "

## 2019-10-10 NOTE — TELEPHONE ENCOUNTER
Correction to below.  Last Gabapentin per   9/5/18   Spoke with patient via phone.   Last INR 2.9.  Dose maintained per protocol.   Today's INR is 2.7 and is within goal range.    Current warfarin dosing verified with patient. Patient and Hsivani was informed that their INR result is within therapeutic range and instructed to maintain current dose per protocol. Discussed dose and return date for next INR.    Dr. Chaidez is in the office today supervising the treatment.    Patient was instructed to contact the clinic with any unusual bleeding or bruising, any changes in medications, diet, health status, lifestyle, or any other changes, questions or concerns. Patient verbalized understanding of all discussed.

## 2019-10-16 DIAGNOSIS — R05.9 COUGH: ICD-10-CM

## 2019-10-16 RX ORDER — ALBUTEROL SULFATE 90 UG/1
2 AEROSOL, METERED RESPIRATORY (INHALATION) EVERY 6 HOURS PRN
Qty: 1 INHALER | Refills: 0 | Status: SHIPPED | OUTPATIENT
Start: 2019-10-16

## 2019-10-16 NOTE — TELEPHONE ENCOUNTER
"Requested Prescriptions   Pending Prescriptions Disp Refills     albuterol (PROAIR HFA/PROVENTIL HFA/VENTOLIN HFA) 108 (90 Base) MCG/ACT inhaler  Last Written Prescription Date:  9/24/19  Last Fill Quantity: 1 inhaler,  # refills: 0   Last office visit: No previous visit found with prescribing provider:     Future Office Visit:   Next 5 appointments (look out 90 days)    Oct 30, 2019 10:00 AM CDT  Return Visit with Maine Fox MD  [facility] ([facility]) [address] [place] [address]  [address]  [facility] [address]  [phone]          1 Inhaler 0     Sig: Inhale 2 puffs into the lungs every 6 hours as needed for shortness of breath / dyspnea or wheezing       Asthma Maintenance Inhalers - Anticholinergics Passed - 10/16/2019  9:44 AM        Passed - Patient is age 12 years or older        Passed - Recent (12 mo) or future (30 days) visit within the authorizing provider's specialty     Patient has had an office visit with the authorizing provider or a provider within the authorizing providers department within the previous 12 mos or has a future within next 30 days. See \"Patient Info\" tab in inbasket, or \"Choose Columns\" in Meds & Orders section of the refill encounter.              Passed - Medication is active on med list          "

## 2019-11-05 ENCOUNTER — APPOINTMENT (OUTPATIENT)
Dept: LAB | Facility: CLINIC | Age: 39
End: 2019-11-05
Payer: COMMERCIAL

## 2019-11-05 ENCOUNTER — TELEPHONE (OUTPATIENT)
Dept: ADDICTION MEDICINE | Facility: CLINIC | Age: 39
End: 2019-11-05

## 2019-11-05 DIAGNOSIS — F11.20 OPIOID USE DISORDER, SEVERE, DEPENDENCE (H): ICD-10-CM

## 2019-11-05 RX ORDER — BUPRENORPHINE AND NALOXONE 8; 2 MG/1; MG/1
1 FILM, SOLUBLE BUCCAL; SUBLINGUAL 2 TIMES DAILY
Qty: 4 FILM | Refills: 0 | Status: SHIPPED | OUTPATIENT
Start: 2019-11-05 | End: 2019-11-07

## 2019-11-05 NOTE — TELEPHONE ENCOUNTER
Reason for Call:  Running Late    Detailed comments: Pt called and is stuck in traffic. Pt estimates running a total of 20 mins late and is aware the provider might not be able to see her and is not guaranteed to be seen. Pt is aware she might have to leave a bridge request, a urine sample and schedule her next appt if provider is unable to see her.    Phone Number Patient can be reached at: Home number on file [phone] (home)    Best Time: anytime    Can we leave a detailed message on this number? YES    Call taken on 11/5/2019 at 8:46 AM by Danielle Valdez

## 2019-11-05 NOTE — TELEPHONE ENCOUNTER
I attempted to reach Yara to discuss fill dates. Her mailbox is full.     RX date 9/19/19 for Suboxone on on the . I attempted to call [facility] pharmacy to check fill dates. I was on hold for several minutes and lead through an automated system to an eventual voicemail that does not accept messages. Due to work-load, I abandon my effort to get the fill date of Suboxone for September.     The 8/28/19 supply should have lasted to 9/26/19. If she filled the 9/19/19 RX on 9/27/19, she should be out 10/26/19.  Patient states she took her last one today.   Medication pended for 2 day supply.  Routing to provider.      Refill for: buprenorphine HCl-naloxone HCl (SUBOXONE) 8-2mg    Last Appointment: 9/19/19    Next Appointment: 11/7/19    No Shows/Cancellations since last appointment: no show: today 11/5/19    Last Refill in Epic (date and amount/how many days):    Disp Refills Start End CECE   buprenorphine HCl-naloxone HCl (SUBOXONE) 8-2 MG per film 60 Film 0 9/19/2019  No   Sig - Route: Place 1 Film under the tongue 2 times daily [id number] - Sublingual   Sent to pharmacy as: buprenorphine HCl-naloxone HCl (SUBOXONE) 8-2 MG per film   Class: E-Prescribe   Notes to Pharmacy: KEIRA:[id number]         Most Recent UDS results: POS: benzodiazepines, tricyclic antidepressants and buprenorphine on 9/19/19     reviewed and summarized below:     RX date 9/19/19 for Suboxone is not on the          Debbie Patrick RN  11/05/19  2:30 PM

## 2019-11-05 NOTE — TELEPHONE ENCOUNTER
Reason for Call:  Medication Request due to: missed appointment - Pt. Went to the wrong location and reschedule for 11/07/19.    Name of the pharmacy and phone number for the current request:  [id number] [address] AT [place] [address] & [facility]    Request for bridge of: buprenorphine HCl-naloxone HCl (SUBOXONE) 8-2 MG per film    Other Information:   Taking as prescribed: Yes  Date/Time/ amount of last dose: this morning 11/05/19    Pt informed to follow up with pharmacy for status of refill as addiction RN will only reach out if there are any issues or questions and will be addressed within one business day.  Pt also informed that this request for a bridge is simply a request and doesn't guarantee the medication will be filled.    Can we leave a detailed message on this number? Yes    Phone number patient can be reached at: [phone]    Best Time: Anytime

## 2019-11-07 ENCOUNTER — OFFICE VISIT (OUTPATIENT)
Dept: ADDICTION MEDICINE | Facility: CLINIC | Age: 39
End: 2019-11-07
Payer: COMMERCIAL

## 2019-11-07 VITALS
WEIGHT: 207.2 LBS | BODY MASS INDEX: 33.44 KG/M2 | HEART RATE: 64 BPM | SYSTOLIC BLOOD PRESSURE: 96 MMHG | TEMPERATURE: 98.2 F | OXYGEN SATURATION: 98 % | DIASTOLIC BLOOD PRESSURE: 59 MMHG

## 2019-11-07 DIAGNOSIS — F43.10 PTSD (POST-TRAUMATIC STRESS DISORDER): ICD-10-CM

## 2019-11-07 DIAGNOSIS — F60.3 BORDERLINE PERSONALITY DISORDER (H): ICD-10-CM

## 2019-11-07 DIAGNOSIS — F33.41 RECURRENT MAJOR DEPRESSIVE DISORDER, IN PARTIAL REMISSION (H): ICD-10-CM

## 2019-11-07 DIAGNOSIS — F31.89 OTHER BIPOLAR DISORDER (H): ICD-10-CM

## 2019-11-07 DIAGNOSIS — F11.20 OPIOID USE DISORDER, SEVERE, DEPENDENCE (H): Primary | ICD-10-CM

## 2019-11-07 DIAGNOSIS — F13.10 BENZODIAZEPINE ABUSE (H): ICD-10-CM

## 2019-11-07 PROCEDURE — 99215 OFFICE O/P EST HI 40 MIN: CPT | Performed by: PEDIATRICS

## 2019-11-07 PROCEDURE — 80306 DRUG TEST PRSMV INSTRMNT: CPT | Performed by: PEDIATRICS

## 2019-11-07 PROCEDURE — 80346 BENZODIAZEPINES1-12: CPT | Mod: 90 | Performed by: PEDIATRICS

## 2019-11-07 PROCEDURE — 99000 SPECIMEN HANDLING OFFICE-LAB: CPT | Performed by: PEDIATRICS

## 2019-11-07 RX ORDER — LAMOTRIGINE 200 MG/1
200 TABLET ORAL 2 TIMES DAILY
Qty: 30 TABLET | Refills: 3 | COMMUNITY
Start: 2019-11-07

## 2019-11-07 RX ORDER — BUPRENORPHINE AND NALOXONE 8; 2 MG/1; MG/1
1 FILM, SOLUBLE BUCCAL; SUBLINGUAL 2 TIMES DAILY
Qty: 60 FILM | Refills: 0 | Status: SHIPPED | OUTPATIENT
Start: 2019-11-07 | End: 2020-01-23

## 2019-11-07 RX ORDER — GABAPENTIN 300 MG/1
600 CAPSULE ORAL 3 TIMES DAILY
Qty: 90 CAPSULE | Refills: 0 | COMMUNITY
Start: 2019-11-07

## 2019-11-07 RX ORDER — PRAZOSIN HYDROCHLORIDE 1 MG/1
1 CAPSULE ORAL AT BEDTIME
COMMUNITY
End: 2020-07-31
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
## 2019-12-12 DIAGNOSIS — F11.20 OPIOID USE DISORDER, SEVERE, DEPENDENCE (H): Primary | ICD-10-CM

## 2019-12-12 RX ORDER — BUPRENORPHINE AND NALOXONE 8; 2 MG/1; MG/1
1 FILM, SOLUBLE BUCCAL; SUBLINGUAL 2 TIMES DAILY
Qty: 14 FILM | Refills: 0 | Status: CANCELLED | OUTPATIENT
Start: 2019-12-12

## 2019-12-12 NOTE — TELEPHONE ENCOUNTER
I attempted to call Yara to offer assistance with finding insurance. My call went directly to [employer] and her mailbox was full. I was unable to leave a message.     This will be her 2nd bridge request since her last attended appointment.    Medication pended for a 7 day supply as patient does not have a follow-up scheduled and she is without insurance.     Routing to provider for review of appropriate bridge quantity.       Refill for: buprenorphine HCl-naloxone HCl (SUBOXONE) 8-2mg    Last Appointment: 11/7/19    Next Appointment: NOT SCHEDULE (No insurance)    No Shows/Cancellations since last appointment: cancelled: 12/5/19 and late cancel 12/12/19    Last Refill in Epic (date and amount/how many days):   Outpatient Medication Detail      Disp Refills Start End CECE   buprenorphine HCl-naloxone HCl (SUBOXONE) 8-2 MG per film 60 Film 0 11/7/2019  No   Sig - Route: Place 1 Film under the tongue 2 times daily [id number] - Sublingual   Sent to pharmacy as: buprenorphine HCl-naloxone HCl (SUBOXONE) 8-2 MG per film   Class: E-Prescribe   Notes to Pharmacy: KEIRA: [id number]         Most Recent UDS results: POS: benzodiazepines, tricyclic antidepressants and buprenorphine on 11/7/19     reviewed and summarized below:           Debbie Patrick RN  12/12/19  10:57 AM

## 2019-12-12 NOTE — TELEPHONE ENCOUNTER
Please continue to reach out to patient regarding insurance and timeline for follow up before a bridge will be provided.  Patient recently restabilized after a very unstable perioid.

## 2019-12-12 NOTE — TELEPHONE ENCOUNTER
Reason for Call:  Medication Request due to: needs to cancel appointment (reschedule if cancelling) -- Patient wanted to cancel because she does not have insurance. Did not reschedule because she does not know when she will have insurance again.    Name of the pharmacy and phone number for the current request:  [facility] [phone]    Request for bridge of: buprenorphine HCl-naloxone HCl (SUBOXONE) 8-2 MG per film    Other Information:   Taking as prescribed: No -- Only taking half of prescribed dose  Date/Time/ amount of last dose: 12/14 1/2 Dose    Pt informed to follow up with pharmacy for status of refill as addiction RN will only reach out if there are any issues or questions and will be addressed within one business day.  Pt also informed that this request for a bridge is simply a request and doesn't guarantee the medication will be filled.    Can we leave a detailed message on this number? Yes    Phone number patient can be reached at: [phone]    Best Time: Anytime

## 2019-12-13 RX ORDER — BUPRENORPHINE HYDROCHLORIDE AND NALOXONE HYDROCHLORIDE DIHYDRATE 8; 2 MG/1; MG/1
1 TABLET SUBLINGUAL 2 TIMES DAILY
Qty: 60 TABLET | Refills: 0 | Status: SHIPPED | OUTPATIENT
Start: 2019-12-13 | End: 2020-02-24

## 2019-12-13 NOTE — TELEPHONE ENCOUNTER
Phone call to Yara re: Suboxone bridge request and insurance lapse. Yara is working to reinstate insurance; reports she did not receive the renewal notice. Yara has reapplied online and [place] The [place] needs proof of income, but Yara has just started a new job so has to wait for her first paycheck before she is able to submit proof of income.     Informed Yara that once she has her first paycheck she may be be able to reinstate insurance more quickly if she takes it to a [facility] office, rather than mailing a copy or uploading it online.     Yara reports she has been stable on 8mg Suboxone daily (had to cut Suboxone dose in half to stretch out rx until insurance issue was resolved). Last dose will be today. She plans to make an appointment with Dr Fox as soon as insurance is reinstated.    Yara would like rx for Suboxone tablets until insurance is reinstated since tablets are less expensive and she will have to pay out of pocket at this time.    Bridge request for Suboxone tablets pended and routed to Dr Fox.    Rx for: buprenorphine HCl-naloxone HCl (SUBOXONE) 8-2mg tablets     Last Appointment: 11/7/19     Next Appointment: NOT SCHEDULE (No insurance)     No Shows/Cancellations since last appointment: cancelled: 12/5/19 and late cancel 12/12/19     Last Refill in Epic (date and amount/how many days):   Outpatient Medication Detail        Disp Refills Start End CECE   buprenorphine HCl-naloxone HCl (SUBOXONE) 8-2 MG per film 60 Film 0 11/7/2019   No   Sig - Route: Place 1 Film under the tongue 2 times daily [id number] - Sublingual   Sent to pharmacy as: buprenorphine HCl-naloxone HCl (SUBOXONE) 8-2 MG per film   Class: E-Prescribe   Notes to Pharmacy: KEIRA: [id number]      Most Recent UDS results: POS: benzodiazepines, tricyclic antidepressants and buprenorphine on 11/7/19      reviewed and summarized below:        Vanesa Villalobos RN on 12/13/2019 at 10:22 AM

## 2020-01-23 ENCOUNTER — OFFICE VISIT (OUTPATIENT)
Dept: ADDICTION MEDICINE | Facility: CLINIC | Age: 40
End: 2020-01-23
Payer: COMMERCIAL

## 2020-01-23 VITALS
HEART RATE: 74 BPM | SYSTOLIC BLOOD PRESSURE: 135 MMHG | DIASTOLIC BLOOD PRESSURE: 85 MMHG | BODY MASS INDEX: 33.89 KG/M2 | OXYGEN SATURATION: 98 % | WEIGHT: 210 LBS

## 2020-01-23 DIAGNOSIS — F13.10 BENZODIAZEPINE ABUSE (H): ICD-10-CM

## 2020-01-23 DIAGNOSIS — F11.20 OPIOID USE DISORDER, SEVERE, DEPENDENCE (H): Primary | ICD-10-CM

## 2020-01-23 DIAGNOSIS — F31.89 OTHER BIPOLAR DISORDER (H): ICD-10-CM

## 2020-01-23 DIAGNOSIS — F60.3 BORDERLINE PERSONALITY DISORDER (H): ICD-10-CM

## 2020-01-23 DIAGNOSIS — F33.41 RECURRENT MAJOR DEPRESSIVE DISORDER, IN PARTIAL REMISSION (H): ICD-10-CM

## 2020-01-23 DIAGNOSIS — F43.10 PTSD (POST-TRAUMATIC STRESS DISORDER): ICD-10-CM

## 2020-01-23 PROCEDURE — 80346 BENZODIAZEPINES1-12: CPT | Mod: 90 | Performed by: PEDIATRICS

## 2020-01-23 PROCEDURE — 99214 OFFICE O/P EST MOD 30 MIN: CPT | Performed by: PEDIATRICS

## 2020-01-23 PROCEDURE — 99000 SPECIMEN HANDLING OFFICE-LAB: CPT | Performed by: PEDIATRICS

## 2020-01-23 PROCEDURE — 80306 DRUG TEST PRSMV INSTRMNT: CPT | Performed by: PEDIATRICS

## 2020-01-23 RX ORDER — BUPRENORPHINE AND NALOXONE 8; 2 MG/1; MG/1
1 FILM, SOLUBLE BUCCAL; SUBLINGUAL 2 TIMES DAILY
Qty: 60 FILM | Refills: 0 | Status: SHIPPED | OUTPATIENT
Start: 2020-01-23 | End: 2020-02-24

## 2020-01-23 NOTE — NURSING NOTE
At last appointment had insurance changes and was given the pill, would like to be sure that she gets the strip this time.    Giovanna Hyatt MA
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
## 2020-02-24 ENCOUNTER — TELEPHONE (OUTPATIENT)
Dept: ADDICTION MEDICINE | Facility: CLINIC | Age: 40
End: 2020-02-24

## 2020-02-24 ENCOUNTER — OFFICE VISIT (OUTPATIENT)
Dept: ADDICTION MEDICINE | Facility: CLINIC | Age: 40
End: 2020-02-24
Payer: COMMERCIAL

## 2020-02-24 VITALS
HEART RATE: 63 BPM | DIASTOLIC BLOOD PRESSURE: 72 MMHG | BODY MASS INDEX: 34.31 KG/M2 | WEIGHT: 212.6 LBS | SYSTOLIC BLOOD PRESSURE: 112 MMHG

## 2020-02-24 DIAGNOSIS — F13.10 BENZODIAZEPINE ABUSE (H): ICD-10-CM

## 2020-02-24 DIAGNOSIS — F33.41 RECURRENT MAJOR DEPRESSIVE DISORDER, IN PARTIAL REMISSION (H): ICD-10-CM

## 2020-02-24 DIAGNOSIS — F31.89 OTHER BIPOLAR DISORDER (H): ICD-10-CM

## 2020-02-24 DIAGNOSIS — F11.20 OPIOID USE DISORDER, SEVERE, DEPENDENCE (H): Primary | ICD-10-CM

## 2020-02-24 DIAGNOSIS — F43.10 PTSD (POST-TRAUMATIC STRESS DISORDER): ICD-10-CM

## 2020-02-24 DIAGNOSIS — F60.3 BORDERLINE PERSONALITY DISORDER (H): ICD-10-CM

## 2020-02-24 PROCEDURE — 99000 SPECIMEN HANDLING OFFICE-LAB: CPT | Performed by: PEDIATRICS

## 2020-02-24 PROCEDURE — 80306 DRUG TEST PRSMV INSTRMNT: CPT | Performed by: PEDIATRICS

## 2020-02-24 PROCEDURE — 80346 BENZODIAZEPINES1-12: CPT | Mod: 90 | Performed by: PEDIATRICS

## 2020-02-24 PROCEDURE — 99214 OFFICE O/P EST MOD 30 MIN: CPT | Performed by: PEDIATRICS

## 2020-02-24 RX ORDER — BUPRENORPHINE AND NALOXONE 8; 2 MG/1; MG/1
1 FILM, SOLUBLE BUCCAL; SUBLINGUAL 2 TIMES DAILY
Qty: 60 FILM | Refills: 0 | Status: SHIPPED | OUTPATIENT
Start: 2020-02-24 | End: 2020-04-03

## 2020-02-24 NOTE — TELEPHONE ENCOUNTER
Alternative requested. Buprenorphine/nalox film not covered by patient plan. The preferred alternative is BUPRENORPHINENALOXONE, SUBOXONE, NARCAN, NALOXONEHCLPlanHelpdeskNumber. Please call/fax the pharmacy to change medication along with strength, directions, quantity, and refills.
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
## 2020-02-25 NOTE — TELEPHONE ENCOUNTER
Spoke with pharmacy-- Rx ran as name brand already and ready for .   No further action needed.    Cheyenne Salinas, RN    Nurse Liaison  [facility]    Addiction Medicine Services

## 2020-04-03 ENCOUNTER — VIRTUAL VISIT (OUTPATIENT)
Dept: ADDICTION MEDICINE | Facility: CLINIC | Age: 40
End: 2020-04-03
Payer: COMMERCIAL

## 2020-04-03 DIAGNOSIS — F60.3 BORDERLINE PERSONALITY DISORDER (H): ICD-10-CM

## 2020-04-03 DIAGNOSIS — F43.10 PTSD (POST-TRAUMATIC STRESS DISORDER): ICD-10-CM

## 2020-04-03 DIAGNOSIS — F11.20 OPIOID USE DISORDER, SEVERE, DEPENDENCE (H): ICD-10-CM

## 2020-04-03 DIAGNOSIS — F13.10 BENZODIAZEPINE ABUSE (H): Primary | ICD-10-CM

## 2020-04-03 DIAGNOSIS — F31.89 OTHER BIPOLAR DISORDER (H): ICD-10-CM

## 2020-04-03 DIAGNOSIS — Z79.899 HIGH RISK MEDICATION USE: ICD-10-CM

## 2020-04-03 DIAGNOSIS — F33.41 RECURRENT MAJOR DEPRESSIVE DISORDER, IN PARTIAL REMISSION (H): ICD-10-CM

## 2020-04-03 PROCEDURE — 99442 ZZC PHYSICIAN TELEPHONE EVALUATION 11-20 MIN: CPT | Performed by: PEDIATRICS

## 2020-04-03 RX ORDER — BUPRENORPHINE AND NALOXONE 8; 2 MG/1; MG/1
1 FILM, SOLUBLE BUCCAL; SUBLINGUAL 2 TIMES DAILY
Qty: 60 FILM | Refills: 0 | Status: SHIPPED | OUTPATIENT
Start: 2020-04-03 | End: 2020-05-01
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
## 2020-05-01 ENCOUNTER — VIRTUAL VISIT (OUTPATIENT)
Dept: ADDICTION MEDICINE | Facility: CLINIC | Age: 40
End: 2020-05-01
Payer: COMMERCIAL

## 2020-05-01 DIAGNOSIS — F11.20 OPIOID USE DISORDER, SEVERE, DEPENDENCE (H): ICD-10-CM

## 2020-05-01 PROCEDURE — 99214 OFFICE O/P EST MOD 30 MIN: CPT | Mod: 95 | Performed by: PEDIATRICS

## 2020-05-01 RX ORDER — BUPRENORPHINE AND NALOXONE 8; 2 MG/1; MG/1
1 FILM, SOLUBLE BUCCAL; SUBLINGUAL 2 TIMES DAILY
Qty: 60 FILM | Refills: 0 | Status: SHIPPED | OUTPATIENT
Start: 2020-05-01 | End: 2020-05-29
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
## 2020-05-29 ENCOUNTER — VIRTUAL VISIT (OUTPATIENT)
Dept: ADDICTION MEDICINE | Facility: CLINIC | Age: 40
End: 2020-05-29
Payer: COMMERCIAL

## 2020-05-29 ENCOUNTER — TELEPHONE (OUTPATIENT)
Dept: ADDICTION MEDICINE | Facility: CLINIC | Age: 40
End: 2020-05-29

## 2020-05-29 DIAGNOSIS — F43.10 PTSD (POST-TRAUMATIC STRESS DISORDER): ICD-10-CM

## 2020-05-29 DIAGNOSIS — F33.41 RECURRENT MAJOR DEPRESSIVE DISORDER, IN PARTIAL REMISSION (H): ICD-10-CM

## 2020-05-29 DIAGNOSIS — F13.10 BENZODIAZEPINE ABUSE (H): ICD-10-CM

## 2020-05-29 DIAGNOSIS — F11.20 OPIOID USE DISORDER, SEVERE, DEPENDENCE (H): Primary | ICD-10-CM

## 2020-05-29 DIAGNOSIS — F31.89 OTHER BIPOLAR DISORDER (H): ICD-10-CM

## 2020-05-29 DIAGNOSIS — F60.3 BORDERLINE PERSONALITY DISORDER (H): ICD-10-CM

## 2020-05-29 DIAGNOSIS — Z79.899 HIGH RISK MEDICATION USE: ICD-10-CM

## 2020-05-29 PROCEDURE — 99214 OFFICE O/P EST MOD 30 MIN: CPT | Mod: 95 | Performed by: PEDIATRICS

## 2020-05-29 RX ORDER — BUPRENORPHINE AND NALOXONE 8; 2 MG/1; MG/1
1 FILM, SOLUBLE BUCCAL; SUBLINGUAL 2 TIMES DAILY
Qty: 60 FILM | Refills: 0 | Status: SHIPPED | OUTPATIENT
Start: 2020-05-29 | End: 2020-07-28

## 2020-05-29 NOTE — TELEPHONE ENCOUNTER
Reason for Call:  Other returning call    Detailed comments: Patient returned clinic's call for her 10:00 video appointment today with Dr. Fox    Phone Number Patient can be reached at: Cell number on file:    Telephone Information:   Mobile [phone]       Call taken on 5/29/2020 at 10:08 AM by Karolyn Johnson
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
## 2020-06-01 ENCOUNTER — VIRTUAL VISIT (OUTPATIENT)
Dept: ADDICTION MEDICINE | Facility: CLINIC | Age: 40
End: 2020-06-01
Payer: COMMERCIAL

## 2020-06-01 DIAGNOSIS — Z53.9 ERRONEOUS ENCOUNTER--DISREGARD: Primary | ICD-10-CM

## 2020-06-01 NOTE — PROGRESS NOTES
Duplicate appt. Patient was able to be seen late on 5/29/20    This encounter was opened in error. Please disregard.

## 2020-06-11 ENCOUNTER — TELEPHONE (OUTPATIENT)
Dept: ADDICTION MEDICINE | Facility: CLINIC | Age: 40
End: 2020-06-11

## 2020-06-11 NOTE — TELEPHONE ENCOUNTER
Reason for Call:  Other FYI    Detailed comments: Patient wants to let Dr. Fox know that she was in the hospital for 8 days. She had lung failure. Kidney failure. States she also broke her ankle the day she went to the hospital (because she was having difficulty breathing). States they called it non-Covid-pneumonia and was in the ICU area for that. She was tested 3 times for Covid-10. She just spoke with therapist and therapist throught itd' be good to let Ruddy know.    Phone Number Patient can be reached at: Cell number on file:    Telephone Information:   Mobile [phone]       Best Time: currently, as she's recovering, she's available all the time    Can we leave a detailed message on this number? YES    Call taken on 6/11/2020 at 1:33 PM by Karolyn Johnson

## 2020-06-11 NOTE — TELEPHONE ENCOUNTER
Routing to provider as FYI.    Cheyenne Salinas, RN    Nurse Liaison  [facility]    Addiction Medicine Services

## 2020-07-28 ENCOUNTER — TELEPHONE (OUTPATIENT)
Dept: ADDICTION MEDICINE | Facility: CLINIC | Age: 40
End: 2020-07-28

## 2020-07-28 DIAGNOSIS — F11.20 OPIOID USE DISORDER, SEVERE, DEPENDENCE (H): ICD-10-CM

## 2020-07-28 NOTE — TELEPHONE ENCOUNTER
Reason for Call:  Other call back and prescription    Detailed comments: Patient call to let Dr. Fox know what pharmacy she needs her medication to go to, please call patient back for any further questions or concerns regarding this request.      [facility] [address]  [place], [place]   Ph: [phone]       Phone Number Patient can be reached at: Home number on file [phone] (home)    Best Time: ASAP     Can we leave a detailed message on this number? YES    Call taken on 7/28/2020 at 4:55 PM by Allie Rodriguez

## 2020-07-28 NOTE — TELEPHONE ENCOUNTER
Patient has had multiple hospitalizations since last visit on 5/29, making medication last longer.     Medication pended for 3 day supply.  Pharmacy requested by patient is not an option in Epic.   Writer attempted to contact patient-- no answer, LVM to call clinic back  If patient calls back, please ask for different pharmacy. Please be sure to attach the pharmacy to the encounter with patient on phone to make sure the pharmacy can be found.       Bridge request for: buprenorphine HCl-naloxone HCl (SUBOXONE) 8-2mg    Last Appointment: 5/29/20  Last visit note reviewed? yes    Follow up in: 4 weeks with      Next Appointment: 7/31/20    No Shows/Cancellations since last appointment: no show: 7/27 and cancelled: 6/29    Last Refill in Epic (date and amount/how many days):   Disp  Refills  Start  End  CECE     buprenorphine HCl-naloxone HCl (SUBOXONE) 8-2 MG per film  60 Film  0  5/29/2020   No    Sig - Route: Place 1 Film under the tongue 2 times daily [id number] - Sublingual      reviewed and summarized below:         Cheyenne Salinas, RN    Nurse Liaison  [facility]    Addiction Medicine Services

## 2020-07-28 NOTE — TELEPHONE ENCOUNTER
Reason for Call:  Medication Request due to: missed appointment    Name of the pharmacy and phone number for the current request:  [place] [facility] [phone]    Request for bridge of: buprenorphine HCl-naloxone HCl (SUBOXONE) 8-2 MG per film    Other Information:   Taking as prescribed: Yes  Date/Time/ amount of last dose: 07/28 1/2 Dose    Pt informed to follow up with pharmacy for status of refill as addiction RN will only reach out if there are any issues or questions and will be addressed within one business day.  Pt also informed that this request for a bridge is simply a request and doesn't guarantee the medication will be filled.    Can we leave a detailed message on this number? Yes    Phone number patient can be reached at: [phone]     Best Time: Anytime

## 2020-07-29 RX ORDER — BUPRENORPHINE AND NALOXONE 8; 2 MG/1; MG/1
1 FILM, SOLUBLE BUCCAL; SUBLINGUAL 2 TIMES DAILY
Qty: 4 FILM | Refills: 0 | Status: SHIPPED | OUTPATIENT
Start: 2020-07-29 | End: 2020-07-31

## 2020-07-29 NOTE — TELEPHONE ENCOUNTER
Patient appears to be receiving prescription for large amounts of narcotics over the past 1-1/2 months.  Please review with her if she is taking her Suboxone every day in addition to these opioids?  Please review what condition is currently being treated with the prescribed opioids.  If she has not been taking Suboxone daily and has been taking opioids she will not be able to restart Suboxone until she is weaned from the opioids to avoid precipitated withdrawal.

## 2020-07-29 NOTE — TELEPHONE ENCOUNTER
Spoke with patient to discuss the oxycodone scripts has has received per the . She states that she fell and fractured her ankle on June 2nd. The fall was  a result of her kidneys not filtering her medications. She was hospitalized x 2 for respiratory failure and acute kidney injury.     She is currently seeing a nephrologist and having tests completed at the [facility]. She has been there since last Monday. She is coming home tomorrow. She confirmed that she is not receiving inpatient services, but is staying at [facility] and receiving outpatient services.      She has 10/10 pain due to  wt bearing on injured extremity. She also has 10/10 chest pain that is related to her recent history of pneumonia. She states that she has been taking her suboxone everyday along with oxycodone. Her last dose was this morning.     In regards to the gap in her refill request, she reports that she found a full bottle of suboxone tablets,and she has been taking those.     Because she is coming back to [facility] tomorrow, she would like the script called to her regular pharmacy. Pharmacy updated. Encouraged her to keep Fridays' appointment, she verbalized she will be present.

## 2020-07-29 NOTE — TELEPHONE ENCOUNTER
As she is coming back tomorrow will rx # 4 for tomorrow and Friday prior to appt.   Will discuss further.

## 2020-07-31 ENCOUNTER — VIRTUAL VISIT (OUTPATIENT)
Dept: ADDICTION MEDICINE | Facility: CLINIC | Age: 40
End: 2020-07-31
Payer: COMMERCIAL

## 2020-07-31 DIAGNOSIS — F33.41 RECURRENT MAJOR DEPRESSIVE DISORDER, IN PARTIAL REMISSION (H): ICD-10-CM

## 2020-07-31 DIAGNOSIS — Z79.899 HIGH RISK MEDICATION USE: ICD-10-CM

## 2020-07-31 DIAGNOSIS — F11.20 OPIOID USE DISORDER, SEVERE, DEPENDENCE (H): Primary | ICD-10-CM

## 2020-07-31 DIAGNOSIS — F60.3 BORDERLINE PERSONALITY DISORDER (H): ICD-10-CM

## 2020-07-31 DIAGNOSIS — F31.89 OTHER BIPOLAR DISORDER (H): ICD-10-CM

## 2020-07-31 DIAGNOSIS — F13.10 BENZODIAZEPINE ABUSE (H): ICD-10-CM

## 2020-07-31 DIAGNOSIS — F11.20 OPIOID USE DISORDER, SEVERE, DEPENDENCE (H): ICD-10-CM

## 2020-07-31 DIAGNOSIS — F43.10 PTSD (POST-TRAUMATIC STRESS DISORDER): ICD-10-CM

## 2020-07-31 PROCEDURE — 99214 OFFICE O/P EST MOD 30 MIN: CPT | Mod: 95 | Performed by: PEDIATRICS

## 2020-07-31 RX ORDER — QUETIAPINE FUMARATE 100 MG/1
300 TABLET, FILM COATED ORAL
Qty: 60 TABLET | Refills: 1 | COMMUNITY
Start: 2020-07-31

## 2020-07-31 RX ORDER — BUPRENORPHINE AND NALOXONE 8; 2 MG/1; MG/1
1 FILM, SOLUBLE BUCCAL; SUBLINGUAL 2 TIMES DAILY
Qty: 60 FILM | Refills: 0 | Status: SHIPPED | OUTPATIENT
Start: 2020-07-31 | End: 2020-09-08

## 2020-07-31 RX ORDER — PROPRANOLOL HYDROCHLORIDE 40 MG/1
20 TABLET ORAL 2 TIMES DAILY
Qty: 100 TABLET | Refills: 0 | COMMUNITY
Start: 2020-07-31

## 2020-07-31 RX ORDER — BUPRENORPHINE AND NALOXONE 8; 2 MG/1; MG/1
1 FILM, SOLUBLE BUCCAL; SUBLINGUAL 2 TIMES DAILY
Qty: 60 FILM | Refills: 0 | Status: CANCELLED | OUTPATIENT
Start: 2020-07-31

## 2020-07-31 NOTE — TELEPHONE ENCOUNTER
Medication filled today.   Will need to discuss further refills at follow up.     Cheyenne Salinas RN    Nurse Liaison  [facility]    Addiction Medicine Services    
[FreeTextEntry1] : General: Awake and alert in no acute distress\par Psych: normal mood and affect\par HEENT: NC/AT, normal visual tracking\par Pulmonary: no respiratory distress, chest expansion appears symmetric\par CV: extremities are warm and well perfused\par Abd: non-distended\par Ext: no c/c/e\par \par Inspection: Non-antalgic gait, normal muscle bulk without asymmetry\par Palpation: +tenderness along piriformis, greater trochanter, SI joints R>L; no tenderness to palpation along lumbar paraspinals, iliac crests\par ROM: full with mild pain on R with hip flexion\par Strength: HF, KE, KF, DF, PF, EHL all 5/5 bilaterally\par Sensation: Intact at L2-S1 dermatomes bilaterally to light touch\par Reflexes: 1+/4 at bilateral Patellar (L2-4) and Achilles (S1). Mute Babinski, no clonus bilaterally.\par \par SLR: negative bilaterally.\par Gaenslen positive on Right\par BRANDI negative bilaterally. FADIR with some pain in the back on right

## 2020-08-13 ENCOUNTER — VIRTUAL VISIT (OUTPATIENT)
Dept: ADDICTION MEDICINE | Facility: CLINIC | Age: 40
End: 2020-08-13
Payer: COMMERCIAL

## 2020-08-13 DIAGNOSIS — Z53.9 NO SHOW: Primary | ICD-10-CM

## 2020-08-13 PROCEDURE — 99207 ZZC NO SHOW FOR SCHEDULED APPT: CPT | Performed by: PEDIATRICS
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
## 2020-08-27 ENCOUNTER — VIRTUAL VISIT (OUTPATIENT)
Dept: ADDICTION MEDICINE | Facility: CLINIC | Age: 40
End: 2020-08-27
Payer: COMMERCIAL

## 2020-08-27 DIAGNOSIS — F33.41 RECURRENT MAJOR DEPRESSIVE DISORDER, IN PARTIAL REMISSION (H): ICD-10-CM

## 2020-08-27 DIAGNOSIS — F13.10 BENZODIAZEPINE ABUSE (H): ICD-10-CM

## 2020-08-27 DIAGNOSIS — F11.20 OPIOID USE DISORDER, SEVERE, DEPENDENCE (H): Primary | ICD-10-CM

## 2020-08-27 DIAGNOSIS — F60.3 BORDERLINE PERSONALITY DISORDER (H): ICD-10-CM

## 2020-08-27 DIAGNOSIS — Z79.899 HIGH RISK MEDICATION USE: ICD-10-CM

## 2020-08-27 DIAGNOSIS — F31.89 OTHER BIPOLAR DISORDER (H): ICD-10-CM

## 2020-08-27 PROCEDURE — 99214 OFFICE O/P EST MOD 30 MIN: CPT | Mod: 95 | Performed by: PEDIATRICS

## 2020-09-08 ENCOUNTER — TELEPHONE (OUTPATIENT)
Dept: ADDICTION MEDICINE | Facility: CLINIC | Age: 40
End: 2020-09-08

## 2020-09-08 DIAGNOSIS — F11.20 OPIOID USE DISORDER, SEVERE, DEPENDENCE (H): ICD-10-CM

## 2020-09-08 NOTE — TELEPHONE ENCOUNTER
Spoke with patient and updated her the need to schedule a lab only appointment for a Utox, and once results are received the bridge will completed. Patient verbalized understanding.

## 2020-09-08 NOTE — TELEPHONE ENCOUNTER
Medication pended for 30 day supply.  Routing to provider.    Bridge request for: buprenorphine HCl-naloxone HCl (SUBOXONE) 8-2mg    Last Appointment: 8/27/20    Next Appointment: 9/24/20 in person    No Shows/Cancellations since last appointment: none    Last Refill in Epic (date and amount/how many days):   buprenorphine HCl-naloxone HCl (SUBOXONE) 8-2 MG per film  60 Film  0  7/31/2020   No    Sig - Route: Place 1 Film under the tongue 2 times daily - Sublingual    Sent to pharmacy as: Buprenorphine HCl-Naloxone HCl 8-2 MG Sublingual Film (SUBOXONE)    Class: E-Prescribe    Notes to Pharmacy: KEIRA: [id number]    Order: [id number]    E-Prescribing Status: Receipt confirmed by pharmacy (7/31/2020  9:08 AM CDT)      Most Recent UDS results: POS: benzodiazepines, tricyclic antidepressants and buprenorphine on 2/24/20     reviewed and summarized below:           Lashae Little RN  09/08/20  12:23 PM

## 2020-09-08 NOTE — TELEPHONE ENCOUNTER
Please ask patient to do a lab only for Utox at [facility] or [facility].  Will plan bridge accordingly.  (current is submitted for 30 day? )

## 2020-09-09 NOTE — TELEPHONE ENCOUNTER
As of 8:45 AM, no UDS has been completed.   Patient has not yet scheduled a lab appointment for UDS.     Will check again this afternoon.    Cheyenne Salinas RN    Nurse Liaison  [facility]    Addiction Medicine Services

## 2020-09-10 DIAGNOSIS — F11.20 OPIOID USE DISORDER, SEVERE, DEPENDENCE (H): ICD-10-CM

## 2020-09-10 PROCEDURE — 80306 DRUG TEST PRSMV INSTRMNT: CPT | Performed by: PEDIATRICS

## 2020-09-10 RX ORDER — BUPRENORPHINE AND NALOXONE 8; 2 MG/1; MG/1
1 FILM, SOLUBLE BUCCAL; SUBLINGUAL 2 TIMES DAILY
Qty: 30 FILM | Refills: 0 | Status: SHIPPED | OUTPATIENT
Start: 2020-09-10 | End: 2020-09-24

## 2020-09-10 NOTE — TELEPHONE ENCOUNTER
9- at 12:45 PM    Walk-in.    Patient presented to Dr. Carter's [place] clinic. Patient said she has to do a lab and was told she could just walk on in/stop by to do that. Added patient to the lab schedule (so  would have something to work out of/be able to processes specimen).          Karolyn Johnson  Patient Representative  [facility] [place]

## 2020-09-10 NOTE — TELEPHONE ENCOUNTER
Patient came today to complete the requested Utox. Patient is positive for benzodiazepines, tricyclic antidepressants, and buprenorphine.     Provider requested UA to be competed prior to bridge completion. Changed pended script from 30 days to 15 days.    Medication pended for 15 day supply.  Routing to covering  Provider.

## 2020-09-24 ENCOUNTER — TELEPHONE (OUTPATIENT)
Dept: ADDICTION MEDICINE | Facility: CLINIC | Age: 40
End: 2020-09-24

## 2020-09-24 DIAGNOSIS — F11.20 OPIOID USE DISORDER, SEVERE, DEPENDENCE (H): ICD-10-CM

## 2020-09-24 RX ORDER — BUPRENORPHINE AND NALOXONE 8; 2 MG/1; MG/1
1 FILM, SOLUBLE BUCCAL; SUBLINGUAL 2 TIMES DAILY
Qty: 30 FILM | Refills: 0 | Status: SHIPPED | OUTPATIENT
Start: 2020-09-24

## 2020-09-24 NOTE — TELEPHONE ENCOUNTER
Medication pended for 14 day supply.  Routing to provider.      Bridge request for: buprenorphine HCl-naloxone HCl (SUBOXONE) 8-2mg    Last Appointment: 8/27/20  Last visit note reviewed? yes    Follow up in: 4 weeks with  via in-person visit.    Next Appointment: 10/8/20 in person    No Shows/Cancellations since last appointment: no show: 9/24    Last Refill in Epic (date and amount/how many days):    Disp  Refills  Start  End  CECE    buprenorphine HCl-naloxone HCl (SUBOXONE) 8-2 MG per film  30 Film  0  9/10/2020   No    Sig - Route: Place 1 Film under the tongue 2 times daily - Sublingual      Most Recent UDS results: POS: benzodiazepines, tricyclic antidepressants and buprenorphine on 9/10     reviewed and summarized below:         Cheyenne Salinas, RN    Nurse Liaison  [facility]    Addiction Medicine Services

## 2020-09-24 NOTE — TELEPHONE ENCOUNTER
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 [address] [place] [address] & [facility]  [phone]    Request for bridge of: buprenorphine HCl-naloxone HCl (SUBOXONE) 8-2 MG per film    Other Information:   Taking as prescribed: Yes  Date/Time/ amount of last dose: 09/24 1/2 Dose    Pt informed to follow up with pharmacy for status of refill as addiction RN will only reach out if there are any issues or questions and will be addressed within one business day.  Pt also informed that this request for a bridge is simply a request and doesn't guarantee the medication will be filled.    Can we leave a detailed message on this number? Yes    Phone number patient can be reached at: [phone]    Best Time: Anytime

## 2020-10-27 ENCOUNTER — TRANSCRIBE ORDERS (OUTPATIENT)
Dept: OTHER | Age: 40
End: 2020-10-27

## 2020-10-27 DIAGNOSIS — K21.9 ESOPHAGEAL REFLUX: Primary | ICD-10-CM

## 2020-11-22 ENCOUNTER — HEALTH MAINTENANCE LETTER (OUTPATIENT)
Age: 40
End: 2020-11-22

## 2021-09-18 ENCOUNTER — HEALTH MAINTENANCE LETTER (OUTPATIENT)
Age: 41
End: 2021-09-18

## 2022-01-08 ENCOUNTER — HEALTH MAINTENANCE LETTER (OUTPATIENT)
Age: 42
End: 2022-01-08

## 2022-08-09 NOTE — PROGRESS NOTES
Decrease in topiramate seems to have been effective for her c/o fogginess. Address anxiety coping (skin picking) with therapist. OK to use hydroxyzine sparingly (25-50 mg TID prn).   Total Dose (Optional-Please Include Units): 5385.60 cGy

## 2022-11-20 ENCOUNTER — HEALTH MAINTENANCE LETTER (OUTPATIENT)
Age: 42
End: 2022-11-20
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
## 2024-08-27 NOTE — TELEPHONE ENCOUNTER
Left message to call back.     Refill for: buprenorphine HCl-naloxone HCl (SUBOXONE) 8-2 MG per film    Last Appointment: 7/11/19    Next Appointment: 8/2/19    No Shows/Cancellations since last appointment: cancelled 7/24, 8/1    Last Refill in Epic (date and amount/how many days):    Disp Refills Start End CECE   buprenorphine HCl-naloxone HCl (SUBOXONE) 8-2 MG per film 30 Film 0 7/11/2019  No   Sig - Route: Place 1 Film under the tongue 2 times daily [id number] - Sublingual     Most Recent UDS results: POS for methamphetamines, benzodiazepines, buprenorphine on 7/11     reviewed and summarized below:   Fill Date Drug      Qty  Days  Prescriber  07/30/2019 Diazepam 10 Mg Tablet  20 5  [facility]  07/26/2019 Gabapentin 600 Mg Tablet   90 30  [facility]  07/12/2019  Suboxone 8 Mg-2 Mg Sl Film  30 15  Ei Bur     11:03 AM: Writer attempted to call patient to discuss recent dosing as patient should have run out of medication on 7/27. Patient did not answer-- LVM to return call to clinic.    Will pend and route 1 day bridge as patient will not have full dose for today.       Cheyenne Salinas RN  08/01/19  10:58 AM

## 2024-10-29 NOTE — TELEPHONE ENCOUNTER
-Incoming fax from [facility]  -Included are records from facility including progress notes, labs, EKG.  -Medical Discharge Summary will be faxed once available  -Original placed in Dr. Mendes's folder for review.     Specialty Pharmacy Refill Coordination Note     Pao is a 42 y.o. female contacted today regarding refills of  Humira specialty medication(s).    Reviewed and verified with patient:  Allergies  Meds       Specialty medication(s) and dose(s) confirmed: yes    Refill Questions      Flowsheet Row Most Recent Value   Changes to allergies? No   Changes to medications? No   New conditions or infections since last clinic visit No   If yes, please describe  N/A   Unplanned office visit, urgent care, ED, or hospital admission in the last 4 weeks  No   How does patient/caregiver feel medication is working? Very good   Financial problems or insurance changes  No   If yes, describe changes in insurance or financial issues. N/A   Since the previous refill, were any specialty medication doses or scheduled injections missed or delayed?  No  [Next dose due 11/1/2024]   If yes, please provide the amount N/A   Why were doses missed? N/A   Does this patient require a clinical escalation to a pharmacist? No            Delivery Questions      Flowsheet Row Most Recent Value   Delivery method UPS   Delivery address verified with patient/caregiver? Yes   Delivery address Home   Number of medications in delivery 1   Medication(s) being filled and delivered Adalimumab (Humira (2 Pen))   Doses left of specialty medications 2   Copay verified? Yes   Copay amount $0.00   Copay form of payment No copayment ($0)   Ship Date 10/29/2024   Delivery Date 10/30/2024   Signature Required No                   Follow-up: 23 day(s)     Robin Madrid, Pharmacy Technician  Specialty Pharmacy Technician

## 2025-03-06 ENCOUNTER — HOSPITAL ENCOUNTER (OUTPATIENT)
Facility: CLINIC | Age: 45
Setting detail: OBSERVATION
Discharge: HOME | End: 2025-03-07
Payer: COMMERCIAL

## 2025-03-06 DIAGNOSIS — S41.112A LACERATION OF LEFT UPPER ARM, INITIAL ENCOUNTER: ICD-10-CM

## 2025-03-06 DIAGNOSIS — G47.52 REM SLEEP BEHAVIOR DISORDER: ICD-10-CM

## 2025-03-06 DIAGNOSIS — F31.81 BIPOLAR 2 DISORDER (H): ICD-10-CM

## 2025-03-06 DIAGNOSIS — F11.20 OPIOID USE DISORDER, SEVERE, ON MAINTENANCE THERAPY (H): ICD-10-CM

## 2025-03-06 DIAGNOSIS — S51.812A LACERATION OF LEFT FOREARM, INITIAL ENCOUNTER: ICD-10-CM

## 2025-03-06 DIAGNOSIS — S61.412A LACERATION OF LEFT HAND WITHOUT FOREIGN BODY, INITIAL ENCOUNTER: ICD-10-CM

## 2025-03-06 DIAGNOSIS — F43.10 PTSD (POST-TRAUMATIC STRESS DISORDER): ICD-10-CM

## 2025-03-06 PROCEDURE — 250N000013 HC RX MED GY IP 250 OP 250 PS 637

## 2025-03-06 PROCEDURE — 90471 IMMUNIZATION ADMIN: CPT

## 2025-03-06 PROCEDURE — 36415 COLL VENOUS BLD VENIPUNCTURE: CPT

## 2025-03-06 PROCEDURE — 85025 COMPLETE CBC W/AUTO DIFF WBC: CPT

## 2025-03-06 PROCEDURE — 12002 RPR S/N/AX/GEN/TRNK2.6-7.5CM: CPT

## 2025-03-06 PROCEDURE — 99285 EMERGENCY DEPT VISIT HI MDM: CPT | Mod: 25

## 2025-03-06 PROCEDURE — 99285 EMERGENCY DEPT VISIT HI MDM: CPT

## 2025-03-06 PROCEDURE — 84132 ASSAY OF SERUM POTASSIUM: CPT

## 2025-03-06 PROCEDURE — 250N000011 HC RX IP 250 OP 636

## 2025-03-06 PROCEDURE — 90715 TDAP VACCINE 7 YRS/> IM: CPT

## 2025-03-06 PROCEDURE — 82040 ASSAY OF SERUM ALBUMIN: CPT

## 2025-03-06 PROCEDURE — 85018 HEMOGLOBIN: CPT

## 2025-03-06 PROCEDURE — 80053 COMPREHEN METABOLIC PANEL: CPT

## 2025-03-06 RX ORDER — CLONAZEPAM 0.5 MG/1
0.5 TABLET ORAL 2 TIMES DAILY
Status: DISCONTINUED | OUTPATIENT
Start: 2025-03-06 | End: 2025-03-06

## 2025-03-06 RX ORDER — CLONAZEPAM 1 MG/1
2 TABLET ORAL AT BEDTIME
Status: DISCONTINUED | OUTPATIENT
Start: 2025-03-06 | End: 2025-03-07 | Stop reason: HOSPADM

## 2025-03-06 RX ORDER — ACETAMINOPHEN 500 MG
1000 TABLET ORAL ONCE
Status: COMPLETED | OUTPATIENT
Start: 2025-03-06 | End: 2025-03-06

## 2025-03-06 RX ADMIN — CLOSTRIDIUM TETANI TOXOID ANTIGEN (FORMALDEHYDE INACTIVATED), CORYNEBACTERIUM DIPHTHERIAE TOXOID ANTIGEN (FORMALDEHYDE INACTIVATED), BORDETELLA PERTUSSIS TOXOID ANTIGEN (GLUTARALDEHYDE INACTIVATED), BORDETELLA PERTUSSIS FILAMENTOUS HEMAGGLUTININ ANTIGEN (FORMALDEHYDE INACTIVATED), BORDETELLA PERTUSSIS PERTACTIN ANTIGEN, AND BORDETELLA PERTUSSIS FIMBRIAE 2/3 ANTIGEN 0.5 ML: 5; 2; 2.5; 5; 3; 5 INJECTION, SUSPENSION INTRAMUSCULAR at 21:12

## 2025-03-06 RX ADMIN — ACETAMINOPHEN 1000 MG: 500 TABLET, FILM COATED ORAL at 23:46

## 2025-03-06 RX ADMIN — CLONAZEPAM 2 MG: 0.5 TABLET ORAL at 21:15

## 2025-03-06 ASSESSMENT — COLUMBIA-SUICIDE SEVERITY RATING SCALE - C-SSRS
2. HAVE YOU ACTUALLY HAD ANY THOUGHTS OF KILLING YOURSELF IN THE PAST MONTH?: YES
4. HAVE YOU HAD THESE THOUGHTS AND HAD SOME INTENTION OF ACTING ON THEM?: YES
3. HAVE YOU BEEN THINKING ABOUT HOW YOU MIGHT KILL YOURSELF?: YES
5. HAVE YOU STARTED TO WORK OUT OR WORKED OUT THE DETAILS OF HOW TO KILL YOURSELF? DO YOU INTEND TO CARRY OUT THIS PLAN?: NO
1. IN THE PAST MONTH, HAVE YOU WISHED YOU WERE DEAD OR WISHED YOU COULD GO TO SLEEP AND NOT WAKE UP?: NO
6. HAVE YOU EVER DONE ANYTHING, STARTED TO DO ANYTHING, OR PREPARED TO DO ANYTHING TO END YOUR LIFE?: YES

## 2025-03-06 ASSESSMENT — ACTIVITIES OF DAILY LIVING (ADL)
ADLS_ACUITY_SCORE: 41

## 2025-03-07 VITALS
OXYGEN SATURATION: 99 % | DIASTOLIC BLOOD PRESSURE: 96 MMHG | HEART RATE: 67 BPM | TEMPERATURE: 97.9 F | SYSTOLIC BLOOD PRESSURE: 156 MMHG | RESPIRATION RATE: 16 BRPM

## 2025-03-07 PROBLEM — R45.89 AT RISK FOR INTENTIONAL SELF-HARM: Status: ACTIVE | Noted: 2025-03-07

## 2025-03-07 PROBLEM — S61.412A LACERATION OF LEFT HAND WITHOUT FOREIGN BODY, INITIAL ENCOUNTER: Status: ACTIVE | Noted: 2025-03-07

## 2025-03-07 PROBLEM — S51.812A LACERATION OF LEFT FOREARM, INITIAL ENCOUNTER: Status: ACTIVE | Noted: 2025-03-07

## 2025-03-07 PROBLEM — S41.112A: Status: ACTIVE | Noted: 2025-03-07

## 2025-03-07 PROBLEM — R45.851 SUICIDAL IDEATION: Status: ACTIVE | Noted: 2025-03-07

## 2025-03-07 PROBLEM — F11.20 OPIOID USE DISORDER, SEVERE, ON MAINTENANCE THERAPY (H): Status: ACTIVE | Noted: 2025-03-07

## 2025-03-07 LAB
ALBUMIN SERPL BCG-MCNC: 4 G/DL (ref 3.5–5.2)
ALP SERPL-CCNC: 80 U/L (ref 40–150)
ALT SERPL W P-5'-P-CCNC: 24 U/L (ref 0–50)
ANION GAP SERPL CALCULATED.3IONS-SCNC: 15 MMOL/L (ref 7–15)
AST SERPL W P-5'-P-CCNC: 32 U/L (ref 0–45)
BASOPHILS # BLD AUTO: 0 10E3/UL (ref 0–0.2)
BASOPHILS NFR BLD AUTO: 1 %
BILIRUB SERPL-MCNC: 0.2 MG/DL
BUN SERPL-MCNC: 13.2 MG/DL (ref 6–20)
CALCIUM SERPL-MCNC: 8.8 MG/DL (ref 8.8–10.4)
CHLORIDE SERPL-SCNC: 104 MMOL/L (ref 98–107)
CREAT SERPL-MCNC: 0.76 MG/DL (ref 0.51–0.95)
EGFRCR SERPLBLD CKD-EPI 2021: >90 ML/MIN/1.73M2
EOSINOPHIL # BLD AUTO: 0.1 10E3/UL (ref 0–0.7)
EOSINOPHIL NFR BLD AUTO: 1 %
ERYTHROCYTE [DISTWIDTH] IN BLOOD BY AUTOMATED COUNT: 13.9 % (ref 10–15)
GLUCOSE SERPL-MCNC: 126 MG/DL (ref 70–99)
HCO3 SERPL-SCNC: 21 MMOL/L (ref 22–29)
HCT VFR BLD AUTO: 35.5 % (ref 35–47)
HGB BLD-MCNC: 11.6 G/DL (ref 11.7–15.7)
IMM GRANULOCYTES # BLD: 0 10E3/UL
IMM GRANULOCYTES NFR BLD: 0 %
LYMPHOCYTES # BLD AUTO: 2.3 10E3/UL (ref 0.8–5.3)
LYMPHOCYTES NFR BLD AUTO: 31 %
MCH RBC QN AUTO: 27.7 PG (ref 26.5–33)
MCHC RBC AUTO-ENTMCNC: 32.7 G/DL (ref 31.5–36.5)
MCV RBC AUTO: 85 FL (ref 78–100)
MONOCYTES # BLD AUTO: 0.5 10E3/UL (ref 0–1.3)
MONOCYTES NFR BLD AUTO: 6 %
NEUTROPHILS # BLD AUTO: 4.4 10E3/UL (ref 1.6–8.3)
NEUTROPHILS NFR BLD AUTO: 61 %
NRBC # BLD AUTO: 0 10E3/UL
NRBC BLD AUTO-RTO: 0 /100
PLATELET # BLD AUTO: 229 10E3/UL (ref 150–450)
POTASSIUM SERPL-SCNC: 4 MMOL/L (ref 3.4–5.3)
PROT SERPL-MCNC: 6.4 G/DL (ref 6.4–8.3)
RBC # BLD AUTO: 4.19 10E6/UL (ref 3.8–5.2)
SODIUM SERPL-SCNC: 140 MMOL/L (ref 135–145)
WBC # BLD AUTO: 7.3 10E3/UL (ref 4–11)

## 2025-03-07 PROCEDURE — 250N000013 HC RX MED GY IP 250 OP 250 PS 637: Performed by: PSYCHIATRY & NEUROLOGY

## 2025-03-07 PROCEDURE — G0378 HOSPITAL OBSERVATION PER HR: HCPCS

## 2025-03-07 PROCEDURE — 250N000013 HC RX MED GY IP 250 OP 250 PS 637: Performed by: STUDENT IN AN ORGANIZED HEALTH CARE EDUCATION/TRAINING PROGRAM

## 2025-03-07 PROCEDURE — 99234 HOSP IP/OBS SM DT SF/LOW 45: CPT | Performed by: PSYCHIATRY & NEUROLOGY

## 2025-03-07 RX ORDER — OLANZAPINE 10 MG/2ML
10 INJECTION, POWDER, FOR SOLUTION INTRAMUSCULAR 2 TIMES DAILY PRN
Status: DISCONTINUED | OUTPATIENT
Start: 2025-03-07 | End: 2025-03-07 | Stop reason: HOSPADM

## 2025-03-07 RX ORDER — IBUPROFEN 600 MG/1
600 TABLET, FILM COATED ORAL EVERY 4 HOURS PRN
Status: DISCONTINUED | OUTPATIENT
Start: 2025-03-07 | End: 2025-03-07 | Stop reason: HOSPADM

## 2025-03-07 RX ORDER — OLANZAPINE 10 MG/1
10 TABLET, ORALLY DISINTEGRATING ORAL 2 TIMES DAILY PRN
Status: DISCONTINUED | OUTPATIENT
Start: 2025-03-07 | End: 2025-03-07 | Stop reason: HOSPADM

## 2025-03-07 RX ORDER — HYDROXYZINE HYDROCHLORIDE 50 MG/1
50 TABLET, FILM COATED ORAL EVERY 6 HOURS PRN
Status: DISCONTINUED | OUTPATIENT
Start: 2025-03-07 | End: 2025-03-07 | Stop reason: HOSPADM

## 2025-03-07 RX ORDER — ALBUTEROL SULFATE 90 UG/1
2 INHALANT RESPIRATORY (INHALATION) EVERY 6 HOURS PRN
Status: DISCONTINUED | OUTPATIENT
Start: 2025-03-07 | End: 2025-03-07 | Stop reason: HOSPADM

## 2025-03-07 RX ORDER — LAMOTRIGINE 150 MG/1
300 TABLET ORAL DAILY
COMMUNITY

## 2025-03-07 RX ORDER — PANTOPRAZOLE SODIUM 40 MG/1
40 TABLET, DELAYED RELEASE ORAL
Status: DISCONTINUED | OUTPATIENT
Start: 2025-03-07 | End: 2025-03-07 | Stop reason: HOSPADM

## 2025-03-07 RX ORDER — LEVOTHYROXINE SODIUM 137 UG/1
137 TABLET ORAL
Status: DISCONTINUED | OUTPATIENT
Start: 2025-03-07 | End: 2025-03-07 | Stop reason: HOSPADM

## 2025-03-07 RX ORDER — PROPRANOLOL HYDROCHLORIDE 10 MG/1
20 TABLET ORAL DAILY PRN
Status: DISCONTINUED | OUTPATIENT
Start: 2025-03-07 | End: 2025-03-07 | Stop reason: HOSPADM

## 2025-03-07 RX ORDER — BUPRENORPHINE 10 UG/H
1 PATCH TRANSDERMAL WEEKLY
COMMUNITY
Start: 2025-02-14

## 2025-03-07 RX ORDER — RAMELTEON 8 MG/1
8 TABLET ORAL AT BEDTIME
Status: DISCONTINUED | OUTPATIENT
Start: 2025-03-07 | End: 2025-03-07 | Stop reason: HOSPADM

## 2025-03-07 RX ORDER — OLANZAPINE 10 MG/1
10 TABLET, ORALLY DISINTEGRATING ORAL 2 TIMES DAILY PRN
Status: DISCONTINUED | OUTPATIENT
Start: 2025-03-07 | End: 2025-03-07

## 2025-03-07 RX ORDER — LAMOTRIGINE 100 MG/1
300 TABLET, EXTENDED RELEASE ORAL AT BEDTIME
Status: DISCONTINUED | OUTPATIENT
Start: 2025-03-07 | End: 2025-03-07 | Stop reason: HOSPADM

## 2025-03-07 RX ORDER — ACETAMINOPHEN 325 MG/1
650 TABLET ORAL EVERY 4 HOURS PRN
Status: DISCONTINUED | OUTPATIENT
Start: 2025-03-07 | End: 2025-03-07 | Stop reason: HOSPADM

## 2025-03-07 RX ADMIN — LEVOTHYROXINE SODIUM 137 MCG: 137 TABLET ORAL at 08:32

## 2025-03-07 RX ADMIN — IBUPROFEN 600 MG: 600 TABLET ORAL at 11:13

## 2025-03-07 RX ADMIN — ACETAMINOPHEN 650 MG: 325 TABLET, FILM COATED ORAL at 07:30

## 2025-03-07 RX ADMIN — PANTOPRAZOLE SODIUM 40 MG: 40 TABLET, DELAYED RELEASE ORAL at 07:30

## 2025-03-07 RX ADMIN — HYDROXYZINE HYDROCHLORIDE 50 MG: 50 TABLET, FILM COATED ORAL at 03:34

## 2025-03-07 RX ADMIN — QUETIAPINE FUMARATE 300 MG: 100 TABLET ORAL at 03:33

## 2025-03-07 ASSESSMENT — ACTIVITIES OF DAILY LIVING (ADL)
ADLS_ACUITY_SCORE: 41

## 2025-03-07 ASSESSMENT — COLUMBIA-SUICIDE SEVERITY RATING SCALE - C-SSRS
1. HAVE YOU WISHED YOU WERE DEAD OR WISHED YOU COULD GO TO SLEEP AND NOT WAKE UP?: YES
2. HAVE YOU ACTUALLY HAD ANY THOUGHTS OF KILLING YOURSELF?: YES
2. HAVE YOU ACTUALLY HAD ANY THOUGHTS OF KILLING YOURSELF?: NO
3. HAVE YOU BEEN THINKING ABOUT HOW YOU MIGHT KILL YOURSELF?: YES
ATTEMPT LIFETIME: YES
5. HAVE YOU STARTED TO WORK OUT OR WORKED OUT THE DETAILS OF HOW TO KILL YOURSELF? DO YOU INTEND TO CARRY OUT THIS PLAN?: NO
4. HAVE YOU HAD THESE THOUGHTS AND HAD SOME INTENTION OF ACTING ON THEM?: NO
ATTEMPT PAST THREE MONTHS: NO
1. IN THE PAST MONTH, HAVE YOU WISHED YOU WERE DEAD OR WISHED YOU COULD GO TO SLEEP AND NOT WAKE UP?: NO
REASONS FOR IDEATION PAST MONTH: DOES NOT APPLY

## 2025-03-07 NOTE — PROGRESS NOTES
"Pt awake and moved out of sensory room to use restroom. Approached RN station and requested tylenol for left arm pain. Vitals assessed and stable. Pt is calm and cooperative, appropriately social with staff.  She states she is confused why she acted on self-harm urges as she hadn't for many years prior. Pt reports feeling hopeless related to her sleep disorder and often feels like \"I'm not in the real world\". 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 She is denying SI at this time. Pt given breakfast tray. Provided with ginger ale and apple juice per her request. Provided with extra warm blankets and left arm propped on pillows for comfort.   "

## 2025-03-07 NOTE — ED TRIAGE NOTES
Patient presents to ED via EMS due to multiple lacerations on left arm. The patient called EMS due to a condition called REM that has affected her mental health. She states she has moments where she doesn't know if she is asleep or awake and she has been cutting herself to differentiate between her being awake and her being asleep. She has a history of passive SI but states this cutting event wasn't with the desire kill herself.Several lacerations on the left arm varying in depth. EMS wrapped the patients arm. Upon arrival to the ED bleeding seemed to minimize. Patient is A/Ox4 upon arrival to the ED.     Triage Assessment (Adult)       Row Name 03/06/25 1943          Triage Assessment    Airway WDL WDL        Respiratory WDL    Respiratory WDL WDL        Skin Circulation/Temperature WDL    Skin Circulation/Temperature WDL WDL        Cardiac WDL    Cardiac WDL WDL        Peripheral/Neurovascular WDL    Peripheral Neurovascular WDL WDL

## 2025-03-07 NOTE — PROGRESS NOTES
DEC order acknowledged. Writer was notified at 3:30 AM that pt had awoken to use the restroom and 2 [facility] staff members encouraged pt to engage in DEC assessment, however, pt refused. Will attempt when pt is willing to engage in assessment.     Kathy Schmidt, [facility], [facility] on 3/7/2025 at 3:33 AM

## 2025-03-07 NOTE — ED NOTES
ED APC SUPERVISION NOTE:   I evaluated this patient in conjunction with Fredo Duran PA-C  I have participated in the care of the patient and personally performed key elements of the history, exam, and medical decision making.      HPI:   Yara Edouard is a 44 year old female with a history of REM sleep disorder, dissociative disorder, bipolar disorder, and substance abuse who presents to the Emergency Department for self-harm activity.   She states that she has been in a mixed state recently.  At times she is not aware if she is awake or not.  Today she engaged in self-harm behavior.  She reports that she used a clean razor.  She feels that she has been having hallucinations and psychosis.  Reports compliance with her medication and her psychiatrist.  Did drink some alcohol today.  Denies any other injuries or concerns.    Independent Historian:   None    Review of External Notes: I reviewed the patient's 4/29/24 ED note in which the she reported she has been noticing a decline in her cognitive skills over the past 10 years. She reports a history of several head injuries and problems with sleep.       EXAM:   General: Resting on the bed.  Head: No obvious trauma to head.  Ears, Nose, Throat:  External ears normal.  Nose normal.   Eyes:  Conjunctivae clear.    CV: Regular rate and rhythm.  No murmurs.      Respiratory: Effort normal and breath sounds normal.  No wheezing or crackles.   Gastrointestinal: Soft.  No distension. There is no tenderness.  There is no rigidity, no rebound and no guarding.   Musculoskeletal: Left arm with normal range of motion at all joints.  2+ radial pulses.   5 out of 5.  Sensation intact to light touch.  No bony tenderness appreciable.  Neuro: Alert. Moving all extremities appropriately.  Normal speech.    Skin: Skin is warm and dry.  Numerous lacerations of varying depth Sheree over the left upper extremity.  Please see PA note for imaging.  Psych: Calm and cooperative.   Reports hallucinations and evidence of psychosis.  Reports suicidal ideation and self-harm behaviors.    Independent Interpretation (X-rays, CTs, rhythm strip):  None    Consultations/Discussion of Management or Tests:  None     MIPS:      None    MEDICAL DECISION MAKING/ASSESSMENT AND PLAN:   44-year-old female with history of bipolar disorder presents to the emergency department with self-harm behavior.  Vital signs are stable.  Broad differential was considered including not limited to overdose, ingestion, trauma, laceration, anemia, etc.  Patient did have significant cuts of visible on the left upper extremity.  Please see PA note for further details and repair and management.  Hemoglobin is stable at 11.6 no significant anemia.  No leukocytosis.  BMP without acute electrolyte, metabolic or renal dysfunction.  LFTs are reassuring.  Patient does endorse drinking alcohol.  Patient was observed for several hours in the emergency department appear to be clinically sober and appropriate for DEC assessment.  Denies any street drug use.  Denies any overdose or other ingestion.  No other signs of trauma on head to toe examination.  Lacerations are of varying depths but there is no evidence of bony abnormality, neurovascularly intact distal, no tendon laceration etc. on examination.  Patient should have sutures removed in the next 10 days.  Recommendation for DEC.  Patient will be transferred to [facility] for DEC assessment as she is calm and cooperative.  Medically cleared and appears appropriate for mental health evaluation.  Patient on a DANIELLA until DEC assessment can be complete.     DIAGNOSIS:     ICD-10-CM    1. Laceration of left forearm, initial encounter  S51.812A       2. Laceration of left hand without foreign body, initial encounter  S61.412A       3. Laceration of left upper arm, initial encounter  S41.112A       4. At risk for intentional self-harm  R45.89       5. Suicidal ideation  R45.851             Scribe  Disclosure:  I, Slime Houston, am serving as a scribe at 8:34 PM on 3/6/2025 to document services personally performed by Deisy Witt MD based on my observations and the provider's statements to me.        Deisy Witt MD  03/07/25 0119

## 2025-03-07 NOTE — ED PROVIDER NOTES
Emergency Department Note      History of Present Illness     Chief Complaint   Hallucinations and Laceration      HPI   Yara Edouard is a 44 year old female with history of REM sleep disorder, dissociative disorder, BPD, bipolar disorder, and substance abuse who presents for evaluation of self-harm activity.  Patient states she cut her left arm multiple times today.  States when she is unable to differentiate whether or not she is asleep or awake she cuts herself.  States she has not done this activity for about 15 years however she describes having a relapse today.  States this episode happened around 3 hours ago.  States she was not trying to kill herself with this cutting episode today although she does endorse passive thoughts of suicide for the past several months.  She denies any other actions to harm herself such as overdosing on medications.  States she has been taking her psychiatric medications as prescribed.  Patient also endorses drinking 750 mL of alcohol today.  Patient states she has had increase stressors in her life, was arguing with her mother this morning.    Independent Historian   None    Review of External Notes   None    Past Medical History     Medical History and Problem List   Past Medical History:   Diagnosis Date    Anxiety     Bipolar 2 disorder (H)     Borderline personality disorder (H)     Chronic UTI     Substance abuse (H) 2013       Medications   albuterol (PROAIR HFA/PROVENTIL HFA/VENTOLIN HFA) 108 (90 Base) MCG/ACT inhaler  ascorbic acid (VITAMIN C) 1000 MG TABS  bisacodyl (DULCOLAX) 5 MG EC tablet  buprenorphine HCl-naloxone HCl (SUBOXONE) 8-2 MG per film  Calcium-Magnesium-Zinc 167-83-8 MG TABS  cholecalciferol (VITAMIN D3) 97333 UNITS capsule  chromium 200 MCG CAPS capsule  fish oil-omega-3 fatty acids 1000 MG capsule  FLUoxetine (PROZAC) 40 MG capsule  gabapentin (NEURONTIN) 300 MG capsule  ibuprofen (ADVIL,MOTRIN) 200 MG tablet  L-Theanine 100 MG CAPS  lamoTRIgine  (LAMICTAL) 200 MG tablet  Melatonin 10 MG TABS tablet  methylfolate 7.5 MG TABS  Misc Natural Products (7-KETO LEAN PO)  omeprazole (PRILOSEC) 20 MG CR capsule  propranolol (INDERAL) 40 MG tablet  QUEtiapine (SEROQUEL) 100 MG tablet        Surgical History   Past Surgical History:   Procedure Laterality Date    CHOLECYSTECTOMY      ENT SURGERY      tonsils    GENITOURINARY SURGERY      hydroextension of bladder       Physical Exam     Patient Vitals for the past 24 hrs:   BP Temp Temp src Pulse Resp SpO2   03/06/25 2300 133/85 -- -- 69 -- --   03/06/25 2240 129/82 -- -- 69 -- --   03/06/25 2220 133/79 -- -- 69 -- --   03/06/25 2200 130/84 -- -- 66 -- --   03/06/25 1948 -- -- -- -- -- 94 %   03/06/25 1941 -- 98.8  F (37.1  C) Oral -- 16 --   03/06/25 1938 (!) 112/94 -- -- 95 -- --     Physical Exam  Physical Exam:  GENERAL: Warm, dry, alert, no increased work of breathing, sitting up on gurney tearful, not toxic appearing  HEENT: No scleral icterus, PERRL, clear conjunctiva  NECK: No stiffness or restricted range of motion  HEART: Regular rate and rhythm  LUNGS: CTAB, moving air well.  No crackles or wheezes are heard.  ABD: Nondistended  BACK: No obvious deformities  EXTREMITIES: Moves all extremities without difficulty, no calf tenderness or peripheral edema.  Left arm and hand have multiple lacerations, please see attached images.  She was neurovascularly intact distal, 2+ radial and ulnar pulses, normal sensation along the axillary, radial, ulnar, and median nerve distributions  SKIN: Warm and dry  NEUROLOGICAL: No focal deficits.    PSYCH: Tearful                  Diagnostics     Lab Results   Labs Ordered and Resulted from Time of ED Arrival to Time of ED Departure   COMPREHENSIVE METABOLIC PANEL - Abnormal       Result Value    Sodium 140      Potassium 4.0      Carbon Dioxide (CO2) 21 (*)     Anion Gap 15      Urea Nitrogen 13.2      Creatinine 0.76      GFR Estimate >90      Calcium 8.8      Chloride 104       Glucose 126 (*)     Alkaline Phosphatase 80      AST 32      ALT 24      Protein Total 6.4      Albumin 4.0      Bilirubin Total 0.2     CBC WITH PLATELETS AND DIFFERENTIAL - Abnormal    WBC Count 7.3      RBC Count 4.19      Hemoglobin 11.6 (*)     Hematocrit 35.5      MCV 85      MCH 27.7      MCHC 32.7      RDW 13.9      Platelet Count 229      % Neutrophils 61      % Lymphocytes 31      % Monocytes 6      % Eosinophils 1      % Basophils 1      % Immature Granulocytes 0      NRBCs per 100 WBC 0      Absolute Neutrophils 4.4      Absolute Lymphocytes 2.3      Absolute Monocytes 0.5      Absolute Eosinophils 0.1      Absolute Basophils 0.0      Absolute Immature Granulocytes 0.0      Absolute NRBCs 0.0         Imaging   No orders to display         Independent Interpretation   None    ED Course      Medications Administered   Medications   clonazePAM (klonoPIN) tablet 2 mg (2 mg Oral $Given 3/6/25 2115)   Tdap (tetanus-diphtheria-acell pertussis) (ADACEL) injection 0.5 mL (0.5 mLs Intramuscular $Given 3/6/25 2112)   acetaminophen (TYLENOL) tablet 1,000 mg (1,000 mg Oral $Given 3/6/25 2346)       Procedures   Procedures     Laceration Repair      Procedure: Laceration Repair    Indication: Laceration    Consent: Verbal    Tetanus status reviewed    Location: Left Hand , Wrist , Forearm , and Upper extremity     Length: Ranging from 1 cm to 5 cm multiple lacerations     Preparation: Irrigation with Sterile Saline.    Anesthesia/Sedation: Lidocaine with Epinephrine - 1%, total of 50 mL was used      Treatment/Exploration: Wound explored, no foreign bodies found     Closure: The wound was closed with one layer. Skin/superficial layer was closed with 72 x 4-0 Nylon using Interrupted sutures.     Patient Status: The patient tolerated the procedure well: Yes. There were no complications.   Discussion of Management   Staffed with Dr. Witt  ED Course        Additional Documentation  None    Medical Decision Making /  Diagnosis     CMS Diagnoses: None    MIPS       None    MDM   Yara Edouard is a 44 year old female with history of REM sleep disorder, dissociative disorder, BPD, bipolar disorder, and substance abuse who presents for evaluation of self-harm activity.  Patient explains she has passive suicidal ideation, has had thoughts of hurting herself over the past several months.  States today's actions were not an attempt to kill herself rather she was attempting to differentiate between sleep and not sleep.  Patient's tetanus was updated today.  She was given her as needed clonazepam.  There were no significant metabolic derangements.  She had very mild anemia otherwise no leukocytosis and no thrombocytopenia.  Lacerations were closed as noted above.  There were several superficial lacerations not amenable to primary closure that were covered with bacitracin and bandaged.  Sutures should be removed in 10 days.  Patient is a good candidate for empath who agreed to accept patient.  Patient was sent to empath unit.  Given UNIQUE level 2 and need for DANIELLA, I staffed this patient with Dr. Witt.    Disposition   The patient was transferred to [facility].     Diagnosis     ICD-10-CM    1. Laceration of left forearm, initial encounter  S51.812A       2. Laceration of left hand without foreign body, initial encounter  S61.412A       3. Laceration of left upper arm, initial encounter  S41.112A       4. At risk for intentional self-harm  R45.89       5. Suicidal ideation  R45.851            Discharge Medications   New Prescriptions    No medications on file         VALERIA Palacios John, PA-C  03/07/25 0059

## 2025-03-07 NOTE — PSYCH
Writer received call from unit nurse will additional patient home medications confirmed by pharmacy. Orders placed for the following medications per nurse report: Lamictal XR 300mg nightly, Seroquel 300mg at bedtime prn, propranolol 20mg daily prn, Synthroid 137mcg daily, Prozac 40mg daily, Rozerem 8mg at bedtime, Protonix 40mg daily (substitute for omeprazole 40mg).

## 2025-03-07 NOTE — ED PROVIDER NOTES
"[facility] Unit - Psychiatry  Combined Observation Note and Discharge Summary  [facility] Emergency Department  Observation Initiation Date: Mar 6, 2025    Yara Edouard MRN: [id number]   Age: 44 year old YOB: 1980     History     Chief Complaint   Patient presents with    Hallucinations    Laceration     HPI  Yara Edouard is a 44 year old female with a past history notable for bipolar disorder, substance use disorders, and a sleep/REM disorder who was brought to the emergency room for a mental health evaluation following an episode where she inflicted multiple lacerations on her left arm.  She had alluded to the possibility of engaging in self-injurious behavior during a \"twilight\" episode associated with her sleep disorder.  She did not report suicidal intent as being related to the cutting.  She had also consumed a fair amount of alcohol on that particular day.  She received multiple sutures to repair the lacerations.  Once determined to be medically stable, she was transferred to the [facility] unit to proceed with a psychiatric assessment.  She is now approaching 16 hours in the emergency department.  Overnight, there were no acute issues.  On examination today, the patient was noted to be sleeping in a sensory room.  She awoke easily and accompanied me to the interview room.  She expressed regret and remorse for the events which preceded her arrival.  She continues to lack clear memory of the incident although does recall walking to a location where she could access a drawer that contained a razor blade.  For reasons that are not clear to her, alluding to the possibility that she was in a delirious or dreamlike state, she began to inflict multiple cuts along her left arm.  When her realization became clear, she aborted the cutting and pursued to help.  She denied symptoms of a depressive episode or vijaya preceding the incident.  She did not endorse intermittent alcohol use however described her use " to be more heavy on that particular day.  No recent withdrawal symptoms related to alcohol endorsed.  Her primary care provider has been managing her psychotropic medications which have been fairly stable.  She has been adherent with her medications and finds them to be helpful and well-tolerated.  She notes that there has been some adjustment to her Suboxone, transitioning off of oral Suboxone due to concern that it was causing problems with her teeth, and transitioning to the transdermal patch.  The dose has been increased since its initiation to find an equivalent dose to her oral Suboxone which was 16 mg a day in 2 divided doses.  She is content with her current medication plan and is not seeking any changes today.  She denied suicidal and homicidal thoughts.  There was no indication of psychosis.  She would like referrals for outpatient mental health providers explaining that she was previously under the care of a therapist for 10+ years.  She misses her dogs and the comforts of home.  She sees her outpatient primary care provider every other week and maintains good rapport with that person.  She interprets readiness to discharge home today.      Past Medical History  Past Medical History:   Diagnosis Date    Anxiety     Bipolar 2 disorder (H)     Borderline personality disorder (H)     Chronic UTI     Substance abuse (H) 2013    polysubstance abuse     Past Surgical History:   Procedure Laterality Date    CHOLECYSTECTOMY      ENT SURGERY      tonsils    GENITOURINARY SURGERY      hydroextension of bladder     FLUoxetine (PROZAC) 40 MG capsule  lamoTRIgine (LAMICTAL) 150 MG tablet  omeprazole (PRILOSEC) 20 MG CR capsule  albuterol (PROAIR HFA/PROVENTIL HFA/VENTOLIN HFA) 108 (90 Base) MCG/ACT inhaler  ascorbic acid (VITAMIN C) 1000 MG TABS  bisacodyl (DULCOLAX) 5 MG EC tablet  buprenorphine HCl-naloxone HCl (SUBOXONE) 8-2 MG per film  Calcium-Magnesium-Zinc 167-83-8 MG TABS  cholecalciferol (VITAMIN D3) 46525  UNITS capsule  chromium 200 MCG CAPS capsule  fish oil-omega-3 fatty acids 1000 MG capsule  gabapentin (NEURONTIN) 300 MG capsule  ibuprofen (ADVIL,MOTRIN) 200 MG tablet  L-Theanine 100 MG CAPS  lamoTRIgine (LAMICTAL) 200 MG tablet  Melatonin 10 MG TABS tablet  methylfolate 7.5 MG TABS  Misc Natural Products (7-KETO LEAN PO)  propranolol (INDERAL) 40 MG tablet  QUEtiapine (SEROQUEL) 100 MG tablet      Allergies   Allergen Reactions    Semaglutide GI Disturbance and Unknown     Told me this caused pancreatitis    Wellbutrin [Bupropion Hydrobromide] Other (See Comments)     When used in early 2000's, seemed to cause psychosis, but in retrospect, pt. believes this was due to excessive alcohol use at the time. [SCO 6/7/18]    Ceclor [Cefaclor Monohydrate]     Erythromycin      Family History  No family history on file.  Social History   Social History     Tobacco Use    Smoking status: Every Day     Current packs/day: 1.00     Types: Cigarettes    Smokeless tobacco: Current   Substance Use Topics    Alcohol use: No    Drug use: Yes     Comment: kratom pills 10-15/daily. Also abusing gabapentin          Review of Systems  A medically appropriate review of systems was performed with pertinent positives and negatives noted in the HPI, and all other systems negative.    Physical Examination   BP: (!) 112/94  Pulse: 95  Temp: 98.8  F (37.1  C)  Resp: 16  SpO2: 94 %    Physical Exam  General: Appears stated age.   Neuro: Alert and fully oriented. Extremities appear to demonstrate normal strength on visual inspection.   Integumentary/Skin: no rash visualized, normal color    Psychiatric Examination   Appearance: awake, alert  Attitude:  cooperative  Eye Contact:  fair  Mood:  better  Affect:  appropriate and in normal range  Speech:  clear, coherent  Psychomotor Behavior:  no evidence of tardive dyskinesia, dystonia, or tics  Thought Process:  logical and linear  Associations:  no loose associations  Thought Content:  no  evidence of suicidal ideation or homicidal ideation and no evidence of psychotic thought  Insight:  fair  Judgement:  fair  Oriented to:  time, person, and place  Attention Span and Concentration:  fair  Recent and Remote Memory:  fair  Language: able to name/identify objects without impairment  Fund of Knowledge: intact with awareness of current and past events    ED Course        Labs Ordered and Resulted from Time of ED Arrival to Time of ED Departure   COMPREHENSIVE METABOLIC PANEL - Abnormal       Result Value    Sodium 140      Potassium 4.0      Carbon Dioxide (CO2) 21 (*)     Anion Gap 15      Urea Nitrogen 13.2      Creatinine 0.76      GFR Estimate >90      Calcium 8.8      Chloride 104      Glucose 126 (*)     Alkaline Phosphatase 80      AST 32      ALT 24      Protein Total 6.4      Albumin 4.0      Bilirubin Total 0.2     CBC WITH PLATELETS AND DIFFERENTIAL - Abnormal    WBC Count 7.3      RBC Count 4.19      Hemoglobin 11.6 (*)     Hematocrit 35.5      MCV 85      MCH 27.7      MCHC 32.7      RDW 13.9      Platelet Count 229      % Neutrophils 61      % Lymphocytes 31      % Monocytes 6      % Eosinophils 1      % Basophils 1      % Immature Granulocytes 0      NRBCs per 100 WBC 0      Absolute Neutrophils 4.4      Absolute Lymphocytes 2.3      Absolute Monocytes 0.5      Absolute Eosinophils 0.1      Absolute Basophils 0.0      Absolute Immature Granulocytes 0.0      Absolute NRBCs 0.0         Assessments & Plan (with Medical Decision Making)   Patient presenting with concern related to an episode of self-injurious behavior where she inflicted multiple lacerations with a razor blade along her left arm.  Many of these lacerations were repaired with sutures while in the emergency department.  She did not characterize a manic or depressive episode preceding the incident.  There did not appear to be any corresponding psychosocial stressors.  Given her description and subjective interpretation, etiology  could be related to a state of delirium related to a bout of heavy alcohol use and/or behaviors related to her sleep disorder.  Her treatment plan focused on ensuring acute stability, reviewing her medication plan, and helping to coordinate referrals to outpatient mental health providers.  Nursing notes reviewed noting no acute issues.     I have reviewed the assessment completed by the [facility].     During the observation period, the patient did not require medications for agitation, and did not require restraints/seclusion for patient and/or provider safety.    The patient was found to have a psychiatric condition that would benefit from an observation stay in the emergency department for further psychiatric stabilization and/or coordination of a safe disposition. The observation plan includes serial assessments of psychiatric condition, potential administration of medications if indicated, further disposition pending the patient's psychiatric course during the monitoring period.     Preliminary diagnosis:    ICD-10-CM    1. Laceration of left forearm, initial encounter  S51.812A       2. Laceration of left hand without foreign body, initial encounter  S61.412A       3. Laceration of left upper arm, initial encounter  S41.112A       4. Bipolar 2 disorder (H)  F31.81       5. REM sleep behavior disorder  G47.52       6. PTSD (post-traumatic stress disorder)  F43.10       7. Opioid use disorder, severe, on maintenance therapy (H)  F11.20             Treatment Plan:  -Continue lamotrigine 300 mg daily for mood stabilization  -Continue Prozac 40 mg daily for antianxiety and antidepressant treatment  -Continue Seroquel 300 mg nightly as needed for insomnia  -Resume buprenorphine transdermal patch once a week as currently prescribed for treatment of her opiate use disorder  -Referral for outpatient mental health treatment including medication management and psychotherapy  -Follow up with her primary care provider in 10 days  for suture removal  -Follow up with sleep medicine for ongoing management of her sleep disorder  -The patient is requesting to discharge home today.  There was no indication to utilize an involuntary hold or to pursue psychiatric hospitalization for stabilization.  We will proceed with transferring back home and transition her care back to the outpatient setting.    After the period in observation care, the patient's circumstances and mental state were safe for outpatient management. After counseling on the diagnosis, work-up, and treatment plan, the patient was discharged. Close follow-up with a psychiatrist and/or therapist was recommended and community psychiatric resources were provided. Patient is to return to the ED if any urgent or potentially life-threatening concerns.      At the time of discharge, the patient's acute suicide risk was determined to be low due to the following factors: Reduction in the intensity of mood/anxiety symptoms that preceded the admission, denial of suicidal thoughts, denies feeling helpless or hopeless, not currently under the influence of alcohol or illicit substances, denies experiencing command hallucinations, no immediate access to firearms. The patient's acute risk could be higher if noncompliant with their treatment plan, medications, follow-up appointments or using illicit substances or alcohol. Protective factors include: social supports, stable housing    --  Damir Mata MD   [facility] EMERGENCY DEPT  [facility] Unit        Damir Mata MD  03/07/25 2036

## 2025-03-07 NOTE — CONSULTS
"Diagnostic Evaluation Consultation  Crisis Assessment    Patient Name: Yara Edouard  Age:  44 year old  Legal Sex: female  Gender Identity: female  Pronouns:   Race: White  Ethnicity: Not  or   Language: English      Patient was assessed: In person   Crisis Assessment Start Date: 25  Crisis Assessment Start Time: 806  Crisis Assessment Stop Time: 846  Patient location: [facility] Emergency Dept                             [id number]    Referral Data and Chief Complaint  Yara Edouard presents to the ED via EMS. Patient is presenting to the ED for the following concerns: Worsening psychosocial stress, Other (see comment) (NSSIB). Factors that make the mental health crisis life threatening or complex are: Pt reports presenting to the ED via EMS after engaging in NSSIB while Pt was in a \"twilight state.\" Pt reports being diagnosed with REM disorder which distrubs her sleep and feels as though she cannot tell if what she is experiencing is a part of a dream or the real world. Pt reports she engaged in NSSIB via cutting due to wanting to confirm whether or not she was experiencing a dream or was awake. Pt reports once she started cutting, she continued to do so with a sense of relief and identified a history of engaging in NSSIB, yet hadn't for 15+ years. Pt identifies psychosocial stressors of long COVID, her father passing in a commercial airline accident a long time ago and is being triggered by recent aviation malfunctions, and the recent anniversary of her  arminda's birthday. When discussing additional mental health symptoms, Pt denies any SI, HI, AH/VH. Pt denies a history of intentional suicide attempts, yet reports she was a \"garbage can user\" regarding substances and would take as many as she could and would not consider whether the dose was lethal or not, which subsequently resulted in several IP admissions. Pt reports recent alcohol and marijuana use, yet denies " any concerns regarding the use at this time and reports using it when running out of medication. Pt reports she is not currently working with any mental health providers for her mental health, yet her PCP is prescribing her psychotropic medication. Pt also reports working with a team down at the [facility] for her sleep disorder. Pt voices desire to get established with therapy, psychiatry, and case management and is agreeable to meet with the attending psychiatric provider prior to requested discharge.      Informed Consent and Assessment Methods  Explained the crisis assessment process, including applicable information disclosures and limits to confidentiality, assessed understanding of the process, and obtained consent to proceed with the assessment.  Assessment methods included conducting a formal interview with patient, review of medical records, collaboration with medical staff, and obtaining relevant collateral information from family and community providers when available.  : done     History of the Crisis   Pt reports engaging in the NSSIB on 03/06/2025. Pt reports historical diagnoses of KAZ, MDD, PTSD, and Bipolar, yet voices hesitation regarding the bipolar diagnosis. Chart review indicates additional diagnoses of BPD and substance use disorder.    Brief Psychosocial History  Family:  Single, Children no  Support System:  Parent(s)  Employment Status:  unemployed  Source of Income:  other (see comments) (attempting to get established with disability)  Financial Environmental Concerns:  none  Current Hobbies:  interaction with pets, reading, television/movies/videos, puzzles  Barriers in Personal Life:  mental health concerns    Significant Clinical History  Current Anxiety Symptoms:     Current Depression/Trauma:  withdrawl/isolation  Current Somatic Symptoms:  somatic symptoms (abdominal pain, headache, tension) (Pt reports being in pain from the NSSIB)  Current Psychosis/Thought Disturbance:     Current  "Eating Symptoms:     Chemical Use History:  Alcohol: Other (comments) (Pt reports consuming alcohol when running out of certain medication such as Klonopin.)  Last Use:: 03/06/25  Benzodiazepines: None  Opiates: None  Cocaine: None  Marijuana: Occasional  Other Use: None   Past diagnosis:  Anxiety Disorder, Bipolar Disorder, Depression, Personality Disorder, PTSD, Substance Use Disorder  Family history:  Schizophrenia, Substance Use Disorder  Past treatment:  Psychiatric Medication Management, Inpatient Hospitalization, Individual therapy  Details of most recent treatment:  Pt reports working with PCP for medication management and sleep dr throught the [facility] in [place].  Other relevant history:  Pt reports history of working with a therapist, psychiatrist, and history of CD treatment and IP hospitalization.    Have there been any medication changes in the past two weeks:  no       Is the patient compliant with medications:  yes        Collateral Information  Is there collateral information: Yes     Collateral information name, relationship, phone number:  mother Mendoza, [phone]    What happened today: She shared her and Pt live together and she was out of the house visiting a friend when she received a call from Pt stating she had engaged in NSSIB while in a \"twilight state\" and did not have suicidal intent behind the action and is going to the ED via EMS     What is different about patient's functioning: She did not identify any specific noticable changes, she did share Pt has been working with her mental health and her substance use on and off for the past 20 years. She did identify Pt has not engaged in NSSIB for quite some time, 10+ years.     What do you think the patient needs:      Has patient made comments about wanting to kill themselves/others: no (She shared a history of Pt making these comments, yet nothing recent.)    If d/c is recommended, can they take part in safety/aftercare " planning:  yes    Additional collateral information:  She asked to be notified when she needs to come and pick Pt up when discharged.     Risk Assessment  [place] Suicide Severity Rating Scale Full Clinical Version:  Suicidal Ideation  3. Active Suicidal Ideation with any Methods (Not Plan) Without Intent to Act (Lifetime): Yes  4. Active Suicidal Ideation with Some Intent to Act, Without Specific Plan (Lifetime): No  5. Active Suicidal Ideation with Specific Plan and Intent (Lifetime): No  Q6 Suicide Behavior (Lifetime): yes     Suicidal Behavior (Lifetime)  Actual Attempt (Lifetime): Yes  Actual Attempt Description (Lifetime): Pt reports she has never actively attempted suicide, yet reports she has took too many medications without regard of whether she may die from the ingestion or not.  Has subject engaged in non-suicidal self-injurious behavior? (Lifetime): Yes    [place] Suicide Severity Rating Scale Recent:   Suicidal Ideation (Recent)  Q1 Wished to be Dead (Past Month): no  Q2 Suicidal Thoughts (Past Month): yes  Q3 Suicidal Thought Method: yes  Q4 Suicidal Intent without Specific Plan: yes  Q5 Suicide Intent with Specific Plan: no  If yes to Q6, within past 3 months?: no  Level of Risk per Screen: high risk  Intensity of Ideation (Recent)  Most Severe Ideation Rating (Past 1 Month):  (Pt denies any SI during the past month)  Description of Most Severe Ideation (Past 1 Month): Pt denies any recent suicidal ideation  Frequency (Past 1 Month):  (Does not apply, Pt denies any suicidal ideation during the assessment.)  Duration (Past 1 Month):  (Does not apply, Pt denies any suicidal ideation during the assessment.)  Controllability (Past 1 Month):  (Does not apply, Pt denies any suicidal ideation during the assessment.)  Deterrents (Past 1 Month): Deterrents definitely stopped you from attempting suicide  Reasons for Ideation (Past 1 Month): Does not apply  Suicidal Behavior (Recent)  Actual Attempt (Past 3  Months): No  Has subject engaged in non-suicidal self-injurious behavior? (Past 3 Months): Yes    Environmental or Psychosocial Events: impulsivity/recklessness, unemployment/underemployment, other life stressors, challenging interpersonal relationships  Protective Factors: Protective Factors: strong bond to family unit, community support, or employment, lives in a responsibly safe and stable environment, responsibilities and duties to others, including pets and children, help seeking, optimistic outlook - identification of future goals, sense of importance of health and wellness    Does the patient have thoughts of harming others? Feels Like Hurting Others: no  Previous Attempt to Hurt Others: no  Is the patient engaging in sexually inappropriate behavior?: no  Does Patient have a known history of aggressive behavior: No    Is the patient engaging in sexually inappropriate behavior?  no        Mental Status Exam   Affect: Appropriate  Appearance: Appropriate  Attention Span/Concentration: Attentive  Eye Contact: Engaged    Fund of Knowledge: Appropriate   Language /Speech Content: Fluent  Language /Speech Volume: Normal  Language /Speech Rate/Productions: Normal  Recent Memory: Intact  Remote Memory: Intact  Mood: Anxious, Normal  Orientation to Person: Yes   Orientation to Place: Yes  Orientation to Time of Day: Yes  Orientation to Date: Yes     Situation (Do they understand why they are here?): Yes  Psychomotor Behavior: Normal  Thought Content: Clear  Thought Form: Intact          Medication  Psychotropic medications:   Medication Orders - Psychiatric (From admission, onward)      Start     Dose/Rate Route Frequency Ordered Stop    03/07/25 2200  FLUoxetine (PROzac) capsule 40 mg         40 mg Oral AT BEDTIME 03/07/25 0242      03/07/25 2200  ramelteon (ROZEREM) tablet 8 mg         8 mg Oral AT BEDTIME 03/07/25 0308      03/07/25 0307  QUEtiapine (SEROquel) tablet 300 mg         300 mg Oral AT BEDTIME PRN  "03/07/25 0308      03/07/25 0236  OLANZapine zydis (zyPREXA) ODT tab 10 mg        Placed in \"Or\" Linked Group    10 mg Oral 2 TIMES DAILY PRN 03/07/25 0242      03/07/25 0236  OLANZapine (zyPREXA) injection 10 mg        Placed in \"Or\" Linked Group    10 mg Intramuscular 2 TIMES DAILY PRN 03/07/25 0242      03/07/25 0236  hydrOXYzine HCl (ATARAX) tablet 50 mg         50 mg Oral EVERY 6 HOURS PRN 03/07/25 0242               Current Care Team  Patient Care Team:  Neyda Meza MD as PCP - General (Family Practice)  Satya Mendes MD as MD (Psychiatry)  Conchis Santoro LP as Psychologist (Licensed Mental Health)    Diagnosis  Patient Active Problem List   Diagnosis Code    Drug overdose T50.901A    Chemical dependency (H) F19.20    Other bipolar disorder (H) F31.89    PTSD (post-traumatic stress disorder) F43.10    Borderline personality disorder (H) F60.3    Interstitial cystitis N30.10    Muscle pain M79.10    Gastritis K29.70    Suicidal ideation R45.851    Laceration of left forearm, initial encounter S51.812A    Laceration of left hand without foreign body, initial encounter S61.412A    Laceration of left upper arm, initial encounter S41.112A    At risk for intentional self-harm R45.89       Primary Problem This Admission  Active Hospital Problems    Suicidal ideation      Laceration of left forearm, initial encounter      Laceration of left hand without foreign body, initial encounter      Laceration of left upper arm, initial encounter      At risk for intentional self-harm        Clinical Summary and Substantiation of Recommendations   Clinical Substantiation:      At this time [facility] admission is not being recommended due to denial of active SI, HI, AH/VH. Pt was able to fully plan with writer. Pt's current presentation appears to be chronic in nature and Pt's current sx appear to be at Pt's baseline. Pt does appear to be at higher risk of death by suicide accidental or intentional due to " "mental health history and substance use history.  At this time IP MH admission does not appear to be the most therapeutically beneficial intervention/ level of care for Pt. Pt appears to be able to use and motivated to engage in supportive mental health/ community resources.     Pt voices desire to get established with therapy, psychiatry, and case management and is agreeable to meet with the attending psychiatric provider prior to requested discharge. Writer and Pt were able to get Pt scheduled with therapy and psychiatry following preferences identified. Referral to [facility] was completed.    Lethal Means Counseling   Has a specific means been identified for suicdal/homicidal actions? No - Pt engaged in NSSIB via cutting with a razor blade  Explain action steps toward mitigation: Discussing lethal means restriction and how to make environment safer.  Details of action steps completed: Discussed utilizing a lockbox to keep sharps in as creating an additional barrier if feeling impulsive or in a \"twilight state\" and Pt was agreeable.  Details of any follow up still needed: Provide Pt with lockbox prior to discharge.        Goals for crisis stabilization:  Meet with attending psychiatric provider    Next steps for Care Team:  Assist with scheduling therapy and psychiatry in the community and make referral for case management.    Treatment Objectives Addressed:  rapport building, identifying treatment goals, safety planning, Lethal means counseling, assessing safety, identifying an appropriate aftercare plan, identifying additional supports    Therapeutic Interventions:  Engaged in safety planning, Engaged in social skills training., Engaged in guided discovery, explored patient's perspectives and helped expand them through socratic dialogue.    Has a specific means been identified for suicidal/homicide actions: No    If yes, describe:       Explain action steps toward mitigation:       Document " completion of mitigation actions:       The follow up action still needed prior to discharge:       Patient coping skills attempted to reduce the crisis:  Voluntarily presented to the ED.    Disposition  Recommended referrals: Individual Therapy, Medication Management, Other. please comment (case management)        Reviewed case and recommendations with attending provider. Attending Name: Dr. Mata       Attending concurs with disposition: yes       Patient and/or validated legal guardian concurs with disposition:   yes       Final disposition:  discharge                            Legal status: Voluntary/Patient has signed consent for treatment                                                                                                                                 Reviewed court records: yes       Assessment Details   Total duration spent with the patient: 40 min     CPT code(s) utilized: 82088 - Psychotherapy for Crisis - 60 (30-74*) min    Ruben Fuentes [facility], Psychotherapist  DEC - Triage & Transition Services  Callback: [phone]

## 2025-03-07 NOTE — ED NOTES
44 year old female with history of REM sleep disorder, dissociative disorder, BPD, bipolar disorder, and substance abuse received from ED due to self-injurious behavior.     Pt stated that she cut herself because she was not able to tell if she was asleep or awake. Pt had 72 stiches placed on her left arm to close open wounds from her cuts. Pt's left arm is wrapped in bandage. Pt denied SI/HI. She endorsed anxiety 6-7/10 and depression 8/10. Pt endorsed having AH regularly or on a daily basis. She reports that they are not command in nature usually and they come and go.     Pt wears Buprenorphine patch on her chest which is replaced every 7 days. Next one is due on 3/11/25.     Nursing and risk assessments completed. Admission information reviewed with patient. Patient given a tour of [facility] and instructions on using the facility. Questions regarding [facility] addressed. Pt safety search completed.

## 2025-03-07 NOTE — ED NOTES
Bed: ED24  Expected date:   Expected time:   Means of arrival:   Comments:  NM42F multiple lacs, self harm

## 2025-03-07 NOTE — PROGRESS NOTES
Discharge instructions reviewed with patient including follow-up care plan. Educated on medication regimen and advised not to stop prescribed medication without consulting their physician. Reviewed safety plan and outpatient resources. Pt denies active SI. Pt has contracted for safety and completed safety plan with [facility].  All belongings which were brought into the hospital have been returned to patient. Escorted off the unit at 11:45 AM accompanied by MATT Richard.  Discharged to home in stable condition.

## 2025-03-07 NOTE — DISCHARGE INSTRUCTIONS
Scheduled Appointment(s):    Type: Teletherapy  Provider: Linda ZARCO  LICSW    Location: [facility]  [address], [address] (VIRTUAL APPT)   Phone: [phone]   Date/Time: 3/11/2025 1:00 pm     Type: Telepsychiatry  Provider: Sonia DOAN  CNP,RN    Location: [facility] [address], [facility] [address] (VIRTUAL APPT)   Phone: [phone]   Date/Time: 3/12/2025 2:00 pm   Instructions: Clients will be contacted by our scheduling team to complete paperwork, obtain & check insurance, and provide them with the link that we will meet at virtually. Paperwork MUST be completed 24hrs prior to appointment. Appointment is not confirmed until requested paperwork is completed and returned. Clients MUST complete the required paperwork prior to being seen.

## 2025-03-07 NOTE — PROGRESS NOTES
2033: Writer attempted to meet with patient for DEC assessment. Pt was in with staff at the time and per EMT was needing PRN for anxiety at that time. Writer messaged RN and asked for an alert when patient is ready to be seen.     2212: Writer connected with RN who stated pt will be getting sutures and could take up to 1 hour until pt is done and ready for DEC.    Jean Osuna, CISCO on 3/6/2025 at 8:57 PM

## 2025-03-07 NOTE — PSYCH
Writer received call from unit nurse regarding [facility] admission orders [facility]. Patient reported to be medically cleared. [facility] admission orders placed and applicable home medications ordered (that were able to confirmed by medical records).

## 2025-05-12 ENCOUNTER — LAB REQUISITION (OUTPATIENT)
Dept: LAB | Facility: CLINIC | Age: 45
End: 2025-05-12
Payer: COMMERCIAL

## 2025-05-12 DIAGNOSIS — R22.32 LOCALIZED SWELLING, MASS AND LUMP, LEFT UPPER LIMB: ICD-10-CM

## 2025-05-12 PROCEDURE — 88304 TISSUE EXAM BY PATHOLOGIST: CPT | Mod: 26 | Performed by: PATHOLOGY

## 2025-05-12 PROCEDURE — 88304 TISSUE EXAM BY PATHOLOGIST: CPT | Mod: TC,ORL | Performed by: ORTHOPAEDIC SURGERY

## 2025-05-13 LAB
PATH REPORT.COMMENTS IMP SPEC: NORMAL
PATH REPORT.FINAL DX SPEC: NORMAL
PATH REPORT.GROSS SPEC: NORMAL
PATH REPORT.MICROSCOPIC SPEC OTHER STN: NORMAL
PATH REPORT.RELEVANT HX SPEC: NORMAL
PHOTO IMAGE: NORMAL

## 2025-08-03 ENCOUNTER — NURSE TRIAGE (OUTPATIENT)
Dept: NURSING | Facility: CLINIC | Age: 45
End: 2025-08-03
Payer: COMMERCIAL